# Patient Record
Sex: FEMALE | Race: WHITE | NOT HISPANIC OR LATINO | Employment: FULL TIME | URBAN - METROPOLITAN AREA
[De-identification: names, ages, dates, MRNs, and addresses within clinical notes are randomized per-mention and may not be internally consistent; named-entity substitution may affect disease eponyms.]

---

## 2017-01-31 ENCOUNTER — OFFICE VISIT (OUTPATIENT)
Dept: VASCULAR LAB | Facility: MEDICAL CENTER | Age: 27
End: 2017-01-31
Attending: NURSE PRACTITIONER
Payer: MEDICAID

## 2017-01-31 VITALS
HEART RATE: 85 BPM | HEIGHT: 67 IN | DIASTOLIC BLOOD PRESSURE: 90 MMHG | SYSTOLIC BLOOD PRESSURE: 128 MMHG | WEIGHT: 115 LBS | BODY MASS INDEX: 18.05 KG/M2

## 2017-01-31 DIAGNOSIS — I82.4Y9 ACUTE VENOUS EMBOLISM AND THROMBOSIS OF DEEP VESSELS OF PROXIMAL LOWER EXTREMITY, UNSPECIFIED LATERALITY (HCC): ICD-10-CM

## 2017-01-31 PROCEDURE — 99214 OFFICE O/P EST MOD 30 MIN: CPT | Performed by: NURSE PRACTITIONER

## 2017-01-31 PROCEDURE — 99213 OFFICE O/P EST LOW 20 MIN: CPT | Performed by: NURSE PRACTITIONER

## 2017-01-31 ASSESSMENT — ENCOUNTER SYMPTOMS
BLOOD IN STOOL: 0
COUGH: 0
SHORTNESS OF BREATH: 0
BRUISES/BLEEDS EASILY: 0
TINGLING: 0
HEMOPTYSIS: 0
DIZZINESS: 0
HEADACHES: 0
LOSS OF CONSCIOUSNESS: 0
NERVOUS/ANXIOUS: 0
PALPITATIONS: 0
WHEEZING: 0

## 2017-01-31 NOTE — MR AVS SNAPSHOT
"        Nichol Chaney   2017 4:00 PM   Office Visit   MRN: [id number]    Department:  Vascular Medicine   Dept Phone:  [phone]    Description:  Female : 1990   Provider:  OMAR Galvan           Reason for Visit     New Patient LOT      Allergies as of 2017     No Known Allergies      You were diagnosed with     Acute venous embolism and thrombosis of deep vessels of proximal lower extremity, unspecified laterality (CMS-HCC)   [[id number]]         Vital Signs     Blood Pressure Pulse Height Weight Body Mass Index Smoking Status    128/90 mmHg 85 1.702 m (5' 7.01\") 52.164 kg (115 lb) 18.01 kg/m2 Current Some Day Smoker      Basic Information     Date Of Birth Sex Race Ethnicity Preferred Language    1990 Female White Non- English      Problem List              ICD-10-CM Priority Class Noted - Resolved    Acute venous embolism and thrombosis of deep vessels of proximal lower extremity (HCC) [I82.4Y9] I82.4Y9   10/18/2016 - Present      Health Maintenance        Date Due Completion Dates    IMM HEP B VACCINE (1 of 3 - Primary Series) 1991 ---    IMM HEP A VACCINE (1 of 2 - Standard Series) 1992 ---    IMM HPV VACCINE (1 of 3 - Female 3 Dose Series) 2002 ---    IMM VARICELLA (CHICKENPOX) VACCINE (1 of 2 - 2 Dose Adolescent Series) 2004 ---    PAP SMEAR 2012 ---    IMM INFLUENZA (1) 2016 ---    IMM DTaP/Tdap/Td Vaccine (2 - Td) 2023, 2011            Current Immunizations     Tdap Vaccine 2013  5:30 PM, 2011  5:15 PM      Below and/or attached are the medications your provider expects you to take. Review all of your home medications and newly ordered medications with your provider and/or pharmacist. Follow medication instructions as directed by your provider and/or pharmacist. Please keep your medication list with you and share with your provider. Update the information when medications are discontinued, doses are " changed, or new medications (including over-the-counter products) are added; and carry medication information at all times in the event of emergency situations     Allergies:  No Known Allergies          Medications  Valid as of: January 31, 2017 -  5:32 PM    Generic Name Brand Name Tablet Size Instructions for use    Apixaban (Tab) ELIQUIS 5mg Take 5 mg by mouth 2 Times a Day.        .                 Medicines prescribed today were sent to:     [facility] [address] [place] [address], [place] - [address] [place] [address]    Phone: [phone] Fax: [phone]    Open 24 Hours?: No      Medication refill instructions:       If your prescription bottle indicates you have medication refills left, it is not necessary to call your provider’s office. Please contact your pharmacy and they will refill your medication.    If your prescription bottle indicates you do not have any refills left, you may request refills at any time through one of the following ways: The online [facility]undi system (except Urgent Care), by calling your provider’s office, or by asking your pharmacy to contact your provider’s office with a refill request. Medication refills are processed only during regular business hours and may not be available until the next business day. Your provider may request additional information or to have a follow-up visit with you prior to refilling your medication.   *Please Note: Medication refills are assigned a new Rx number when refilled electronically. Your pharmacy may indicate that no refills were authorized even though a new prescription for the same medication is available at the pharmacy. Please request the medicine by name with the pharmacy before contacting your provider for a refill.        Your To Do List     Future Labs/Procedures Complete By Expires    BETA-2-MICROGLOBULIN  As directed 1/31/2018    D-DIMER  As directed 1/31/2018         Mundi Status: Patient Declined

## 2017-02-01 NOTE — PROGRESS NOTES
"INITIAL VASCULAR ANTI-COAGULATION VISIT  Subjective:   Nichol Chaney is a 26 y.o. female who presents today 1/31/2017 for   Chief Complaint   Patient presents with   • New Patient     LOT       HPI: Pt presents today for evaluation of her chronic anticoagulation and determination of end of therapy.    Had 1st time, seemingly unprovoked DVT in October of 2016. Denies any previous VTE.  No known family hx of VTE.  Denies any recent surgery or trauma, prior to the event.  Pt is an active smoker, smoking ~2-5 cigarettes/day.  Hx of OCT 5 years ago; Depo Provera  Denies any previous extended travel.  She states she is up to date on all age-appropriate cancer screenings  Had some hypercoag w/u during initial presentation of DVT; protein C low, but all other tests normal (factor V leiden, lupus anticoag, & protein S)  She is wearing compression stockings daily    Social History   Substance Use Topics   • Smoking status: Current Some Day Smoker -- 0.00 packs/day for 1 years     Start date: 10/01/2016   • Smokeless tobacco: Never Used      Comment: 1/2 ppd   • Alcohol Use: No     DIET AND EXERCISE:  Weight Change: stable  Diet: common adult  Exercise: walks daily; works as      Review of Systems   Constitutional: Negative for malaise/fatigue.   HENT: Negative for nosebleeds.    Respiratory: Negative for cough, hemoptysis, shortness of breath and wheezing.    Cardiovascular: Negative for chest pain, palpitations and leg swelling.   Gastrointestinal: Negative for blood in stool.   Genitourinary: Negative for hematuria.   Skin: Negative for itching and rash.   Neurological: Negative for dizziness, tingling, loss of consciousness and headaches.   Endo/Heme/Allergies: Does not bruise/bleed easily.   Psychiatric/Behavioral: The patient is not nervous/anxious.       Objective:     Filed Vitals:    01/31/17 1550   BP: 128/90   Pulse: 85   Height: 1.702 m (5' 7.01\")   Weight: 52.164 kg (115 lb)     Body mass index " is 18.01 kg/(m^2).  Physical Exam   Constitutional: She is oriented to person, place, and time. She appears well-developed and well-nourished. No distress.   HENT:   Head: Normocephalic.   Eyes: Pupils are equal, round, and reactive to light.   Neck: Normal range of motion.   Cardiovascular: Normal rate, regular rhythm, normal heart sounds and intact distal pulses.    No murmur heard.  Pulmonary/Chest: Effort normal and breath sounds normal. No respiratory distress. She has no wheezes.   Musculoskeletal: Normal range of motion. She exhibits no edema.   Neurological: She is alert and oriented to person, place, and time. Coordination normal.   Skin: Skin is warm and dry. She is not diaphoretic. No erythema.   Psychiatric: She has a normal mood and affect. Her behavior is normal. Thought content normal.         Lab Results   Component Value Date    PROTHROMBTM 14.7* 10/13/2016    INR 1.5 10/21/2016         Lab Results   Component Value Date    SODIUM 136 10/13/2016    POTASSIUM 4.2 10/13/2016    CHLORIDE 103 10/13/2016    CO2 21 10/13/2016    GLUCOSE 77 10/13/2016    BUN 7* 10/13/2016    CREATININE 0.76 10/13/2016        Lab Results   Component Value Date    WBC 6.8 10/13/2016    RBC 5.39 10/13/2016    HEMOGLOBIN 18.0* 10/13/2016    HEMATOCRIT 52.1* 10/13/2016    MCV 96.7 10/13/2016    MCH 33.4* 10/13/2016    MCHC 34.5 10/13/2016    MPV 10.5 10/13/2016      Medical Decision Making:  Today's Assessment / Status / Plan:     1. Acute venous embolism and thrombosis of deep vessels of proximal lower extremity, unspecified laterality (CMS-HCC)  D-DIMER    BETA-2-MICROGLOBULIN    ANTICARDIOLIPIN AB IGG IGM    PROTEIN C FUNCTIONAL    PROTEIN S FUNCTIONAL    ANTITHROMBIN III FUNC/IMMUNOL    [facility] PROTHROMBIN GENE ANALYSIS     Indication for anticoagulation:  LLE DVT, femoral/profunda vein 10/13/16    Anti-Platelet/Anti-Coagulant Tx: yes, Eliquis 5mg BID    Anti-Coagulation Plan:  Pt has completed 3 full mo of anticoagulation  "with Eliquis.  Was initially on Xarelto, but had some unusual bruising.  Switched to Eliquis in the beginning of her treatment and tolerated without complaint or concern for excessive bleeding/bruising.  Discussed the potential for increased VTE recurrence with continued smoking.  Pt understands this potential, and plans to \"cut down,\" on her smoking.  Discussed e-cig/nicotine replacement, however, she is not interested in smoking cessation at this time.  Given this risk, she still wishes to stop anticoagulation.  D/t the unprovoked nature of her VTE and previous low protein C value, will do hypercoag panel.  Discussed stopping Eliquis and starting a daily, lifelong, aspirin regimen.  Reviewed s/s of return VTE and when to seek immediate care.  Pt understands that if she plans on having any invasive procedures, she needs to let the MD know that she is taking ASA daily.  Pt understands to avoid HRT/OCT lifelong, and to notify her OB of her DVT hx, with any future pregnancies.  Pt states she is planning on having more children in her future.  -Take last dose of Eliquis tonight, then STOP  -Tomorrow, start ASA 81mg daily  -In a full 2weeks, have hypercoag panel drawn  -Continue to monitor for s/s of return VTE and seek immediate care for chest pain, SOB, slurring of words or confusion  -Continue to wear compression stockings for at least 1 year    Smoking: strongly recommend smoking cessation.  Pt understands the increased risk of return VTE with continued smoking.       Physical Activity: frequency : goal is  3-4 times a week    Instructed to follow-up with PCP for remainder of adult medical needs: yes- encouraged to establish with a PCP  We will partner with other provider in the management of established vascular disease and cardiometabolic risk factors    Studies to Be Obtained:   Labs to Be Obtained: d dimer, anticardiolipin antibodies, beta 2 microglob, protein C/S, antithrombin III, prothrombin gene " analysis    Follow up in: 3 weeks via telephone to review hypercoag panel    APRYL Galvan.

## 2017-02-07 ENCOUNTER — HOSPITAL ENCOUNTER (EMERGENCY)
Facility: MEDICAL CENTER | Age: 27
End: 2017-02-07
Attending: EMERGENCY MEDICINE
Payer: MEDICAID

## 2017-02-07 VITALS
BODY MASS INDEX: 17.89 KG/M2 | RESPIRATION RATE: 16 BRPM | DIASTOLIC BLOOD PRESSURE: 85 MMHG | SYSTOLIC BLOOD PRESSURE: 125 MMHG | OXYGEN SATURATION: 100 % | HEART RATE: 84 BPM | TEMPERATURE: 98.3 F | HEIGHT: 67 IN | WEIGHT: 113.98 LBS

## 2017-02-07 DIAGNOSIS — S41.112A LACERATION OF ARM, LEFT, INITIAL ENCOUNTER: ICD-10-CM

## 2017-02-07 PROCEDURE — 99283 EMERGENCY DEPT VISIT LOW MDM: CPT

## 2017-02-07 PROCEDURE — 304217 HCHG IRRIGATION SYSTEM

## 2017-02-07 PROCEDURE — A6403 STERILE GAUZE>16 <= 48 SQ IN: HCPCS

## 2017-02-07 PROCEDURE — 302874 HCHG BANDAGE ACE 2 OR 3""

## 2017-02-07 RX ORDER — ASPIRIN 81 MG/1
81 TABLET, CHEWABLE ORAL DAILY
Status: SHIPPED | COMMUNITY
End: 2019-01-02

## 2017-02-07 ASSESSMENT — PAIN SCALES - GENERAL: PAINLEVEL_OUTOF10: 9

## 2017-02-07 NOTE — DISCHARGE INSTRUCTIONS
Laceration Care, Adult  A laceration is a cut that goes through all layers of the skin. The cut also goes into the tissue that is right under the skin. Some cuts heal on their own. Others need to be closed with stitches (sutures), staples, skin adhesive strips, or wound glue. Taking care of your cut lowers your risk of infection and helps your cut to heal better.  HOW TO TAKE CARE OF YOUR CUT  For stitches or staples:  · Keep the wound clean and dry.  · If you were given a bandage (dressing), you should change it at least one time per day or as told by your doctor. You should also change it if it gets wet or dirty.  · Keep the wound completely dry for the first 24 hours or as told by your doctor. After that time, you may take a shower or a bath. However, make sure that the wound is not soaked in water until after the stitches or staples have been removed.  · Clean the wound one time each day or as told by your doctor:  ¨ Wash the wound with soap and water.  ¨ Rinse the wound with water until all of the soap comes off.  ¨ Pat the wound dry with a clean towel. Do not rub the wound.  · After you clean the wound, put a thin layer of antibiotic ointment on it as told by your doctor. This ointment:  ¨ Helps to prevent infection.  ¨ Keeps the bandage from sticking to the wound.  · Have your stitches or staples removed as told by your doctor.  If your doctor used skin adhesive strips:   · Keep the wound clean and dry.  · If you were given a bandage, you should change it at least one time per day or as told by your doctor. You should also change it if it gets dirty or wet.  · Do not get the skin adhesive strips wet. You can take a shower or a bath, but be careful to keep the wound dry.  · If the wound gets wet, pat it dry with a clean towel. Do not rub the wound.  · Skin adhesive strips fall off on their own. You can trim the strips as the wound heals. Do not remove any strips that are still stuck to the wound. They will  fall off after a while.  If your doctor used wound glue:  · Try to keep your wound dry, but you may briefly wet it in the shower or bath. Do not soak the wound in water, such as by swimming.  · After you take a shower or a bath, gently pat the wound dry with a clean towel. Do not rub the wound.  · Do not do any activities that will make you really sweaty until the skin glue has fallen off on its own.  · Do not apply liquid, cream, or ointment medicine to your wound while the skin glue is still on.  · If you were given a bandage, you should change it at least one time per day or as told by your doctor. You should also change it if it gets dirty or wet.  · If a bandage is placed over the wound, do not let the tape for the bandage touch the skin glue.  · Do not pick at the glue. The skin glue usually stays on for 5-10 days. Then, it falls off of the skin.  General Instructions   · To help prevent scarring, make sure to cover your wound with sunscreen whenever you are outside after stitches are removed, after adhesive strips are removed, or when wound glue stays in place and the wound is healed. Make sure to wear a sunscreen of at least 30 SPF.  · Take over-the-counter and prescription medicines only as told by your doctor.  · If you were given antibiotic medicine or ointment, take or apply it as told by your doctor. Do not stop using the antibiotic even if your wound is getting better.  · Do not scratch or pick at the wound.  · Keep all follow-up visits as told by your doctor. This is important.  · Check your wound every day for signs of infection. Watch for:  ¨ Redness, swelling, or pain.  ¨ Fluid, blood, or pus.  · Raise (elevate) the injured area above the level of your heart while you are sitting or lying down, if possible.  GET HELP IF:  · You got a tetanus shot and you have any of these problems at the injection site:  ¨ Swelling.  ¨ Very bad pain.  ¨ Redness.  ¨ Bleeding.  · You have a fever.  · A wound that was  closed breaks open.  · You notice a bad smell coming from your wound or your bandage.  · You notice something coming out of the wound, such as wood or glass.  · Medicine does not help your pain.  · You have more redness, swelling, or pain at the site of your wound.  · You have fluid, blood, or pus coming from your wound.  · You notice a change in the color of your skin near your wound.  · You need to change the bandage often because fluid, blood, or pus is coming from the wound.  · You start to have a new rash.  · You start to have numbness around the wound.  GET HELP RIGHT AWAY IF:  · You have very bad swelling around the wound.  · Your pain suddenly gets worse and is very bad.  · You notice painful lumps near the wound or on skin that is anywhere on your body.  · You have a red streak going away from your wound.  · The wound is on your hand or foot and you cannot move a finger or toe like you usually can.  · The wound is on your hand or foot and you notice that your fingers or toes look pale or bluish.     This information is not intended to replace advice given to you by your health care provider. Make sure you discuss any questions you have with your health care provider.     Document Released: 06/05/2009 Document Revised: 05/03/2016 Document Reviewed: 12/14/2015  [employer] Interactive Patient Education ©2016 [employer]

## 2017-02-07 NOTE — ED AVS SNAPSHOT
Adenios Access Code: Activation code not generated  Current Adenios Status: Patient Declined    Your email address is not on file at [facility].  Email Addresses are required for you to sign up for Adenios, please contact [phone] to verify your personal information and to provide your email address prior to attempting to register for Adenios.    Nichol Chaney  [address]  [place] [address]    [employer]  A secure, online tool to manage your health information     [facility]’s Adenios® is a secure, online tool that connects you to your personalized health information from the privacy of your home -- day or night - making it very easy for you to manage your healthcare. Once the activation process is completed, you can even access your medical information using the Adenios livier, which is available for free in the Apple Livier store or Google Play store.     To learn more about Adenios, visit [email or web address]    There are two levels of access available (as shown below):   My Chart Features  [facility] Primary Care Doctor [facility]  Specialists [facility]  Urgent  Care Non-[facility] Primary Care Doctor   Email your healthcare team securely and privately 24/7 X X X    Manage appointments: schedule your next appointment; view details of past/upcoming appointments X      Request prescription refills. X      View recent personal medical records, including lab and immunizations X X X X   View health record, including health history, allergies, medications X X X X   Read reports about your outpatient visits, procedures, consult and ER notes X X X X   See your discharge summary, which is a recap of your hospital and/or ER visit that includes your diagnosis, lab results, and care plan X X  X     How to register for [employer]:  Once your e-mail address has been verified, follow the following steps to sign up for [employer].     1. Go to  [email or web address]  2. Click on the Sign Up Now box, which takes you to the New  Member Sign Up page. You will need to provide the following information:  a. Enter your [facility] Access Code exactly as it appears at the top of this page. (You will not need to use this code after you’ve completed the sign-up process. If you do not sign up before the expiration date, you must request a new code.)   b. Enter your date of birth.   c. Enter your home email address.   d. Click Submit, and follow the next screen’s instructions.  3. Create a [employer]t ID. This will be your [facility]ota login ID and cannot be changed, so think of one that is secure and easy to remember.  4. Create a [facility] password. You can change your password at any time.  5. Enter your Password Reset Question and Answer. This can be used at a later time if you forget your password.   6. Enter your e-mail address. This allows you to receive e-mail notifications when new information is available in Ankota.  7. Click Sign Up. You can now view your health information.    For assistance activating your [facility] account, call [phone]

## 2017-02-07 NOTE — ED AVS SNAPSHOT
Home Care Instructions                                                                                                                Nichol Chaney   MRN: [id number]    Department:  [facility], Emergency Dept   Date of Visit:  2/7/2017            [facility], Emergency Dept    [address] [place] [address]    Phone:  [phone]      You were seen by     Carlos Gaston M.D.      Your Diagnosis Was     Laceration of arm, left, initial encounter     S41.112A       Follow-up Information     1. Schedule an appointment as soon as possible for a visit with Pcp Pt States None.    Specialty:  Family Medicine    Why:  As needed, Return if any symptoms worsen      Medication Information     Review all of your home medications and newly ordered medications with your primary doctor and/or pharmacist as soon as possible. Follow medication instructions as directed by your doctor and/or pharmacist.     Please keep your complete medication list with you and share with your physician. Update the information when medications are discontinued, doses are changed, or new medications (including over-the-counter products) are added; and carry medication information at all times in the event of emergency situations.               Medication List      ASK your doctor about these medications        Instructions    aspirin 81 MG Chew chewable tablet   Commonly known as:  ASA    Take 81 mg by mouth every day.   Dose:  81 mg       ELIQUIS 5mg Tabs   Generic drug:  apixaban    Take 5 mg by mouth 2 Times a Day.   Dose:  5 mg               Procedures and tests performed during your visit     NURSING COMMUNICATION        Discharge Instructions       Laceration Care, Adult  A laceration is a cut that goes through all layers of the skin. The cut also goes into the tissue that is right under the skin. Some cuts heal on their own. Others need to be closed with stitches (sutures),  staples, skin adhesive strips, or wound glue. Taking care of your cut lowers your risk of infection and helps your cut to heal better.  HOW TO TAKE CARE OF YOUR CUT  For stitches or staples:  · Keep the wound clean and dry.  · If you were given a bandage (dressing), you should change it at least one time per day or as told by your doctor. You should also change it if it gets wet or dirty.  · Keep the wound completely dry for the first 24 hours or as told by your doctor. After that time, you may take a shower or a bath. However, make sure that the wound is not soaked in water until after the stitches or staples have been removed.  · Clean the wound one time each day or as told by your doctor:  ¨ Wash the wound with soap and water.  ¨ Rinse the wound with water until all of the soap comes off.  ¨ Pat the wound dry with a clean towel. Do not rub the wound.  · After you clean the wound, put a thin layer of antibiotic ointment on it as told by your doctor. This ointment:  ¨ Helps to prevent infection.  ¨ Keeps the bandage from sticking to the wound.  · Have your stitches or staples removed as told by your doctor.  If your doctor used skin adhesive strips:   · Keep the wound clean and dry.  · If you were given a bandage, you should change it at least one time per day or as told by your doctor. You should also change it if it gets dirty or wet.  · Do not get the skin adhesive strips wet. You can take a shower or a bath, but be careful to keep the wound dry.  · If the wound gets wet, pat it dry with a clean towel. Do not rub the wound.  · Skin adhesive strips fall off on their own. You can trim the strips as the wound heals. Do not remove any strips that are still stuck to the wound. They will fall off after a while.  If your doctor used wound glue:  · Try to keep your wound dry, but you may briefly wet it in the shower or bath. Do not soak the wound in water, such as by swimming.  · After you take a shower or a bath,  gently pat the wound dry with a clean towel. Do not rub the wound.  · Do not do any activities that will make you really sweaty until the skin glue has fallen off on its own.  · Do not apply liquid, cream, or ointment medicine to your wound while the skin glue is still on.  · If you were given a bandage, you should change it at least one time per day or as told by your doctor. You should also change it if it gets dirty or wet.  · If a bandage is placed over the wound, do not let the tape for the bandage touch the skin glue.  · Do not pick at the glue. The skin glue usually stays on for 5-10 days. Then, it falls off of the skin.  General Instructions   · To help prevent scarring, make sure to cover your wound with sunscreen whenever you are outside after stitches are removed, after adhesive strips are removed, or when wound glue stays in place and the wound is healed. Make sure to wear a sunscreen of at least 30 SPF.  · Take over-the-counter and prescription medicines only as told by your doctor.  · If you were given antibiotic medicine or ointment, take or apply it as told by your doctor. Do not stop using the antibiotic even if your wound is getting better.  · Do not scratch or pick at the wound.  · Keep all follow-up visits as told by your doctor. This is important.  · Check your wound every day for signs of infection. Watch for:  ¨ Redness, swelling, or pain.  ¨ Fluid, blood, or pus.  · Raise (elevate) the injured area above the level of your heart while you are sitting or lying down, if possible.  GET HELP IF:  · You got a tetanus shot and you have any of these problems at the injection site:  ¨ Swelling.  ¨ Very bad pain.  ¨ Redness.  ¨ Bleeding.  · You have a fever.  · A wound that was closed breaks open.  · You notice a bad smell coming from your wound or your bandage.  · You notice something coming out of the wound, such as wood or glass.  · Medicine does not help your pain.  · You have more redness,  swelling, or pain at the site of your wound.  · You have fluid, blood, or pus coming from your wound.  · You notice a change in the color of your skin near your wound.  · You need to change the bandage often because fluid, blood, or pus is coming from the wound.  · You start to have a new rash.  · You start to have numbness around the wound.  GET HELP RIGHT AWAY IF:  · You have very bad swelling around the wound.  · Your pain suddenly gets worse and is very bad.  · You notice painful lumps near the wound or on skin that is anywhere on your body.  · You have a red streak going away from your wound.  · The wound is on your hand or foot and you cannot move a finger or toe like you usually can.  · The wound is on your hand or foot and you notice that your fingers or toes look pale or bluish.     This information is not intended to replace advice given to you by your health care provider. Make sure you discuss any questions you have with your health care provider.     Document Released: 06/05/2009 Document Revised: 05/03/2016 Document Reviewed: 12/14/2015  [employer] Interactive Patient Education ©2016 [employer]            Patient Information     Patient Information    Following emergency treatment: all patient requiring follow-up care must return either to a private physician or a clinic if your condition worsens before you are able to obtain further medical attention, please return to the emergency room.     Billing Information    At [facility], we work to make the billing process streamlined for our patients.  Our Representatives are here to answer any questions you may have regarding your hospital bill.  If you have insurance coverage and have supplied your insurance information to us, we will submit a claim to your insurer on your behalf.  Should you have any questions regarding your bill, we can be reached online or by phone as follows:  Online: You are able pay your bills online or live chat with our  representatives about any billing questions you may have. We are here to help Monday - Friday from 8:00am to 7:30pm and 9:00am - 12:00pm on Saturdays.  Please visit [email or web address]  for more information.   Phone:  [phone] or [phone]    Please note that your emergency physician, surgeon, pathologist, radiologist, anesthesiologist, and other specialists are not employed by [facility] and will therefore bill separately for their services.  Please contact them directly for any questions concerning their bills at the numbers below:     Emergency Physician Services:  [phone]  [facility]:  [phone]  [facility]:  [phone]  [facility]:  [phone]    1. Your final bill may vary from the amount quoted upon discharge if all procedures are not complete at that time, or if your doctor has additional procedures of which we are not aware. You will receive an additional bill if you return to the Emergency Department at [facility] for suture removal regardless of the facility of which the sutures were placed.     2. Please arrange for settlement of this account at the emergency registration.    3. All self-pay accounts are due in full at the time of treatment.  If you are unable to meet this obligation then payment is expected within 4-5 days.     4. If you have had radiology studies (CT, X-ray, Ultrasound, MRI), you have received a preliminary result during your emergency department visit. Please contact the radiology department [phone] to receive a copy of your final result. Please discuss the Final result with your primary physician or with the follow up physician provided.     Crisis Hotline:  [place] Crisis Hotline:  4-331-WSEFQJM or 1-873.744.1149  [place] Crisis Hotline:    1-445.696.3504 or 022-804-6354         ED Discharge Follow Up Questions    1. In order to provide you with very good care, we would  like to follow up with a phone call in the next few days.  May we have your permission to contact you?     YES /  NO    2. What is the best phone number to call you? (       )_____-__________    3. What is the best time to call you?      Morning  /  Afternoon  /  Evening                   Patient Signature:  ____________________________________________________________    Date:  ____________________________________________________________

## 2017-02-07 NOTE — ED PROVIDER NOTES
ED Provider Note    CHIEF COMPLAINT  Chief Complaint   Patient presents with   • T-5000 Lacerations     LAC Last night from broken plate        HPI  Nichol Chaney is a 26 y.o. female who presents to ED with complaint of laceration to the left arm. Patient's dates that she was cleaning dishes reaching into her  surgically cut in the antecubital fossa area of her left arm. This occurred about 9 o'clock last night. Patient thought that it would close up on its own, but because it was still rather wide. This morning she decided to come to the ED for evaluation. Now been greater than 12 hours of this wound being opened. She currently is on a liquid because of a DVT. Patient denies any other symptoms.        REVIEW OF SYSTEMS  See HPI for further details. All other systems are negative.     PAST MEDICAL HISTORY  Past Medical History   Diagnosis Date   • DVT (deep venous thrombosis) (CMS-[facility])        FAMILY HISTORY  History reviewed. No pertinent family history.  Patient's family history has been discussed and is been found to be noncontributory to his present illness  SOCIAL HISTORY  Social History     Social History   • Marital Status: Single     Spouse Name: N/A   • Number of Children: N/A   • Years of Education: N/A     Social History Main Topics   • Smoking status: Current Every Day Smoker -- 0.25 packs/day for 1 years     Start date: 10/01/2016   • Smokeless tobacco: Never Used      Comment: 1/2 ppd   • Alcohol Use: Yes      Comment: Occas   • Drug Use: No   • Sexual Activity: Not Asked     Other Topics Concern   • None     Social History Narrative            SURGICAL HISTORY  History reviewed. No pertinent past surgical history.    CURRENT MEDICATIONS   Home Medications     Reviewed by Itzel Pena R.N. (Registered Nurse) on 02/07/17 at 0925  Med List Status: Complete    Medication Last Dose Status    apixaban (ELIQUIS) 5mg Tab 1/31/2017 Active    aspirin (ASA) 81 MG Chew Tab chewable tablet 2/6/2017  "Active                ALLERGIES   No Known Allergies    PHYSICAL EXAM  VITAL SIGNS: /90 mmHg  Pulse 99  Temp(Src) 36.8 °C (98.3 °F)  Resp 14  Ht 1.702 m (5' 7\")  Wt 51.7 kg (113 lb 15.7 oz)  BMI 17.85 kg/m2  SpO2 99%   Pulse Ox interpretation. Nonhypoxic    Constitutional: Well developed, Well nourished, No acute distress, Non-toxic appearance.     Skin: Warm, Dry, No erythema,   Musculoskeletal: Patient has a 4.5 cm laceration located in the antecubital fossa that just extends down to the subcutaneous tissue, but no further. It is open. There is no vascular involvement. There is no tendinous involvement.  Neurologic: Alert & oriented x 3, Normal motor function, Normal sensory function, No focal deficits noted.     RADIOLOGY/PROCEDURES  No orders to display         COURSE & MEDICAL DECISION MAKING  Pertinent Labs & Imaging studies reviewed. (See chart for details)  The wound is been open greater than 12 hours. At this point, I feel by suture. A para would actually cause infection or the infection risk is significantly worse. At this point, I do feel that secondary intention. It is worthwhile for healing. I have given the patient would care instructions. Recommend return as needed. The wound was cleaned out and bacitracin was placed on the wound.    FINAL IMPRESSION  1. Laceration of arm, left, initial encounter           The patient will return for new or worsening symptoms and is stable at the time of discharge.    The patient is referred to a primary physician for blood pressure management, diabetic screening, and for all other preventative health concerns.    DISPOSITION:  Patient will be discharged home in stable condition.    FOLLOW UP:  Pcp Pt States None    Schedule an appointment as soon as possible for a visit  As needed, Return if any symptoms worsen      OUTPATIENT MEDICATIONS:  New Prescriptions    No medications on file                 Electronically signed by: Carlos Gaston, 2/7/2017 " 9:42 AM

## 2017-02-16 ENCOUNTER — ANTICOAGULATION MONITORING (OUTPATIENT)
Dept: VASCULAR LAB | Facility: MEDICAL CENTER | Age: 27
End: 2017-02-16

## 2017-02-16 DIAGNOSIS — I82.4Y9 ACUTE VENOUS EMBOLISM AND THROMBOSIS OF DEEP VESSELS OF PROXIMAL LOWER EXTREMITY, UNSPECIFIED LATERALITY (HCC): ICD-10-CM

## 2017-02-16 NOTE — PROGRESS NOTES
Discharged from [facility].  Therapy completed 1/31/2017 by Dr. Adriana Smith, PHARMD

## 2017-02-27 ENCOUNTER — TELEPHONE (OUTPATIENT)
Dept: VASCULAR LAB | Facility: MEDICAL CENTER | Age: 27
End: 2017-02-27

## 2017-02-28 ENCOUNTER — TELEPHONE (OUTPATIENT)
Dept: VASCULAR LAB | Facility: MEDICAL CENTER | Age: 27
End: 2017-02-28

## 2017-03-01 NOTE — TELEPHONE ENCOUNTER
"Have attempted to call patient multiple times, unable to reach her. Called emergency contact  (mom) patient got a new phone number. Called patient on new phone number to remind her to get hypercoag panel asap. She states that her car has recently \"\" and she will have this done as soon as possible.   "

## 2017-03-20 ENCOUNTER — TELEPHONE (OUTPATIENT)
Dept: VASCULAR LAB | Facility: MEDICAL CENTER | Age: 27
End: 2017-03-20

## 2017-03-20 NOTE — TELEPHONE ENCOUNTER
LM on pt's new voicemail, reminding her to complete her hypercoag blood work to test for genetic predispositions that could increase her risk for blood clots.  Urged the importance of these tests.  APN vasc line given.    ADAM Butler  [facility]

## 2017-03-28 ENCOUNTER — TELEPHONE (OUTPATIENT)
Dept: VASCULAR LAB | Facility: MEDICAL CENTER | Age: 27
End: 2017-03-28

## 2017-04-17 ENCOUNTER — TELEPHONE (OUTPATIENT)
Dept: VASCULAR LAB | Facility: MEDICAL CENTER | Age: 27
End: 2017-04-17

## 2017-04-17 NOTE — TELEPHONE ENCOUNTER
Spoke with pt this morning.  She states she will go to [facility] lab in [place] next week.  She does understand the importance of this blood work up.    ADAM Butler  [facility]

## 2017-04-25 DIAGNOSIS — I82.4Y9 ACUTE VENOUS EMBOLISM AND THROMBOSIS OF DEEP VESSELS OF PROXIMAL LOWER EXTREMITY, UNSPECIFIED LATERALITY (HCC): ICD-10-CM

## 2017-05-02 ENCOUNTER — TELEPHONE (OUTPATIENT)
Dept: VASCULAR LAB | Facility: MEDICAL CENTER | Age: 27
End: 2017-05-02

## 2017-05-23 ENCOUNTER — TELEPHONE (OUTPATIENT)
Dept: VASCULAR LAB | Facility: MEDICAL CENTER | Age: 27
End: 2017-05-23

## 2017-05-23 NOTE — TELEPHONE ENCOUNTER
"Received incomplete labwork from [facility] stating \"unable to run test,\" for pt's hypercoag blood work.  Spoke with [facility] representative who states the pt's blood samples were, \"mislabeled,\" with the wrong name and therefore could not be run.  Pt notified.  She is uncertain if/when she will repeat this testing, as it is very difficult for her to get to the lab d/t transportation and  issues.  Tried to get auth for [facility] labs, however, as pt has HPN, she is only contracted with [facility]  Encouraged pt repeat testing, as the results may determine the reasoning behind her VTE and risk for recurrence.      Maricarmen Wright, ADAM  [facility]        "

## 2017-06-05 ENCOUNTER — TELEPHONE (OUTPATIENT)
Dept: VASCULAR LAB | Facility: MEDICAL CENTER | Age: 27
End: 2017-06-05

## 2017-06-05 NOTE — TELEPHONE ENCOUNTER
Spoke with patient about getting hypercoag panel redrawn, patient will go to [facility] on [address] tomorrow 6/6/17 and have this done. Will refax orders over.

## 2017-06-23 ENCOUNTER — TELEPHONE (OUTPATIENT)
Dept: VASCULAR LAB | Facility: MEDICAL CENTER | Age: 27
End: 2017-06-23

## 2017-06-23 NOTE — TELEPHONE ENCOUNTER
"Spoke with patient and relayed below message. Patient verbalizes understanding.     \"Tell her all of her testing came back normal, which is really great news.  She needs to stay on ASA lifelong, no hormone replacement therapy with estrogen lifelong, and to always watch for signs of return blood clot (warmth, redness, pain, swelling to an arm or leg, or SOB) and to go to ER if this happens.   Also, if she becomes pregnant again, she will need to be on medication to prevent blood clots.   Follow up from now on with PCP\"  "

## 2017-10-12 ENCOUNTER — HOSPITAL ENCOUNTER (EMERGENCY)
Facility: MEDICAL CENTER | Age: 27
End: 2017-10-12
Attending: EMERGENCY MEDICINE
Payer: MEDICAID

## 2017-10-12 ENCOUNTER — APPOINTMENT (OUTPATIENT)
Dept: RADIOLOGY | Facility: MEDICAL CENTER | Age: 27
End: 2017-10-12
Attending: EMERGENCY MEDICINE
Payer: MEDICAID

## 2017-10-12 VITALS
BODY MASS INDEX: 17.74 KG/M2 | OXYGEN SATURATION: 98 % | HEART RATE: 84 BPM | RESPIRATION RATE: 16 BRPM | HEIGHT: 68 IN | DIASTOLIC BLOOD PRESSURE: 84 MMHG | SYSTOLIC BLOOD PRESSURE: 118 MMHG | WEIGHT: 117.06 LBS | TEMPERATURE: 98.9 F

## 2017-10-12 DIAGNOSIS — F41.9 ANXIETY: ICD-10-CM

## 2017-10-12 DIAGNOSIS — R07.9 CHEST PAIN, UNSPECIFIED TYPE: ICD-10-CM

## 2017-10-12 LAB
ALBUMIN SERPL BCP-MCNC: 4.3 G/DL (ref 3.2–4.9)
ALBUMIN/GLOB SERPL: 1.4 G/DL
ALP SERPL-CCNC: 71 U/L (ref 30–99)
ALT SERPL-CCNC: 19 U/L (ref 2–50)
ANION GAP SERPL CALC-SCNC: 10 MMOL/L (ref 0–11.9)
APPEARANCE UR: CLEAR
AST SERPL-CCNC: 36 U/L (ref 12–45)
BASOPHILS # BLD AUTO: 0.5 % (ref 0–1.8)
BASOPHILS # BLD: 0.04 K/UL (ref 0–0.12)
BILIRUB SERPL-MCNC: 0.9 MG/DL (ref 0.1–1.5)
BUN SERPL-MCNC: 9 MG/DL (ref 8–22)
CALCIUM SERPL-MCNC: 9.3 MG/DL (ref 8.4–10.2)
CHLORIDE SERPL-SCNC: 107 MMOL/L (ref 96–112)
CO2 SERPL-SCNC: 20 MMOL/L (ref 20–33)
COLOR UR: YELLOW
CREAT SERPL-MCNC: 0.6 MG/DL (ref 0.5–1.4)
EKG IMPRESSION: NORMAL
EOSINOPHIL # BLD AUTO: 0.09 K/UL (ref 0–0.51)
EOSINOPHIL NFR BLD: 1.2 % (ref 0–6.9)
ERYTHROCYTE [DISTWIDTH] IN BLOOD BY AUTOMATED COUNT: 40.1 FL (ref 35.9–50)
GFR SERPL CREATININE-BSD FRML MDRD: >60 ML/MIN/1.73 M 2
GLOBULIN SER CALC-MCNC: 3 G/DL (ref 1.9–3.5)
GLUCOSE SERPL-MCNC: 93 MG/DL (ref 65–99)
GLUCOSE UR STRIP.AUTO-MCNC: NEGATIVE MG/DL
HCG UR QL: NEGATIVE
HCT VFR BLD AUTO: 43.7 % (ref 37–47)
HGB BLD-MCNC: 15.8 G/DL (ref 12–16)
IMM GRANULOCYTES # BLD AUTO: 0.02 K/UL (ref 0–0.11)
IMM GRANULOCYTES NFR BLD AUTO: 0.3 % (ref 0–0.9)
KETONES UR STRIP.AUTO-MCNC: NEGATIVE MG/DL
LEUKOCYTE ESTERASE UR QL STRIP.AUTO: ABNORMAL
LYMPHOCYTES # BLD AUTO: 2.88 K/UL (ref 1–4.8)
LYMPHOCYTES NFR BLD: 37.2 % (ref 22–41)
MCH RBC QN AUTO: 32.9 PG (ref 27–33)
MCHC RBC AUTO-ENTMCNC: 36.2 G/DL (ref 33.6–35)
MCV RBC AUTO: 91 FL (ref 81.4–97.8)
MONOCYTES # BLD AUTO: 1.04 K/UL (ref 0–0.85)
MONOCYTES NFR BLD AUTO: 13.4 % (ref 0–13.4)
NEUTROPHILS # BLD AUTO: 3.68 K/UL (ref 2–7.15)
NEUTROPHILS NFR BLD: 47.4 % (ref 44–72)
NITRITE UR QL STRIP.AUTO: NEGATIVE
NRBC # BLD AUTO: 0 K/UL
NRBC BLD AUTO-RTO: 0 /100 WBC
PH UR STRIP.AUTO: 5 [PH]
PLATELET # BLD AUTO: 197 K/UL (ref 164–446)
PMV BLD AUTO: 9.3 FL (ref 9–12.9)
POTASSIUM SERPL-SCNC: 3.3 MMOL/L (ref 3.6–5.5)
PROT SERPL-MCNC: 7.3 G/DL (ref 6–8.2)
PROT UR QL STRIP: NEGATIVE MG/DL
RBC # BLD AUTO: 4.8 M/UL (ref 4.2–5.4)
RBC UR QL AUTO: NEGATIVE
SODIUM SERPL-SCNC: 137 MMOL/L (ref 135–145)
SP GR UR STRIP.AUTO: <=1.005
TROPONIN I SERPL-MCNC: <0.02 NG/ML (ref 0–0.04)
WBC # BLD AUTO: 7.8 K/UL (ref 4.8–10.8)

## 2017-10-12 PROCEDURE — 99284 EMERGENCY DEPT VISIT MOD MDM: CPT

## 2017-10-12 PROCEDURE — 700102 HCHG RX REV CODE 250 W/ 637 OVERRIDE(OP): Performed by: EMERGENCY MEDICINE

## 2017-10-12 PROCEDURE — 71275 CT ANGIOGRAPHY CHEST: CPT

## 2017-10-12 PROCEDURE — 81002 URINALYSIS NONAUTO W/O SCOPE: CPT

## 2017-10-12 PROCEDURE — 81025 URINE PREGNANCY TEST: CPT

## 2017-10-12 PROCEDURE — 80053 COMPREHEN METABOLIC PANEL: CPT

## 2017-10-12 PROCEDURE — 84484 ASSAY OF TROPONIN QUANT: CPT

## 2017-10-12 PROCEDURE — 93005 ELECTROCARDIOGRAM TRACING: CPT | Performed by: EMERGENCY MEDICINE

## 2017-10-12 PROCEDURE — 85025 COMPLETE CBC W/AUTO DIFF WBC: CPT

## 2017-10-12 PROCEDURE — 700117 HCHG RX CONTRAST REV CODE 255: Performed by: EMERGENCY MEDICINE

## 2017-10-12 PROCEDURE — 93005 ELECTROCARDIOGRAM TRACING: CPT

## 2017-10-12 PROCEDURE — A9270 NON-COVERED ITEM OR SERVICE: HCPCS | Performed by: EMERGENCY MEDICINE

## 2017-10-12 PROCEDURE — 36415 COLL VENOUS BLD VENIPUNCTURE: CPT

## 2017-10-12 RX ORDER — CLONAZEPAM 0.5 MG/1
0.25 TABLET ORAL 2 TIMES DAILY PRN
Qty: 5 TAB | Refills: 0 | Status: SHIPPED | OUTPATIENT
Start: 2017-10-12 | End: 2017-12-14

## 2017-10-12 RX ORDER — ASPIRIN 81 MG/1
324 TABLET, CHEWABLE ORAL ONCE
Status: COMPLETED | OUTPATIENT
Start: 2017-10-12 | End: 2017-10-12

## 2017-10-12 RX ORDER — CLONAZEPAM 0.5 MG/1
0.5 TABLET ORAL ONCE
Status: COMPLETED | OUTPATIENT
Start: 2017-10-12 | End: 2017-10-12

## 2017-10-12 RX ORDER — ASPIRIN 600 MG/1
300 SUPPOSITORY RECTAL ONCE
Status: COMPLETED | OUTPATIENT
Start: 2017-10-12 | End: 2017-10-12

## 2017-10-12 RX ADMIN — ASPIRIN 81 MG 324 MG: 81 TABLET ORAL at 20:34

## 2017-10-12 RX ADMIN — CLONAZEPAM 0.5 MG: 0.5 TABLET ORAL at 20:34

## 2017-10-12 RX ADMIN — IOHEXOL 80 ML: 350 INJECTION, SOLUTION INTRAVENOUS at 21:46

## 2017-10-12 ASSESSMENT — ENCOUNTER SYMPTOMS
NAUSEA: 0
VOMITING: 0
SORE THROAT: 0
HEADACHES: 0
DIAPHORESIS: 0
DIZZINESS: 0
COUGH: 0
BACK PAIN: 0
SHORTNESS OF BREATH: 1
NECK PAIN: 0
CHILLS: 0
PALPITATIONS: 0
ABDOMINAL PAIN: 0
LOSS OF CONSCIOUSNESS: 0
ORTHOPNEA: 0
WEIGHT LOSS: 0
FLANK PAIN: 0

## 2017-10-12 ASSESSMENT — PAIN SCALES - GENERAL: PAINLEVEL_OUTOF10: 4

## 2017-10-13 NOTE — DISCHARGE INSTRUCTIONS
Chest Pain, Nonspecific  It is often hard to give a specific diagnosis for the cause of chest pain. There is always a chance that your pain could be related to something serious, like a heart attack or a blood clot in the lungs. You need to follow up with your caregiver for further evaluation. More lab tests or other studies such as X-rays, electrocardiography, stress testing, or cardiac imaging may be needed to find the cause of your pain.  Most of the time, nonspecific chest pain improves within 2 to 3 days with rest and mild pain medicine. For the next few days, avoid physical exertion or activities that bring on pain. Do not smoke. Avoid drinking alcohol. Call your caregiver for routine follow-up as advised.   SEEK IMMEDIATE MEDICAL CARE IF:  · You develop increased chest pain or pain that radiates to the arm, neck, jaw, back, or abdomen.   · You develop shortness of breath, increased coughing, or you start coughing up blood.   · You have severe back or abdominal pain, nausea, or vomiting.   · You develop severe weakness, fainting, fever, or chills.   Document Released: 12/18/2006 Document Revised: 03/11/2013 Document Reviewed: 06/06/2008  [employer]® Patient Information ©2013 [employer].  Panic Attacks  Panic attacks are sudden, short-lived surges of severe anxiety, fear, or discomfort. They may occur for no reason when you are relaxed, when you are anxious, or when you are sleeping. Panic attacks may occur for a number of reasons:   · Healthy people occasionally have panic attacks in extreme, life-threatening situations, such as war or natural disasters. Normal anxiety is a protective mechanism of the body that helps us react to danger (fight or flight response).  · Panic attacks are often seen with anxiety disorders, such as panic disorder, social anxiety disorder, generalized anxiety disorder, and phobias. Anxiety disorders cause excessive or uncontrollable anxiety. They may interfere with your  relationships or other life activities.  · Panic attacks are sometimes seen with other mental illnesses, such as depression and posttraumatic stress disorder.  · Certain medical conditions, prescription medicines, and drugs of abuse can cause panic attacks.  SYMPTOMS   Panic attacks start suddenly, peak within 20 minutes, and are accompanied by four or more of the following symptoms:  · Pounding heart or fast heart rate (palpitations).  · Sweating.  · Trembling or shaking.  · Shortness of breath or feeling smothered.  · Feeling choked.  · Chest pain or discomfort.  · Nausea or strange feeling in your stomach.  · Dizziness, light-headedness, or feeling like you will faint.  · Chills or hot flushes.  · Numbness or tingling in your lips or hands and feet.  · Feeling that things are not real or feeling that you are not yourself.  · Fear of losing control or going crazy.  · Fear of dying.  Some of these symptoms can mimic serious medical conditions. For example, you may think you are having a heart attack. Although panic attacks can be very scary, they are not life threatening.  DIAGNOSIS   Panic attacks are diagnosed through an assessment by your health care provider. Your health care provider will ask questions about your symptoms, such as where and when they occurred. Your health care provider will also ask about your medical history and use of alcohol and drugs, including prescription medicines. Your health care provider may order blood tests or other studies to rule out a serious medical condition. Your health care provider may refer you to a mental health professional for further evaluation.  TREATMENT   · Most healthy people who have one or two panic attacks in an extreme, life-threatening situation will not require treatment.  · The treatment for panic attacks associated with anxiety disorders or other mental illness typically involves counseling with a mental health professional, medicine, or a combination of  both. Your health care provider will help determine what treatment is best for you.  · Panic attacks due to physical illness usually go away with treatment of the illness. If prescription medicine is causing panic attacks, talk with your health care provider about stopping the medicine, decreasing the dose, or substituting another medicine.  · Panic attacks due to alcohol or drug abuse go away with abstinence. Some adults need professional help in order to stop drinking or using drugs.  HOME CARE INSTRUCTIONS   · Take all medicines as directed by your health care provider.    · Schedule and attend follow-up visits as directed by your health care provider. It is important to keep all your appointments.  SEEK MEDICAL CARE IF:  · You are not able to take your medicines as prescribed.  · Your symptoms do not improve or get worse.  SEEK IMMEDIATE MEDICAL CARE IF:   · You experience panic attack symptoms that are different than your usual symptoms.  · You have serious thoughts about hurting yourself or others.  · You are taking medicine for panic attacks and have a serious side effect.  MAKE SURE YOU:  · Understand these instructions.  · Will watch your condition.  · Will get help right away if you are not doing well or get worse.     This information is not intended to replace advice given to you by your health care provider. Make sure you discuss any questions you have with your health care provider.     Document Released: 12/18/2006 Document Revised: 12/23/2014 Document Reviewed: 08/01/2014  [employer] Interactive Patient Education ©2016 [employer]

## 2017-10-13 NOTE — ED NOTES
Pt bib self with c/o of CP for past 2 weeks. Pt states she has panic attacks and this feels similar. States she has barely left her house lately due to anxiety.
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
## 2017-10-13 NOTE — ED NOTES
Pt given verbal and written discharge instructions with one prescription. Verbalized understanding.

## 2017-12-14 ENCOUNTER — OFFICE VISIT (OUTPATIENT)
Dept: INTERNAL MEDICINE | Facility: MEDICAL CENTER | Age: 27
End: 2017-12-14
Payer: MEDICAID

## 2017-12-14 VITALS
BODY MASS INDEX: 17.71 KG/M2 | SYSTOLIC BLOOD PRESSURE: 130 MMHG | HEART RATE: 88 BPM | TEMPERATURE: 98.2 F | DIASTOLIC BLOOD PRESSURE: 92 MMHG | OXYGEN SATURATION: 97 % | HEIGHT: 67 IN | WEIGHT: 112.8 LBS | RESPIRATION RATE: 16 BRPM

## 2017-12-14 DIAGNOSIS — Z00.00 HEALTH CARE MAINTENANCE: ICD-10-CM

## 2017-12-14 DIAGNOSIS — L63.9 ALOPECIA AREATA: ICD-10-CM

## 2017-12-14 DIAGNOSIS — R53.83 FATIGUE, UNSPECIFIED TYPE: ICD-10-CM

## 2017-12-14 DIAGNOSIS — F17.200 TOBACCO USE DISORDER: ICD-10-CM

## 2017-12-14 PROCEDURE — 99204 OFFICE O/P NEW MOD 45 MIN: CPT | Mod: GC | Performed by: INTERNAL MEDICINE

## 2017-12-14 RX ORDER — CLOBETASOL PROPIONATE 0.46 MG/ML
2 SOLUTION TOPICAL 2 TIMES DAILY
Qty: 1 BOTTLE | Refills: 0 | Status: SHIPPED | OUTPATIENT
Start: 2017-12-14 | End: 2018-10-21 | Stop reason: CLARIF

## 2017-12-14 ASSESSMENT — PATIENT HEALTH QUESTIONNAIRE - PHQ9: CLINICAL INTERPRETATION OF PHQ2 SCORE: 0

## 2017-12-14 ASSESSMENT — PAIN SCALES - GENERAL: PAINLEVEL: NO PAIN
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
## 2017-12-14 NOTE — PATIENT INSTRUCTIONS
Alopecia Areata  Alopecia areata is a type of hair loss. If you have this condition, you may lose hair on your scalp in patches. In some cases, you may lose all the hair on your scalp (alopecia totalis) or all the hair from your face and body (alopecia universalis).   Alopecia areata is an autoimmune disease. This means your body's defense system (immune system) mistakes normal parts of your body for germs or other things that can make you sick. When you have alopecia areata, your immune system attacks your hair follicles.   Alopecia areata often starts during childhood but can occur at any age. Alopecia areata is not a danger to your health but can be stressful.   CAUSES   The cause of alopecia areata is unknown.   RISK FACTORS  You may be at higher risk of alopecia areata if you:   · Have a family history of alopecia.  · Have a family history of another autoimmune disease, including type 1 diabetes and rheumatoid arthritis.  SIGNS AND SYMPTOMS  Signs of alopecia areata may include:  · Loss of scalp hair in small, round patches. These may be about the size of a quarter.  · Loss of all hair on your scalp.  · Loss of eyebrow hair, facial hair, or the hair inside your nose (nasal hair).  · Hair loss over your entire body.  DIAGNOSIS   Alopecia areata may be diagnosed by:  · Medical history and physical exam.  · Taking a sample of hair to check under a microscope.  · Taking a small piece of skin (biopsy) to examine under a microscope.  · Blood tests to rule out other autoimmune diseases.  TREATMENT   There is no cure for alopecia areata, but the disease often goes away over time. You will not lose the ability to regrow hair. Some medicines may help your hair regrow more quickly. These include:  · Corticosteroids. These block inflammation caused by your immune system. You may get this medicine as a lotion for your skin or as an injection.  · Minoxidil. This is a hair growth medicine you can use in areas of hair  loss.  · Anthralin. This is a medicine for a skin inflammation called psoriasis that may also help alopecia.  · Diphencyprone. This medicine is applied to your skin and may stimulate hair growth.  HOME CARE INSTRUCTIONS  · Use sunscreen or cover your head when outdoors.  · Take medicines only as directed by your health care provider.  · If you have lost your eyebrows, wear sunglasses outside to keep dust out of your eyes.  · If you have lost hair inside your nose, wear a kerchief over your face or apply ointment to the inside of your nose. This keeps out dust and other irritants.  · Keep all follow-up visits as directed by your health care provider. This is important.  SEEK MEDICAL CARE IF:  · Your symptoms change.  · You have new symptoms.  · You have a reaction to your medicines.  · You are struggling emotionally.     This information is not intended to replace advice given to you by your health care provider. Make sure you discuss any questions you have with your health care provider.     Document Released: 07/22/2005 Document Revised: 01/08/2016 Document Reviewed: 03/09/2015  [employer] Interactive Patient Education ©2016 [employer]

## 2018-02-13 ENCOUNTER — OFFICE VISIT (OUTPATIENT)
Dept: INTERNAL MEDICINE | Facility: MEDICAL CENTER | Age: 28
End: 2018-02-13
Payer: MEDICAID

## 2018-02-13 VITALS — BODY MASS INDEX: 18.65 KG/M2 | HEIGHT: 67 IN | WEIGHT: 118.8 LBS

## 2018-02-13 DIAGNOSIS — L81.4 LENTIGO: ICD-10-CM

## 2018-02-13 DIAGNOSIS — L63.9 ALOPECIA AREATA: ICD-10-CM

## 2018-02-13 PROCEDURE — 99202 OFFICE O/P NEW SF 15 MIN: CPT | Mod: 25 | Performed by: DERMATOLOGY

## 2018-02-13 PROCEDURE — 11900 INJECT SKIN LESIONS </W 7: CPT | Performed by: DERMATOLOGY

## 2018-02-13 RX ORDER — TRIAMCINOLONE ACETONIDE 40 MG/ML
10 INJECTION, SUSPENSION INTRA-ARTICULAR; INTRAMUSCULAR ONCE
Status: COMPLETED | OUTPATIENT
Start: 2018-02-13 | End: 2018-02-13

## 2018-02-13 RX ADMIN — TRIAMCINOLONE ACETONIDE 10 MG: 40 INJECTION, SUSPENSION INTRA-ARTICULAR; INTRAMUSCULAR at 10:35

## 2018-02-13 NOTE — PROGRESS NOTES
"Nichol Chaney  1990  [id number]    Consultation             Vitals:    02/13/18 0937   Weight: 53.9 kg (118 lb 12.8 oz)   Height: 1.702 m (5' 7\")       Chief Complaint   Patient presents with   • New Patient     Alopecia      ___________________________________________________________________            History of Present Illness (HPI) c/o hair loss x about 5 years ago. After birth of son, pt   Was under more stress. Pt saw dermatologist in [place] and treated with IL steroid which   Helped. In past few years, has had a few episodes but no injections. Pt noticed stress  May trigger hair loss. Tried minoxidil but irritated. Now just using clobetasol twice a week.   Helping a little for hair growth. No new spots, just big spot on back of scalp.         Dr. House has referred for a consultation to evaluate hair    Current Outpatient Prescriptions on File Prior to Visit   Medication Sig Dispense Refill   • clobetasol (TEMOVATE) 0.05 % external solution Apply 2 mL to affected area(s) 2 times a day. 1 Bottle 0   • aspirin (ASA) 81 MG Chew Tab chewable tablet Take 81 mg by mouth every day.     • minoxidil (ROGAINE) 2 % external solution Apply to the area locally on the scalp once daily. 1 Bottle 0     No current facility-administered medications on file prior to visit.      Patient has no known allergies.      Review of Systems:        General: Denies fever      Hematologic: no excessive bleeding problems              Past Medical History:   Diagnosis Date   • DVT (deep venous thrombosis) (CMS-[facility])      Skin Cancer no h/o    Social History   Substance Use Topics   • Smoking status: Current Some Day Smoker     Packs/day: 0.25     Years: 1.00     Start date: 10/1/2016   • Smokeless tobacco: Never Used      Comment: 1/2 ppd   • Alcohol use Yes      Comment: two times a week     Sunscreen Use:Yes     History reviewed. No pertinent surgical history.        Family History   Problem Relation Age of Onset   • No Known " Problems Mother    • No Known Problems Father    • No Known Problems Sister    • No Known Problems Brother    • Breast Cancer Maternal Grandmother    • Stroke Maternal Grandmother    • Cancer Maternal Grandfather      squamous cell cancer of oral cavity         Physical Exam:   Constitutional: Well nourished, NAD, pleasant  alert and cooperative; normal mood and affect; normal attention span and concentration.    Skin face exam done: no suspicious lesions on eyelids, lips, face, ears, except as noted.  Face few brown macs  L side post scalp above neck about 5 x 6 cm with excellent hair growth about 1 cm  No hair loss of eyebrow, eyelashes  No spots of alopecia                    Assessment and Plan:   1. Alopecia areata  Pt getting excellent hair growth. Explained autoimmune process, seems to be better.   Pt has had CBC, thyroid, chem panel checked. Will IL kenalog today.   Try to use minoxidil once a week b/c less irritating. Add MVI with biotin.   Pt stopped coloring hair. Pt can use clobetasol topically twice a week.     Informed consent. Risk of atrophy, infection, bleeding. Alcohol prep. Kenalog 10 mg/cc x 3 cc injected locally into site. bandaid applied      2. [place]  Recommend follow for changes. ABCD's of changing skin lesions were reviewed, and the appropriate use of sunscreen was outlined and recommended.        Stephanie Amaro M.D.   Return if symptoms worsen or fail to improve.

## 2018-03-16 ENCOUNTER — APPOINTMENT (OUTPATIENT)
Dept: INTERNAL MEDICINE | Facility: MEDICAL CENTER | Age: 28
End: 2018-03-16
Payer: MEDICAID

## 2018-07-31 ENCOUNTER — HOSPITAL ENCOUNTER (EMERGENCY)
Facility: MEDICAL CENTER | Age: 28
End: 2018-07-31
Attending: EMERGENCY MEDICINE
Payer: MEDICAID

## 2018-07-31 ENCOUNTER — APPOINTMENT (OUTPATIENT)
Dept: RADIOLOGY | Facility: MEDICAL CENTER | Age: 28
End: 2018-07-31
Attending: EMERGENCY MEDICINE
Payer: MEDICAID

## 2018-07-31 ENCOUNTER — TELEPHONE (OUTPATIENT)
Dept: VASCULAR LAB | Facility: MEDICAL CENTER | Age: 28
End: 2018-07-31

## 2018-07-31 VITALS
OXYGEN SATURATION: 99 % | RESPIRATION RATE: 18 BRPM | HEART RATE: 78 BPM | DIASTOLIC BLOOD PRESSURE: 90 MMHG | SYSTOLIC BLOOD PRESSURE: 115 MMHG | WEIGHT: 116.84 LBS | BODY MASS INDEX: 18.3 KG/M2 | TEMPERATURE: 98.2 F

## 2018-07-31 DIAGNOSIS — M79.605 PAIN OF LEFT LOWER EXTREMITY: ICD-10-CM

## 2018-07-31 DIAGNOSIS — E87.6 HYPOKALEMIA: ICD-10-CM

## 2018-07-31 LAB
ALBUMIN SERPL BCP-MCNC: 4.6 G/DL (ref 3.2–4.9)
ALBUMIN/GLOB SERPL: 1.6 G/DL
ALP SERPL-CCNC: 63 U/L (ref 30–99)
ALT SERPL-CCNC: 18 U/L (ref 2–50)
ANION GAP SERPL CALC-SCNC: 13 MMOL/L (ref 0–11.9)
APTT PPP: 29.5 SEC (ref 24.7–36)
AST SERPL-CCNC: 26 U/L (ref 12–45)
BASOPHILS # BLD AUTO: 0.4 % (ref 0–1.8)
BASOPHILS # BLD: 0.03 K/UL (ref 0–0.12)
BILIRUB SERPL-MCNC: 0.6 MG/DL (ref 0.1–1.5)
BUN SERPL-MCNC: 7 MG/DL (ref 8–22)
CALCIUM SERPL-MCNC: 9 MG/DL (ref 8.4–10.2)
CHLORIDE SERPL-SCNC: 107 MMOL/L (ref 96–112)
CO2 SERPL-SCNC: 18 MMOL/L (ref 20–33)
CREAT SERPL-MCNC: 0.57 MG/DL (ref 0.5–1.4)
EOSINOPHIL # BLD AUTO: 0.07 K/UL (ref 0–0.51)
EOSINOPHIL NFR BLD: 0.9 % (ref 0–6.9)
ERYTHROCYTE [DISTWIDTH] IN BLOOD BY AUTOMATED COUNT: 39.8 FL (ref 35.9–50)
GLOBULIN SER CALC-MCNC: 2.9 G/DL (ref 1.9–3.5)
GLUCOSE SERPL-MCNC: 117 MG/DL (ref 65–99)
HCG SERPL QL: NEGATIVE
HCT VFR BLD AUTO: 39.7 % (ref 37–47)
HGB BLD-MCNC: 13.8 G/DL (ref 12–16)
IMM GRANULOCYTES # BLD AUTO: 0.02 K/UL (ref 0–0.11)
IMM GRANULOCYTES NFR BLD AUTO: 0.3 % (ref 0–0.9)
INR PPP: 0.97 (ref 0.87–1.13)
LYMPHOCYTES # BLD AUTO: 3.23 K/UL (ref 1–4.8)
LYMPHOCYTES NFR BLD: 43.6 % (ref 22–41)
MCH RBC QN AUTO: 32.9 PG (ref 27–33)
MCHC RBC AUTO-ENTMCNC: 34.8 G/DL (ref 33.6–35)
MCV RBC AUTO: 94.5 FL (ref 81.4–97.8)
MONOCYTES # BLD AUTO: 0.96 K/UL (ref 0–0.85)
MONOCYTES NFR BLD AUTO: 13 % (ref 0–13.4)
NEUTROPHILS # BLD AUTO: 3.1 K/UL (ref 2–7.15)
NEUTROPHILS NFR BLD: 41.8 % (ref 44–72)
NRBC # BLD AUTO: 0 K/UL
NRBC BLD-RTO: 0 /100 WBC
PLATELET # BLD AUTO: 241 K/UL (ref 164–446)
PMV BLD AUTO: 9.1 FL (ref 9–12.9)
POTASSIUM SERPL-SCNC: 2.7 MMOL/L (ref 3.6–5.5)
PROT SERPL-MCNC: 7.5 G/DL (ref 6–8.2)
PROTHROMBIN TIME: 12.8 SEC (ref 12–14.6)
RBC # BLD AUTO: 4.2 M/UL (ref 4.2–5.4)
SODIUM SERPL-SCNC: 138 MMOL/L (ref 135–145)
WBC # BLD AUTO: 7.4 K/UL (ref 4.8–10.8)

## 2018-07-31 PROCEDURE — 36415 COLL VENOUS BLD VENIPUNCTURE: CPT

## 2018-07-31 PROCEDURE — 84703 CHORIONIC GONADOTROPIN ASSAY: CPT

## 2018-07-31 PROCEDURE — 99284 EMERGENCY DEPT VISIT MOD MDM: CPT

## 2018-07-31 PROCEDURE — A9270 NON-COVERED ITEM OR SERVICE: HCPCS | Performed by: EMERGENCY MEDICINE

## 2018-07-31 PROCEDURE — 85025 COMPLETE CBC W/AUTO DIFF WBC: CPT

## 2018-07-31 PROCEDURE — 700102 HCHG RX REV CODE 250 W/ 637 OVERRIDE(OP): Performed by: EMERGENCY MEDICINE

## 2018-07-31 PROCEDURE — 93971 EXTREMITY STUDY: CPT | Mod: LT

## 2018-07-31 PROCEDURE — 85610 PROTHROMBIN TIME: CPT

## 2018-07-31 PROCEDURE — 85730 THROMBOPLASTIN TIME PARTIAL: CPT

## 2018-07-31 PROCEDURE — 80053 COMPREHEN METABOLIC PANEL: CPT

## 2018-07-31 RX ORDER — POTASSIUM CHLORIDE 20 MEQ/1
20 TABLET, EXTENDED RELEASE ORAL 2 TIMES DAILY
Qty: 14 TAB | Refills: 0 | Status: SHIPPED | OUTPATIENT
Start: 2018-07-31 | End: 2018-08-07

## 2018-07-31 RX ADMIN — POTASSIUM BICARBONATE 50 MEQ: 25 TABLET, EFFERVESCENT ORAL at 19:22

## 2018-07-31 ASSESSMENT — PAIN SCALES - GENERAL: PAINLEVEL_OUTOF10: 3

## 2018-08-01 NOTE — ED NOTES
Pt given written and oral discharge instructions. Pt verbalized understanding of all instructions given. All questions answered. VSS. IV removed. Pt given paper prescription as ordered and educated on use and side effects. Pt given f/u instructions and educated on s/s of when to return to the ER. Pt ambulating independently upon time of discharge in good condition.

## 2018-08-01 NOTE — ED PROVIDER NOTES
ED Provider Note    CHIEF COMPLAINT  Chief Complaint   Patient presents with   • Leg Pain        HPI  Nichol Chaney is a 28 y.o. female who presents to the ED with complaints of left leg pain.  Patient states about a year ago she was diagnosed with a DVT she had been on medications for this followed by the [facility] vascular clinic.  The patient has been off this medicine since about January.  She states that over the past month she has been having continued pain to the left lower extremity she states that it feels like it is was when she had a blood clot before.  Patient denies any overt swelling to the area describes intermittent numbness she states that she is on her feet all the time at work.  Patient denies any shortness of breath fever chills nausea vomiting or any other symptoms.    REVIEW OF SYSTEMS  See HPI for further details. All other systems are negative.     PAST MEDICAL HISTORY  Past Medical History:   Diagnosis Date   • DVT (deep venous thrombosis) (HCC)        FAMILY HISTORY  Family History   Problem Relation Age of Onset   • No Known Problems Mother    • No Known Problems Father    • No Known Problems Sister    • No Known Problems Brother    • Breast Cancer Maternal Grandmother    • Stroke Maternal Grandmother    • Cancer Maternal Grandfather         squamous cell cancer of oral cavity     Patient's family history has been discussed and is been found to be noncontributory to his present illness  SOCIAL HISTORY  Social History     Social History   • Marital status:      Spouse name: N/A   • Number of children: N/A   • Years of education: N/A     Social History Main Topics   • Smoking status: Current Some Day Smoker     Packs/day: 0.25     Years: 1.00     Start date: 10/1/2016   • Smokeless tobacco: Never Used      Comment: 1/2 ppd   • Alcohol use Yes      Comment: two times a week   • Drug use: No   • Sexual activity: Yes     Partners: Male     Other Topics Concern   • Not on file     Social  History Narrative   • No narrative on file           SURGICAL HISTORY  History reviewed. No pertinent surgical history.    CURRENT MEDICATIONS   Home Medications    **Home medications have not yet been reviewed for this encounter**       No current facility-administered medications on file prior to encounter.      Current Outpatient Prescriptions on File Prior to Encounter   Medication Sig Dispense Refill   • clobetasol (TEMOVATE) 0.05 % external solution Apply 2 mL to affected area(s) 2 times a day. 1 Bottle 0   • minoxidil (ROGAINE) 2 % external solution Apply to the area locally on the scalp once daily. 1 Bottle 0   • aspirin (ASA) 81 MG Chew Tab chewable tablet Take 81 mg by mouth every day.         ALLERGIES   No Known Allergies    PHYSICAL EXAM  VITAL SIGNS: /79   Pulse 78   Temp 37 °C (98.6 °F)   Resp 18   Wt 53 kg (116 lb 13.5 oz)   SpO2 95%   BMI 18.30 kg/m²    Pulse Ox interpretation nonhypoxic    Constitutional: Well developed, Well nourished, No acute distress, Non-toxic appearance.   Cardiovascular: Regular rate and rhythm without murmurs gallops or rubs.   Thorax & Lungs: Lungs are clear to auscultation bilaterally, there are no wheezes no rales. Chest wall is nontender.  Abdomen: Soft, nontender nondistended. Bowel sounds are present.   Skin: Warm, Dry, No erythema,   Musculoskeletal: Intact distal pulses, no clubbing, no cyanosis, no edema no tenderness in the calf.  There is no edema.  The patient has good range of motion of the leg itself.  Strength is 5/5 throughout  Neurologic: Alert & oriented x 3, Normal motor function, Normal sensory function, No focal deficits noted.  DTRs are 2+ and equal throughout.    RADIOLOGY/PROCEDURES  US-EXTREMITY VENOUS UNILATERAL-LOWER LEFT   Final Result      No evidence of left lower extremity deep venous thrombosis.        Results for orders placed or performed during the hospital encounter of 07/31/18   CBC WITH DIFFERENTIAL   Result Value Ref Range     WBC 7.4 4.8 - 10.8 K/uL    RBC 4.20 4.20 - 5.40 M/uL    Hemoglobin 13.8 12.0 - 16.0 g/dL    Hematocrit 39.7 37.0 - 47.0 %    MCV 94.5 81.4 - 97.8 fL    MCH 32.9 27.0 - 33.0 pg    MCHC 34.8 33.6 - 35.0 g/dL    RDW 39.8 35.9 - 50.0 fL    Platelet Count 241 164 - 446 K/uL    MPV 9.1 9.0 - 12.9 fL    Neutrophils-Polys 41.80 (L) 44.00 - 72.00 %    Lymphocytes 43.60 (H) 22.00 - 41.00 %    Monocytes 13.00 0.00 - 13.40 %    Eosinophils 0.90 0.00 - 6.90 %    Basophils 0.40 0.00 - 1.80 %    Immature Granulocytes 0.30 0.00 - 0.90 %    Nucleated RBC 0.00 /100 WBC    Neutrophils (Absolute) 3.10 2.00 - 7.15 K/uL    Lymphs (Absolute) 3.23 1.00 - 4.80 K/uL    Monos (Absolute) 0.96 (H) 0.00 - 0.85 K/uL    Eos (Absolute) 0.07 0.00 - 0.51 K/uL    Baso (Absolute) 0.03 0.00 - 0.12 K/uL    Immature Granulocytes (abs) 0.02 0.00 - 0.11 K/uL    NRBC (Absolute) 0.00 K/uL   COMP METABOLIC PANEL   Result Value Ref Range    Sodium 138 135 - 145 mmol/L    Potassium 2.7 (LL) 3.6 - 5.5 mmol/L    Chloride 107 96 - 112 mmol/L    Co2 18 (L) 20 - 33 mmol/L    Anion Gap 13.0 (H) 0.0 - 11.9    Glucose 117 (H) 65 - 99 mg/dL    Bun 7 (L) 8 - 22 mg/dL    Creatinine 0.57 0.50 - 1.40 mg/dL    Calcium 9.0 8.4 - 10.2 mg/dL    AST(SGOT) 26 12 - 45 U/L    ALT(SGPT) 18 2 - 50 U/L    Alkaline Phosphatase 63 30 - 99 U/L    Total Bilirubin 0.6 0.1 - 1.5 mg/dL    Albumin 4.6 3.2 - 4.9 g/dL    Total Protein 7.5 6.0 - 8.2 g/dL    Globulin 2.9 1.9 - 3.5 g/dL    A-G Ratio 1.6 g/dL   PROTHROMBIN TIME   Result Value Ref Range    PT 12.8 12.0 - 14.6 sec    INR 0.97 0.87 - 1.13   APTT   Result Value Ref Range    APTT 29.5 24.7 - 36.0 sec   HCG Qual Serum   Result Value Ref Range    Beta-Hcg Qualitative Serum Negative Negative   ESTIMATED GFR   Result Value Ref Range    GFR If African American >60 >60 mL/min/1.73 m 2    GFR If Non African American >60 >60 mL/min/1.73 m 2           COURSE & MEDICAL DECISION MAKING  Pertinent Labs & Imaging studies reviewed. (See chart for  details)  Patient presents for evaluation.  Clinically she has no significant tenderness in the area.  I did do an ultrasound is no signs of DVT.  Laboratory studies were drawn coags are normal and at this point she is slightly hypokalemic.  At this point we will do some potassium replacement as this could relate to some of the cramps that she is having.  At this point is recommended follow the primary care person for recheck and further outpatient treatment care in 1-2 weeks return as needed.    FINAL IMPRESSION  1. Hypokalemia    2. Pain of left lower extremity           The patient will return for new or worsening symptoms and is stable at the time of discharge.    The patient is referred to a primary physician for blood pressure management, diabetic screening, and for all other preventative health concerns.        DISPOSITION:  Patient will be discharged home in stable condition.    FOLLOW UP:  Your primary care provider    Schedule an appointment as soon as possible for a visit in 1 week  For re-check, Return if any symptoms worsen    [address], [address]  [facility] [id number] [phone]  Schedule an appointment as soon as possible for a visit        OUTPATIENT MEDICATIONS:  New Prescriptions    POTASSIUM CHLORIDE SA (KDUR) 20 MEQ TAB CR    Take 1 Tab by mouth 2 times a day for 7 days.               Electronically signed by: Carlos Gaston, 7/31/2018 5:35 PM

## 2018-08-01 NOTE — ED NOTES
Deirdre from Lab called with critical result of potassium 2.7 at 1843. Critical lab result read back to Martine.   Dr. Rodriguez notified of critical lab result at 1844.  Critical lab result read back by Dr. Rodriguez.

## 2018-08-01 NOTE — ED NOTES
Pt to ed c/o left leg pain for few weeks denies trauma. No swelling noted  Pt states feels similar to dvt in past

## 2018-08-01 NOTE — DISCHARGE INSTRUCTIONS
Hypokalemia  Hypokalemia means a low potassium level in the blood. Symptoms may include muscle weakness and cramping, fatigue, abdominal pain, vomiting, constipation, or irregularities of the heartbeat. Sometimes hypokalemia is discovered by your caregiver if you are taking certain medicines for high blood pressure or kidney disease.   Potassium is an electrolyte that helps regulate the amount of fluid in the body. It also stimulates muscle contraction and maintains a stable acid-base balance. If potassium levels go too low or too high, your health may be in danger. You are at risk for developing shock, heart, and lung problems. Hypokalemia can occur if you have excessive diarrhea, vomiting, or sweating. Potassium can be lost through your kidneys in the urine. Certain common medicines can also cause potassium loss, especially water pills (diuretics). The same is possible with cortisone medications or certain types of antibiotics. Low potassium can be dangerous if you are taking certain heart medicines. In diabetes, your potassium may fall after you take insulin, especially if your diabetes had been out of control for a while. In rare cases, potassium may be low because you are not getting enough in your diet.   In adults, a potassium level below 3.5 mEq/L is usually considered low.  Hypokalemia can be treated with potassium supplements taken by mouth and a diet that is high in potassium. Foods with high potassium content are:  · Peas, lentils, lima beans, nuts, and dried fruit.   · Whole grain and bran cereals and breads.   · Fresh fruit and vegetables. Examples include:   · Bananas.   · Cantaloupe.   · Grapefruit.   · Oranges.   · Tomatoes.   · Honeydew melons.   · Potatoes.   · Peaches.   · Orange and tomato juices.   · Meats.   See your caregiver as instructed for a follow-up blood test to be sure your potassium is back to normal.  SEEK MEDICAL CARE IF:   · You have nausea, vomiting, constipation, or abdominal  pain.   · You have palpitations or irregular heartbeats, chest pain or shortness of breath.   · You have muscle cramps or weakness or fatigue.   · You have lethargy.   SEEK IMMEDIATE MEDICAL CARE IF:   · You have paralysis.   · You have confusion or other mental status changes.   Document Released: 12/18/2006 Document Revised: 03/11/2013 Document Reviewed: 04/13/2011  Exit[employer]® Patient Information ©2013 [employer].Leg Cramps  Introduction  Leg cramps occur when a muscle or muscles tighten and you have no control over this tightening (involuntary muscle contraction). Muscle cramps can develop in any muscle, but the most common place is in the calf muscles of the leg. Those cramps can occur during exercise or when you are at rest. Leg cramps are painful, and they may last for a few seconds to a few minutes. Cramps may return several times before they finally stop.  Usually, leg cramps are not caused by a serious medical problem. In many cases, the cause is not known. Some common causes include:  · Overexertion.  · Overuse from repetitive motions, or doing the same thing over and over.  · Remaining in a certain position for a long period of time.  · Improper preparation, form, or technique while performing a sport or an activity.  · Dehydration.  · Injury.  · Side effects of some medicines.  · Abnormally low levels of the salts and ions in your blood (electrolytes), especially potassium and calcium. These levels could be low if you are taking water pills (diuretics) or if you are pregnant.  Follow these instructions at home:  Watch your condition for any changes. Taking the following actions may help to lessen any discomfort that you are feeling:  · Stay well-hydrated. Drink enough fluid to keep your urine clear or pale yellow.  · Try massaging, stretching, and relaxing the affected muscle. Do this for several minutes at a time.  · For tight or tense muscles, use a warm towel, heating pad, or hot shower water  directed to the affected area.  · If you are sore or have pain after a cramp, applying ice to the affected area may relieve discomfort.  ¨ Put ice in a plastic bag.  ¨ Place a towel between your skin and the bag.  ¨ Leave the ice on for 20 minutes, 2-3 times per day.  · Avoid strenuous exercise for several days if you have been having frequent leg cramps.  · Make sure that your diet includes the essential minerals for your muscles to work normally.  · Take medicines only as directed by your health care provider.  Contact a health care provider if:  · Your leg cramps get more severe or more frequent, or they do not improve over time.  · Your foot becomes cold, numb, or blue.  This information is not intended to replace advice given to you by your health care provider. Make sure you discuss any questions you have with your health care provider.  Document Released: 01/25/2006 Document Revised: 05/25/2017 Document Reviewed: 11/25/2015  © 2017 [employer]vier

## 2018-08-07 ENCOUNTER — APPOINTMENT (OUTPATIENT)
Dept: VASCULAR LAB | Facility: MEDICAL CENTER | Age: 28
End: 2018-08-07
Payer: MEDICAID

## 2018-08-10 ENCOUNTER — TELEPHONE (OUTPATIENT)
Dept: VASCULAR LAB | Facility: MEDICAL CENTER | Age: 28
End: 2018-08-10

## 2018-08-10 DIAGNOSIS — I82.4Y9 ACUTE VENOUS EMBOLISM AND THROMBOSIS OF DEEP VESSELS OF PROXIMAL LOWER EXTREMITY, UNSPECIFIED LATERALITY (HCC): ICD-10-CM

## 2018-08-16 ENCOUNTER — TELEPHONE (OUTPATIENT)
Dept: VASCULAR LAB | Facility: MEDICAL CENTER | Age: 28
End: 2018-08-16

## 2018-08-16 NOTE — TELEPHONE ENCOUNTER
Spoke with patient over the phone. Patient states that she is taking ASA 81 mg. Patient also states she has received the lab orders in the mail and she will go [facility] [address] on Saturday 8/18/18.

## 2018-09-11 ENCOUNTER — TELEPHONE (OUTPATIENT)
Dept: VASCULAR LAB | Facility: MEDICAL CENTER | Age: 28
End: 2018-09-11

## 2018-09-11 ENCOUNTER — APPOINTMENT (OUTPATIENT)
Dept: VASCULAR LAB | Facility: MEDICAL CENTER | Age: 28
End: 2018-09-11
Payer: MEDICAID

## 2018-09-11 NOTE — TELEPHONE ENCOUNTER
"Received hypercoag results from [facility] dated 8/17/18. No factor V leiden or factor II mutation identified , normal d-dimer,and negative results for lupus anticoagulant, cardiolipin and B2GPI antibodies.  There were, however, several tests not performed d/t, \"no suitable specimen received.\"  Pt understands these are incomplete findings and that she will, unfortunately, have to repeat labwork for Prot C and S and antithrombin III.  She has previously had normal Pro S, but low Prot C.  Would like to verify these results.      Maricarmen Wright, [facility]  [facility]    "

## 2018-09-18 ENCOUNTER — TELEPHONE (OUTPATIENT)
Dept: VASCULAR LAB | Facility: MEDICAL CENTER | Age: 28
End: 2018-09-18

## 2018-09-18 NOTE — TELEPHONE ENCOUNTER
Received remainder of hypercoag panel from [facility], which was normal.  No identified predisposing factor for VTE.  Spoke with pt by phone; she will stay on ASA 81mg daily.  Discussed risks associated with invasive procedures and when to seek immediate care.   At this time, will discharge all further care to PCP.      ADAM Butler  [facility]

## 2018-10-21 ENCOUNTER — HOSPITAL ENCOUNTER (EMERGENCY)
Facility: MEDICAL CENTER | Age: 28
End: 2018-10-21
Attending: EMERGENCY MEDICINE
Payer: MEDICAID

## 2018-10-21 ENCOUNTER — APPOINTMENT (OUTPATIENT)
Dept: RADIOLOGY | Facility: MEDICAL CENTER | Age: 28
End: 2018-10-21
Attending: EMERGENCY MEDICINE
Payer: MEDICAID

## 2018-10-21 VITALS
WEIGHT: 117.5 LBS | TEMPERATURE: 97.6 F | OXYGEN SATURATION: 98 % | BODY MASS INDEX: 18.44 KG/M2 | RESPIRATION RATE: 21 BRPM | HEART RATE: 87 BPM | HEIGHT: 67 IN | SYSTOLIC BLOOD PRESSURE: 125 MMHG | DIASTOLIC BLOOD PRESSURE: 67 MMHG

## 2018-10-21 DIAGNOSIS — M79.605 PAIN OF LEFT LOWER EXTREMITY: ICD-10-CM

## 2018-10-21 DIAGNOSIS — F41.9 ANXIETY: ICD-10-CM

## 2018-10-21 DIAGNOSIS — E87.6 HYPOKALEMIA: ICD-10-CM

## 2018-10-21 DIAGNOSIS — R07.9 CHEST PAIN, UNSPECIFIED TYPE: ICD-10-CM

## 2018-10-21 LAB
ALBUMIN SERPL BCP-MCNC: 4.6 G/DL (ref 3.2–4.9)
ALBUMIN/GLOB SERPL: 1.5 G/DL
ALP SERPL-CCNC: 59 U/L (ref 30–99)
ALT SERPL-CCNC: 19 U/L (ref 2–50)
ANION GAP SERPL CALC-SCNC: 13 MMOL/L (ref 0–11.9)
APTT PPP: 32.7 SEC (ref 24.7–36)
AST SERPL-CCNC: 28 U/L (ref 12–45)
BASOPHILS # BLD AUTO: 0.5 % (ref 0–1.8)
BASOPHILS # BLD: 0.03 K/UL (ref 0–0.12)
BILIRUB SERPL-MCNC: 0.9 MG/DL (ref 0.1–1.5)
BNP SERPL-MCNC: 59 PG/ML (ref 0–100)
BUN SERPL-MCNC: 6 MG/DL (ref 8–22)
CALCIUM SERPL-MCNC: 9.3 MG/DL (ref 8.4–10.2)
CHLORIDE SERPL-SCNC: 110 MMOL/L (ref 96–112)
CO2 SERPL-SCNC: 18 MMOL/L (ref 20–33)
CREAT SERPL-MCNC: 0.68 MG/DL (ref 0.5–1.4)
EOSINOPHIL # BLD AUTO: 0.03 K/UL (ref 0–0.51)
EOSINOPHIL NFR BLD: 0.5 % (ref 0–6.9)
ERYTHROCYTE [DISTWIDTH] IN BLOOD BY AUTOMATED COUNT: 40.6 FL (ref 35.9–50)
GLOBULIN SER CALC-MCNC: 3 G/DL (ref 1.9–3.5)
GLUCOSE SERPL-MCNC: 88 MG/DL (ref 65–99)
HCG SERPL QL: NEGATIVE
HCT VFR BLD AUTO: 44.6 % (ref 37–47)
HGB BLD-MCNC: 15.3 G/DL (ref 12–16)
IMM GRANULOCYTES # BLD AUTO: 0.05 K/UL (ref 0–0.11)
IMM GRANULOCYTES NFR BLD AUTO: 0.8 % (ref 0–0.9)
INR PPP: 1.01 (ref 0.87–1.13)
LYMPHOCYTES # BLD AUTO: 2.88 K/UL (ref 1–4.8)
LYMPHOCYTES NFR BLD: 47.8 % (ref 22–41)
MCH RBC QN AUTO: 30.7 PG (ref 27–33)
MCHC RBC AUTO-ENTMCNC: 34.3 G/DL (ref 33.6–35)
MCV RBC AUTO: 89.6 FL (ref 81.4–97.8)
MONOCYTES # BLD AUTO: 0.56 K/UL (ref 0–0.85)
MONOCYTES NFR BLD AUTO: 9.3 % (ref 0–13.4)
NEUTROPHILS # BLD AUTO: 2.48 K/UL (ref 2–7.15)
NEUTROPHILS NFR BLD: 41.1 % (ref 44–72)
NRBC # BLD AUTO: 0 K/UL
NRBC BLD-RTO: 0 /100 WBC
PLATELET # BLD AUTO: 303 K/UL (ref 164–446)
PMV BLD AUTO: 9.3 FL (ref 9–12.9)
POTASSIUM SERPL-SCNC: 2.9 MMOL/L (ref 3.6–5.5)
PROT SERPL-MCNC: 7.6 G/DL (ref 6–8.2)
PROTHROMBIN TIME: 13.2 SEC (ref 12–14.6)
RBC # BLD AUTO: 4.98 M/UL (ref 4.2–5.4)
SODIUM SERPL-SCNC: 141 MMOL/L (ref 135–145)
TROPONIN I SERPL-MCNC: <0.01 NG/ML (ref 0–0.04)
WBC # BLD AUTO: 6 K/UL (ref 4.8–10.8)

## 2018-10-21 PROCEDURE — 96374 THER/PROPH/DIAG INJ IV PUSH: CPT

## 2018-10-21 PROCEDURE — 83880 ASSAY OF NATRIURETIC PEPTIDE: CPT

## 2018-10-21 PROCEDURE — 99285 EMERGENCY DEPT VISIT HI MDM: CPT

## 2018-10-21 PROCEDURE — 700102 HCHG RX REV CODE 250 W/ 637 OVERRIDE(OP): Performed by: EMERGENCY MEDICINE

## 2018-10-21 PROCEDURE — 85610 PROTHROMBIN TIME: CPT

## 2018-10-21 PROCEDURE — 84484 ASSAY OF TROPONIN QUANT: CPT

## 2018-10-21 PROCEDURE — A9270 NON-COVERED ITEM OR SERVICE: HCPCS | Performed by: EMERGENCY MEDICINE

## 2018-10-21 PROCEDURE — 85025 COMPLETE CBC W/AUTO DIFF WBC: CPT

## 2018-10-21 PROCEDURE — 93971 EXTREMITY STUDY: CPT | Mod: LT

## 2018-10-21 PROCEDURE — 93005 ELECTROCARDIOGRAM TRACING: CPT | Performed by: EMERGENCY MEDICINE

## 2018-10-21 PROCEDURE — 71275 CT ANGIOGRAPHY CHEST: CPT

## 2018-10-21 PROCEDURE — 85730 THROMBOPLASTIN TIME PARTIAL: CPT

## 2018-10-21 PROCEDURE — 700111 HCHG RX REV CODE 636 W/ 250 OVERRIDE (IP): Performed by: EMERGENCY MEDICINE

## 2018-10-21 PROCEDURE — 94760 N-INVAS EAR/PLS OXIMETRY 1: CPT

## 2018-10-21 PROCEDURE — 71045 X-RAY EXAM CHEST 1 VIEW: CPT

## 2018-10-21 PROCEDURE — 80053 COMPREHEN METABOLIC PANEL: CPT

## 2018-10-21 PROCEDURE — 84703 CHORIONIC GONADOTROPIN ASSAY: CPT

## 2018-10-21 PROCEDURE — 700117 HCHG RX CONTRAST REV CODE 255: Performed by: EMERGENCY MEDICINE

## 2018-10-21 RX ORDER — POTASSIUM CHLORIDE 20 MEQ/1
40 TABLET, EXTENDED RELEASE ORAL ONCE
Status: COMPLETED | OUTPATIENT
Start: 2018-10-21 | End: 2018-10-21

## 2018-10-21 RX ORDER — POTASSIUM CHLORIDE 20 MEQ/1
20 TABLET, EXTENDED RELEASE ORAL 2 TIMES DAILY
Qty: 10 TAB | Refills: 0 | Status: SHIPPED | OUTPATIENT
Start: 2018-10-21 | End: 2018-10-26

## 2018-10-21 RX ORDER — LORAZEPAM 2 MG/ML
1 INJECTION INTRAMUSCULAR ONCE
Status: COMPLETED | OUTPATIENT
Start: 2018-10-21 | End: 2018-10-21

## 2018-10-21 RX ADMIN — POTASSIUM CHLORIDE 40 MEQ: 1500 TABLET, EXTENDED RELEASE ORAL at 17:45

## 2018-10-21 RX ADMIN — LORAZEPAM 1 MG: 2 INJECTION INTRAMUSCULAR; INTRAVENOUS at 16:04

## 2018-10-21 RX ADMIN — IOHEXOL 75 ML: 350 INJECTION, SOLUTION INTRAVENOUS at 17:27

## 2018-10-21 ASSESSMENT — PAIN DESCRIPTION - DESCRIPTORS: DESCRIPTORS: NUMB;THROBBING

## 2018-10-21 NOTE — ED NOTES
Pt c/o SOB and LLE pain. Pt with compression stocking to LLE. Pt states hx DVT 2-3 years ago. Pt with call light in reach and arlene in low position for pt safety.

## 2018-10-21 NOTE — ED TRIAGE NOTES
"Amb to ED for eval of L upper leg pain..  Tight/throbbing/ numb at times.  Hx DVT to same region 2 years ago.  \"Feels the same\".  Pt was treated then w/ elequis and is now taking ASA 81mg daily.  Pt mildly tachycardic at 110 and states feeling SOB w/ excertion.    "

## 2018-10-21 NOTE — ED PROVIDER NOTES
ED Provider Note    CHIEF COMPLAINT  Chief Complaint   Patient presents with   • Shortness of Breath   • Leg Pain         HPI  Nichol Chaney is a 28 y.o. female who presents to the ED secondary to leg pain, chest pain, shortness of breath.  The patient has a history of DVT previously, is not on any anticoagulation now, she states over last 1-2 months she has been having increasing pain on the medial and lateral aspect of the left leg.  She has been waiting getting up short of breath from her sleep her last 1-2 weeks and then she is started getting some central chest heaviness, nonradiating, some burning, no improvement with antacids.  The patient states she has not had a bowel movement 3 weeks.  The patient does have anxiety, is concerned that she has a blood clot in her lungs.  Patient does not know if she is pregnant.    REVIEW OF SYSTEMS  See HPI for further details. All other systems are negative.     PAST MEDICAL HISTORY  Past Medical History:   Diagnosis Date   • DVT (deep venous thrombosis) (HCC)        FAMILY HISTORY  Family History   Problem Relation Age of Onset   • No Known Problems Mother    • No Known Problems Father    • No Known Problems Sister    • No Known Problems Brother    • Breast Cancer Maternal Grandmother    • Stroke Maternal Grandmother    • Cancer Maternal Grandfather         squamous cell cancer of oral cavity       SOCIAL HISTORY  Social History     Social History   • Marital status:      Spouse name: N/A   • Number of children: N/A   • Years of education: N/A     Social History Main Topics   • Smoking status: Former Smoker     Packs/day: 0.25     Years: 13.00     Types: Cigarettes     Start date: 10/15/2018   • Smokeless tobacco: Never Used      Comment: 1/2 ppd   • Alcohol use No   • Drug use: No   • Sexual activity: Yes     Partners: Male     Other Topics Concern   • Not on file     Social History Narrative   • No narrative on file       SURGICAL HISTORY  History reviewed. No  "pertinent surgical history.    CURRENT MEDICATIONS  Home Medications     Reviewed by Kiana Pierson (Pharmacy Tech) on 10/21/18 at 1520  Med List Status: Complete   Medication Last Dose Status   aspirin (ASA) 81 MG Chew Tab chewable tablet 10/21/2018 Active                ALLERGIES  No Known Allergies    PHYSICAL EXAM  VITAL SIGNS: /67   Pulse (!) 108   Temp 36.4 °C (97.6 °F)   Resp (!) 21   Ht 1.702 m (5' 7\")   Wt 53.3 kg (117 lb 8.1 oz)   LMP 09/09/2018 (Approximate)   SpO2 96%   BMI 18.40 kg/m²   Constitutional: Well developed, Well nourished, mild to moderate distress, Non-toxic appearance.   HENT: Normocephalic, Atraumatic, Bilateral external ears normal, Oropharynx moist, No oral exudates, Nose normal.   Eyes: PERRLA, EOMI, Conjunctiva normal, No discharge.   Neck: Normal range of motion, No tenderness, Supple, No stridor.   Cardiovascular: Tachycardic, good pulses  Thorax & Lungs: Clear to auscultation bilaterally  Abdomen: Bowel sounds normal, Soft, No tenderness, No masses, No pulsatile masses.   Skin: Warm, Dry, No erythema, No rash.   Back: No tenderness, No CVA tenderness.   Extremities: Intact distal pulses, No tenderness, No cyanosis, No clubbing.  No edema, she has a compression stocking on the left leg  Neurologic: Alert & oriented x 3, Normal motor function, Normal sensory function, No focal deficits noted.   Psych: Anxious, hyperventilating    Results for orders placed or performed during the hospital encounter of 10/21/18   CBC w/ Differential   Result Value Ref Range    WBC 6.0 4.8 - 10.8 K/uL    RBC 4.98 4.20 - 5.40 M/uL    Hemoglobin 15.3 12.0 - 16.0 g/dL    Hematocrit 44.6 37.0 - 47.0 %    MCV 89.6 81.4 - 97.8 fL    MCH 30.7 27.0 - 33.0 pg    MCHC 34.3 33.6 - 35.0 g/dL    RDW 40.6 35.9 - 50.0 fL    Platelet Count 303 164 - 446 K/uL    MPV 9.3 9.0 - 12.9 fL    Neutrophils-Polys 41.10 (L) 44.00 - 72.00 %    Lymphocytes 47.80 (H) 22.00 - 41.00 %    Monocytes 9.30 0.00 - 13.40 " %    Eosinophils 0.50 0.00 - 6.90 %    Basophils 0.50 0.00 - 1.80 %    Immature Granulocytes 0.80 0.00 - 0.90 %    Nucleated RBC 0.00 /100 WBC    Neutrophils (Absolute) 2.48 2.00 - 7.15 K/uL    Lymphs (Absolute) 2.88 1.00 - 4.80 K/uL    Monos (Absolute) 0.56 0.00 - 0.85 K/uL    Eos (Absolute) 0.03 0.00 - 0.51 K/uL    Baso (Absolute) 0.03 0.00 - 0.12 K/uL    Immature Granulocytes (abs) 0.05 0.00 - 0.11 K/uL    NRBC (Absolute) 0.00 K/uL   Complete Metabolic Panel (CMP)   Result Value Ref Range    Sodium 141 135 - 145 mmol/L    Potassium 2.9 (L) 3.6 - 5.5 mmol/L    Chloride 110 96 - 112 mmol/L    Co2 18 (L) 20 - 33 mmol/L    Anion Gap 13.0 (H) 0.0 - 11.9    Glucose 88 65 - 99 mg/dL    Bun 6 (L) 8 - 22 mg/dL    Creatinine 0.68 0.50 - 1.40 mg/dL    Calcium 9.3 8.4 - 10.2 mg/dL    AST(SGOT) 28 12 - 45 U/L    ALT(SGPT) 19 2 - 50 U/L    Alkaline Phosphatase 59 30 - 99 U/L    Total Bilirubin 0.9 0.1 - 1.5 mg/dL    Albumin 4.6 3.2 - 4.9 g/dL    Total Protein 7.6 6.0 - 8.2 g/dL    Globulin 3.0 1.9 - 3.5 g/dL    A-G Ratio 1.5 g/dL   APTT   Result Value Ref Range    APTT 32.7 24.7 - 36.0 sec   PT/INR   Result Value Ref Range    PT 13.2 12.0 - 14.6 sec    INR 1.01 0.87 - 1.13   HCG Qual Serum   Result Value Ref Range    Beta-Hcg Qualitative Serum Negative Negative   ESTIMATED GFR   Result Value Ref Range    GFR If African American >60 >60 mL/min/1.73 m 2    GFR If Non African American >60 >60 mL/min/1.73 m 2   EKG   Result Value Ref Range    Report       [facility] Emergency Dept.    Test Date:  2018-10-21  Pt Name:    LADARIUS DAY                  Department: [facility]  MRN:        [id number]                      [facility]  Gender:     Female                       Technician: [id number]  :        1990                   Requested By:TANO GUTIERREZ  Order #:    [id number]                    Reading MD:    Measurements  Intervals                                Axis  Rate:       91                            P:          63  GA:         136                          QRS:        49  QRSD:       84                           T:          31  QT:         380  QTc:        468    Interpretive Statements  SINUS RHYTHM  Compared to ECG 10/12/2017 19:41:26  No significant changes          RADIOLOGY/PROCEDURES  CT-CTA CHEST PULMONARY ARTERY W/ RECONS   Final Result      No central or large segmental pulmonary embolus is identified.      No acute cardiopulmonary process is seen.                  US-EXTREMITY VENOUS LOWER UNILAT LEFT   Final Result      No evidence of left lower extremity deep venous thrombosis.                  DX-CHEST-PORTABLE (1 VIEW)   Final Result      No evidence of acute cardiopulmonary process.            COURSE & MEDICAL DECISION MAKING  Pertinent Labs & Imaging studies reviewed. (See chart for details)  [facility] to leg pain, chest pain, shortness of breath, history of DVT, patient is tachycardic, anxious.  Due to the possibility of a DVT or PE, will get a PE study, get a lower extremity ultrasound.  Give the patient some Ativan for her anxiety.    Ultrasound was negative, CT scan was negative, the patient does have hypokalemia, was much improved after anxiety medicines.  We will give the patient oral potassium, put the patient on potassium, have the patient follow-up with the primary care physician, have the patient return with any other concerns.        FINAL IMPRESSION  1. Pain of left lower extremity    2. Chest pain, unspecified type    3. Anxiety    4. Hypokalemia        Patient referred to primary care provider for blood pressure management     This dictation was created using voice recognition software. The accuracy of the dictation is limited to the abilities of the software. I expect there may be some errors of grammar and possibly content. The nursing notes were reviewed and certain aspects of this information were incorporated into this note.    Electronically signed  by: Cong Ball, 10/21/2018 3:47 PM

## 2018-10-24 LAB — EKG IMPRESSION: NORMAL

## 2018-11-17 ENCOUNTER — HOSPITAL ENCOUNTER (EMERGENCY)
Facility: MEDICAL CENTER | Age: 28
End: 2018-11-17
Attending: EMERGENCY MEDICINE
Payer: MEDICAID

## 2018-11-17 ENCOUNTER — APPOINTMENT (OUTPATIENT)
Dept: RADIOLOGY | Facility: MEDICAL CENTER | Age: 28
End: 2018-11-17
Attending: EMERGENCY MEDICINE
Payer: MEDICAID

## 2018-11-17 VITALS
SYSTOLIC BLOOD PRESSURE: 124 MMHG | TEMPERATURE: 98.6 F | BODY MASS INDEX: 18.34 KG/M2 | RESPIRATION RATE: 18 BRPM | HEIGHT: 67 IN | WEIGHT: 116.84 LBS | HEART RATE: 74 BPM | DIASTOLIC BLOOD PRESSURE: 74 MMHG | OXYGEN SATURATION: 98 %

## 2018-11-17 VITALS
OXYGEN SATURATION: 96 % | WEIGHT: 116.84 LBS | HEART RATE: 84 BPM | BODY MASS INDEX: 18.34 KG/M2 | RESPIRATION RATE: 16 BRPM | DIASTOLIC BLOOD PRESSURE: 90 MMHG | TEMPERATURE: 99.3 F | HEIGHT: 67 IN | SYSTOLIC BLOOD PRESSURE: 117 MMHG

## 2018-11-17 DIAGNOSIS — S63.501A SPRAIN OF RIGHT WRIST, INITIAL ENCOUNTER: ICD-10-CM

## 2018-11-17 DIAGNOSIS — S60.222A CONTUSION OF LEFT HAND, INITIAL ENCOUNTER: ICD-10-CM

## 2018-11-17 DIAGNOSIS — S69.92XA INJURY OF LEFT WRIST, INITIAL ENCOUNTER: ICD-10-CM

## 2018-11-17 DIAGNOSIS — S60.221A CONTUSION OF RIGHT HAND, INITIAL ENCOUNTER: ICD-10-CM

## 2018-11-17 PROCEDURE — 73110 X-RAY EXAM OF WRIST: CPT | Mod: LT

## 2018-11-17 PROCEDURE — 99284 EMERGENCY DEPT VISIT MOD MDM: CPT

## 2018-11-17 PROCEDURE — 29125 APPL SHORT ARM SPLINT STATIC: CPT

## 2018-11-17 PROCEDURE — 99283 EMERGENCY DEPT VISIT LOW MDM: CPT

## 2018-11-17 PROCEDURE — 73130 X-RAY EXAM OF HAND: CPT | Mod: LT

## 2018-11-17 RX ORDER — HYDROCODONE BITARTRATE AND ACETAMINOPHEN 5; 325 MG/1; MG/1
1-2 TABLET ORAL EVERY 6 HOURS PRN
Qty: 10 TAB | Refills: 0 | Status: SHIPPED | OUTPATIENT
Start: 2018-11-17 | End: 2018-11-21

## 2018-11-17 ASSESSMENT — PAIN SCALES - GENERAL
PAINLEVEL_OUTOF10: 2
PAINLEVEL_OUTOF10: 3

## 2018-11-17 NOTE — ED PROVIDER NOTES
"ED Provider Note    CHIEF COMPLAINT   Chief Complaint   Patient presents with   • Hand Injury   • Wrist Injury       HPI   Nichol Chaney is a 28 y.o. female who presents complaining of right hand and wrist injury after slamming her hand in a car door last night.  She states today it hurt when she moved.  She denies numbness tingling or weakness.  She denied elbow pain.  She states hurts over her distal radius and distal second metacarpal    REVIEW OF SYSTEMS   See HPI for further details.  No cough or cold symptoms.  No numbness tingling or weakness.    PAST MEDICAL HISTORY   Past Medical History:   Diagnosis Date   • DVT (deep venous thrombosis) (HCC)        FAMILY HISTORY  Family History   Problem Relation Age of Onset   • No Known Problems Mother    • No Known Problems Father    • No Known Problems Sister    • No Known Problems Brother    • Breast Cancer Maternal Grandmother    • Stroke Maternal Grandmother    • Cancer Maternal Grandfather         squamous cell cancer of oral cavity       SOCIAL HISTORY  Social History     Social History   • Marital status:      Spouse name: N/A   • Number of children: N/A   • Years of education: N/A     Social History Main Topics   • Smoking status: Former Smoker     Packs/day: 0.25     Years: 13.00     Types: Cigarettes     Start date: 10/15/2018   • Smokeless tobacco: Never Used      Comment: 1/2 ppd   • Alcohol use No   • Drug use: No   • Sexual activity: Yes     Partners: Male     Other Topics Concern   • Not on file     Social History Narrative   • No narrative on file       SURGICAL HISTORY  History reviewed. No pertinent surgical history.    CURRENT MEDICATIONS   Home Medications    **Home medications have not yet been reviewed for this encounter**         ALLERGIES   No Known Allergies    PHYSICAL EXAM  VITAL SIGNS: /87   Pulse 78   Temp 37 °C (98.6 °F) (Temporal)   Resp 18   Ht 1.702 m (5' 7\")   Wt 53 kg (116 lb 13.5 oz)   LMP 10/17/2018   SpO2 " 98%   BMI 18.30 kg/m²   Constitutional: Well developed, Well nourished, No acute distress, Non-toxic appearance.   Skin: Warm, Dry, No erythema, No rash.   Extremities: Intact distal pulses, No cyanosis, No clubbing.   Musculoskeletal: There is bruising and tenderness over the distal second metacarpal.  There is tenderness over the distal radius.  She has good range of motion of the hand and wrist joints with intact active and passive movement.  Neurologic: Alert & oriented x 3, Normal motor function, Normal sensory function, No focal deficits noted.     RADIOLOGY/PROCEDURES  DX-WRIST-COMPLETE 3+ LEFT   Final Result      No evidence of acute fracture or dislocation.      DX-HAND 3+ LEFT   Final Result      No evidence of acute fracture or dislocation.            COURSE & MEDICAL DECISION MAKING  Pertinent Labs & Imaging studies reviewed. (See chart for details)  There is no sign of fracture on her initial x-rays.  Patient will be placed in a Velcro splint.    FINAL IMPRESSION  1.  Wrist sprain  2.  Hand contusion  3.        Electronically signed by: Jesus Velez, 11/17/2018 1:04 PM

## 2018-11-17 NOTE — ED NOTES
Assisting with d/c only  D/c pt home, rx given . Pt aware of f/u instructions , aware to return for any changes or concerns. No further questions upon d/c home from ed

## 2018-11-17 NOTE — ED TRIAGE NOTES
Presents complaining of left wrist/hand injury.  As state,  she slammed her extremity with her car's door yesterday.

## 2018-11-17 NOTE — ED NOTES
ERP at bedside. Pt agrees with plan of care discussed by ERP. AIDET acknowledged with patient. Dilip in low position, side rail up for pt safety. Call light within reach. X-ray done. Will continue to monitor.

## 2018-11-18 NOTE — ED NOTES
Pt given written and oral discharge instructions. Pt verbalized understanding of all instructions given. All questions answered. VSS. Pt given paper prescription as ordered and educated on use. Pt signed controlled substance informed consent form. Pt given f/u instructions and educated on s/s of when to return to the ER. Pt ambulating independently upon time of discharge in stable condition.

## 2018-11-18 NOTE — ED NOTES
"Pt was evaluated in our ED yesterday, and earlier this AM for a  complaint of right hand and wrist injury after slamming her extremity in a car door last night.  She reports increasing \"throbbing\" pain.  A brace is in place.   "

## 2018-11-24 NOTE — ED PROVIDER NOTES
"ED Provider Note    CHIEF COMPLAINT   Chief Complaint   Patient presents with   • Wrist Pain       HPI   Nichol Chaney is a 28 y.o. female who presents with throbbing wrist pain, evaluated earlier today.  Patient had negative x-rays, placed in a splint.  She states pain was poorly controlled today and is requesting medication.  No fever or chills.  Review of  database does not show history of narcotic abuse.  No other new complaints    REVIEW OF SYSTEMS   Musculoskeletal: Left wrist pain  Neurologic: No headache  Skin: No laceration      PAST MEDICAL HISTORY   Past Medical History:   Diagnosis Date   • DVT (deep venous thrombosis) (HCC)        FAMILY HISTORY  Family History   Problem Relation Age of Onset   • No Known Problems Mother    • No Known Problems Father    • No Known Problems Sister    • No Known Problems Brother    • Breast Cancer Maternal Grandmother    • Stroke Maternal Grandmother    • Cancer Maternal Grandfather         squamous cell cancer of oral cavity       SOCIAL HISTORY  Social History     Social History   • Marital status:      Spouse name: N/A   • Number of children: N/A   • Years of education: N/A     Social History Main Topics   • Smoking status: Former Smoker     Packs/day: 0.25     Years: 13.00     Types: Cigarettes     Start date: 10/15/2018   • Smokeless tobacco: Never Used      Comment: 1/2 ppd   • Alcohol use No   • Drug use: No   • Sexual activity: Yes     Partners: Male     Other Topics Concern   • Not on file     Social History Narrative   • No narrative on file       SURGICAL HISTORY  History reviewed. No pertinent surgical history.    CURRENT MEDICATIONS   Home Medications    **Home medications have not yet been reviewed for this encounter**         ALLERGIES   No Known Allergies    PHYSICAL EXAM  VITAL SIGNS: /90   Pulse 84   Temp 37.4 °C (99.3 °F) (Temporal)   Resp 16   Ht 1.702 m (5' 7\")   Wt 53 kg (116 lb 13.5 oz)   LMP 11/17/2018   SpO2 96%   BMI " 18.30 kg/m²   Skin: Swelling left lateral wrist.  No laceration.   Vascular: Intact distal capillary refill.   Musculoskeletal: Tender left wrist.  No deformity.  Left elbow nontender  Neurologic: Sensation intact left hand    RADIOLOGY/PROCEDURES  X-rays obtained earlier in the day were negative for fracture    COURSE & MEDICAL DECISION MAKING  Pertinent Labs & Imaging studies reviewed. (See chart for details)  Patient provided pain medication, prescription.  She is advised to follow-up with orthopedics as recommended earlier.  She is discharged in stable condition    FINAL IMPRESSION     1. Injury of left wrist, initial encounter    2. Contusion of left hand, initial encounter              Electronically signed by: Tomer House, 11/24/2018 12:24 AM

## 2019-01-02 ENCOUNTER — HOSPITAL ENCOUNTER (EMERGENCY)
Facility: MEDICAL CENTER | Age: 29
End: 2019-01-02
Attending: EMERGENCY MEDICINE
Payer: MEDICAID

## 2019-01-02 ENCOUNTER — APPOINTMENT (OUTPATIENT)
Dept: RADIOLOGY | Facility: MEDICAL CENTER | Age: 29
End: 2019-01-02
Attending: EMERGENCY MEDICINE
Payer: MEDICAID

## 2019-01-02 VITALS
SYSTOLIC BLOOD PRESSURE: 118 MMHG | DIASTOLIC BLOOD PRESSURE: 61 MMHG | HEART RATE: 80 BPM | OXYGEN SATURATION: 97 % | TEMPERATURE: 97.9 F | RESPIRATION RATE: 16 BRPM | BODY MASS INDEX: 17.14 KG/M2 | HEIGHT: 68 IN | WEIGHT: 113.1 LBS

## 2019-01-02 DIAGNOSIS — S92.415A CLOSED NONDISPLACED FRACTURE OF PROXIMAL PHALANX OF LEFT GREAT TOE, INITIAL ENCOUNTER: ICD-10-CM

## 2019-01-02 PROCEDURE — 99284 EMERGENCY DEPT VISIT MOD MDM: CPT

## 2019-01-02 PROCEDURE — 73660 X-RAY EXAM OF TOE(S): CPT | Mod: LT

## 2019-01-02 RX ORDER — HYDROCODONE BITARTRATE AND ACETAMINOPHEN 5; 325 MG/1; MG/1
1 TABLET ORAL EVERY 6 HOURS PRN
Qty: 12 TAB | Refills: 0 | Status: SHIPPED | OUTPATIENT
Start: 2019-01-02 | End: 2019-01-05

## 2019-01-02 ASSESSMENT — PAIN SCALES - GENERAL: PAINLEVEL_OUTOF10: 10

## 2019-01-02 NOTE — ED PROVIDER NOTES
"ED Provider Note    CHIEF COMPLAINT  Chief Complaint   Patient presents with   • Toe Injury     L great toe pain after kicking garbage can in kitchen last night       HPI  Nichol Chaney is a 28 y.o. female who presents Left great toe pain after stubbing it on a piece of furniture last night.  Has had swelling to the great toe and difficulty with ambulating with severe pains since then.  Denies significant ankle or knee pain.  No open wounds.    REVIEW OF SYSTEMS  See HPI for further details. All other systems are negative.     PAST MEDICAL HISTORY   has a past medical history of DVT (deep venous thrombosis) ([facility]).    SOCIAL HISTORY  Social History     Social History Main Topics   • Smoking status: Current Every Day Smoker     Packs/day: 0.25     Years: 13.00     Types: Cigarettes     Start date: 10/15/2018   • Smokeless tobacco: Never Used      Comment: 1/2 ppd   • Alcohol use Yes      Comment: very occasional   • Drug use: No   • Sexual activity: Yes     Partners: Male       SURGICAL HISTORY  patient denies any surgical history    CURRENT MEDICATIONS  Home Medications    **Home medications have not yet been reviewed for this encounter**         ALLERGIES  No Known Allergies    PHYSICAL EXAM  VITAL SIGNS: /45   Pulse 89   Temp 36.4 °C (97.6 °F) (Temporal)   Resp 16   Ht 1.727 m (5' 8\")   Wt 51.3 kg (113 lb 1.5 oz)   LMP 12/02/2018 (Approximate)   SpO2 98%   Breastfeeding? No   BMI 17.20 kg/m²   Pulse ox interpretation: I interpret this pulse ox as normal.  Constitutional: Alert in no apparent distress.  HENT: No signs of trauma, Bilateral external ears normal, Nose normal.   Cardiovascular: Regular rate and rhythm.   Thorax & Lungs: No respiratory distress.   Skin: Warm, Dry, No erythema, No rash.   Extremities: Intact distal pulses, focal edema around the left first toe, no ecchymosis, brisk capillary refill, diffuse tenderness to the left first toe, normal range of motion to the ankle without " swelling.  Musculoskeletal: Good range of motion in all major joints. No major deformities noted.       DIAGNOSTIC STUDIES / PROCEDURES    RADIOLOGY  DX-TOE(S) 2+ LEFT   Final Result      Comminuted acute fracture of the distal half of the proximal phalanx of the first digit is identified.          COURSE & MEDICAL DECISION MAKING  Pertinent Labs & Imaging studies reviewed. (See chart for details)  28 y.o. female presenting with left great toe pain following stubbing her toe on a piece of furniture last night.  Had difficulty sleeping secondary to the severe pain.  Has swelling to the toe however no open wounds or ecchymosis.  X-ray was performed showing comminuted acute fracture of the distal half of the proximal phalanx of the left first toe.  She was placed in a postop shoe and provided with crutches.  To be weightbearing as tolerated however instructed not to bend her toe.  To follow-up with orthopedic surgery for further management.  Pt was provided with oral analgesia for her pain symptoms that have been uncontrolled with over-the-counter preparations.    Instructed to follow-up with orthopedic surgery for further management.  To return immediately for any worsening of her symptoms or development of any other concerning signs or symptoms.    In prescribing controlled substances to this patient, I certify that I have obtained and reviewed the medical history of Nichol Chaney. I have also made a good mariah effort to obtain applicable records from other providers who have treated the patient and records did not demonstrate any increased risk of substance abuse that would prevent me from prescribing controlled substances.     I have conducted a physical exam and documented it. I have reviewed Ms. Chaney’s prescription history as maintained by the [place] Prescription Monitoring Program.     I have assessed the patient’s risk for abuse, dependency, and addiction using the validated Opioid Risk Tool available at  "https://www.mdcalc.com/umoeig-rrwb-ulyf-ort-narcotic-abuse.     Given the above, I believe the benefits of controlled substance therapy outweigh the risks. The reasons for prescribing controlled substances include non-narcotic, oral analgesic alternatives have been inadequate for pain control. Accordingly, I have discussed the risk and benefits, treatment plan, and alternative therapies with the patient.       The patient will not drink alcohol nor drive with prescribed medications.   The patient will return for worsening symptoms or failure of improvement and is stable at the time of discharge. The patient verbalizes understanding in their own words.    /61   Pulse 80   Temp 36.6 °C (97.9 °F)   Resp 16   Ht 1.727 m (5' 8\")   Wt 51.3 kg (113 lb 1.5 oz)   LMP 12/02/2018 (Approximate)   SpO2 97%   Breastfeeding? No   BMI 17.20 kg/m²     Vic Nguyen M.D.  [address]  [address]  [facility] [id number] [phone]    Schedule an appointment as soon as possible for a visit       [facility], Emergency Dept  [address]  [facility] [id number] [phone]    As needed, If symptoms worsen    Pcp Pt States None            FINAL IMPRESSION  1. Closed nondisplaced fracture of proximal phalanx of left great toe, initial encounter            Electronically signed by: Tomer Brantley, 1/2/2019 3:55 PM    "

## 2019-01-03 NOTE — ED NOTES
Pt given written and oral discharge instructions. Pt verbalized understanding of all instructions given. All questions answered. VSS. Pt given paper prescription and educated on use. Pt signed controlled substance informed consent form. Pt given f/u instructions and educated on s/s of when to return to the ER. Pt ambulating with crutches independently upon time of discharge in stable condition.

## 2019-02-27 ENCOUNTER — OFFICE VISIT (OUTPATIENT)
Dept: URGENT CARE | Facility: CLINIC | Age: 29
End: 2019-02-27
Payer: MEDICAID

## 2019-02-27 VITALS
SYSTOLIC BLOOD PRESSURE: 114 MMHG | HEART RATE: 85 BPM | BODY MASS INDEX: 17.13 KG/M2 | HEIGHT: 68 IN | WEIGHT: 113 LBS | DIASTOLIC BLOOD PRESSURE: 62 MMHG | RESPIRATION RATE: 16 BRPM | TEMPERATURE: 98.5 F | OXYGEN SATURATION: 99 %

## 2019-02-27 DIAGNOSIS — S00.83XA CONTUSION OF FOREHEAD, INITIAL ENCOUNTER: ICD-10-CM

## 2019-02-27 PROCEDURE — 99203 OFFICE O/P NEW LOW 30 MIN: CPT | Performed by: PHYSICIAN ASSISTANT

## 2019-02-27 ASSESSMENT — ENCOUNTER SYMPTOMS
MYALGIAS: 0
SPEECH CHANGE: 0
TREMORS: 0
WEAKNESS: 0
EYES NEGATIVE: 1
CONSTITUTIONAL NEGATIVE: 1
SEIZURES: 0
GASTROINTESTINAL NEGATIVE: 1
MEMORY LOSS: 0
BLURRED VISION: 0
SENSORY CHANGE: 0
HEADACHES: 1
RESPIRATORY NEGATIVE: 1
NECK PAIN: 0
NUMBNESS: 0
CARDIOVASCULAR NEGATIVE: 1
FALLS: 1
VOMITING: 0
DISORIENTATION: 0
NAUSEA: 0
DIZZINESS: 0
FOCAL WEAKNESS: 0
TINGLING: 0
BACK PAIN: 0

## 2019-02-27 NOTE — LETTER
February 27, 2019         Patient: Nichol Chaney   YOB: 1990   Date of Visit: 2/27/2019           To Whom it May Concern:    Nichol Chaney was seen in my clinic on 2/27/2019. She may return to work immediately without restrictions.    If you have any questions or concerns, please don't hesitate to call.        Sincerely,           Ap Goncalves P.A.-C.  Electronically Signed

## 2019-06-14 ENCOUNTER — APPOINTMENT (OUTPATIENT)
Dept: RADIOLOGY | Facility: MEDICAL CENTER | Age: 29
End: 2019-06-14
Attending: EMERGENCY MEDICINE
Payer: MEDICAID

## 2019-06-14 ENCOUNTER — HOSPITAL ENCOUNTER (EMERGENCY)
Facility: MEDICAL CENTER | Age: 29
End: 2019-06-14
Attending: EMERGENCY MEDICINE
Payer: MEDICAID

## 2019-06-14 VITALS
RESPIRATION RATE: 21 BRPM | WEIGHT: 118.17 LBS | HEART RATE: 105 BPM | TEMPERATURE: 97.7 F | HEIGHT: 67 IN | OXYGEN SATURATION: 96 % | DIASTOLIC BLOOD PRESSURE: 85 MMHG | SYSTOLIC BLOOD PRESSURE: 139 MMHG | BODY MASS INDEX: 18.55 KG/M2

## 2019-06-14 DIAGNOSIS — M79.605 PAIN OF LEFT LOWER EXTREMITY: ICD-10-CM

## 2019-06-14 DIAGNOSIS — R06.02 SHORTNESS OF BREATH: ICD-10-CM

## 2019-06-14 LAB
APPEARANCE UR: CLEAR
BACTERIA #/AREA URNS HPF: ABNORMAL /HPF
BASOPHILS # BLD AUTO: 0.7 % (ref 0–1.8)
BASOPHILS # BLD: 0.05 K/UL (ref 0–0.12)
BILIRUB UR QL STRIP.AUTO: NEGATIVE
BNP SERPL-MCNC: 42 PG/ML (ref 0–100)
COLOR UR: YELLOW
EKG IMPRESSION: NORMAL
EOSINOPHIL # BLD AUTO: 0.07 K/UL (ref 0–0.51)
EOSINOPHIL NFR BLD: 0.9 % (ref 0–6.9)
EPI CELLS #/AREA URNS HPF: ABNORMAL /HPF
ERYTHROCYTE [DISTWIDTH] IN BLOOD BY AUTOMATED COUNT: 55.4 FL (ref 35.9–50)
GLUCOSE UR STRIP.AUTO-MCNC: NEGATIVE MG/DL
HCT VFR BLD AUTO: 43 % (ref 37–47)
HGB BLD-MCNC: 14.4 G/DL (ref 12–16)
IMM GRANULOCYTES # BLD AUTO: 0 K/UL (ref 0–0.11)
IMM GRANULOCYTES NFR BLD AUTO: 0 % (ref 0–0.9)
KETONES UR STRIP.AUTO-MCNC: NEGATIVE MG/DL
LEUKOCYTE ESTERASE UR QL STRIP.AUTO: ABNORMAL
LYMPHOCYTES # BLD AUTO: 3.6 K/UL (ref 1–4.8)
LYMPHOCYTES NFR BLD: 46.8 % (ref 22–41)
MCH RBC QN AUTO: 32.7 PG (ref 27–33)
MCHC RBC AUTO-ENTMCNC: 33.5 G/DL (ref 33.6–35)
MCV RBC AUTO: 97.7 FL (ref 81.4–97.8)
MICRO URNS: ABNORMAL
MONOCYTES # BLD AUTO: 0.83 K/UL (ref 0–0.85)
MONOCYTES NFR BLD AUTO: 10.8 % (ref 0–13.4)
MUCOUS THREADS #/AREA URNS HPF: ABNORMAL /HPF
NEUTROPHILS # BLD AUTO: 3.14 K/UL (ref 2–7.15)
NEUTROPHILS NFR BLD: 40.8 % (ref 44–72)
NITRITE UR QL STRIP.AUTO: NEGATIVE
NRBC # BLD AUTO: 0 K/UL
NRBC BLD-RTO: 0 /100 WBC
PH UR STRIP.AUTO: 6 [PH]
PLATELET # BLD AUTO: 151 K/UL (ref 164–446)
PMV BLD AUTO: 9.8 FL (ref 9–12.9)
PROT UR QL STRIP: NEGATIVE MG/DL
RBC # BLD AUTO: 4.4 M/UL (ref 4.2–5.4)
RBC UR QL AUTO: NEGATIVE
SP GR UR STRIP.AUTO: <=1.005
WBC # BLD AUTO: 7.7 K/UL (ref 4.8–10.8)
WBC #/AREA URNS HPF: ABNORMAL /HPF

## 2019-06-14 PROCEDURE — 93005 ELECTROCARDIOGRAM TRACING: CPT | Performed by: EMERGENCY MEDICINE

## 2019-06-14 PROCEDURE — 83880 ASSAY OF NATRIURETIC PEPTIDE: CPT

## 2019-06-14 PROCEDURE — 99284 EMERGENCY DEPT VISIT MOD MDM: CPT

## 2019-06-14 PROCEDURE — 71275 CT ANGIOGRAPHY CHEST: CPT

## 2019-06-14 PROCEDURE — 81001 URINALYSIS AUTO W/SCOPE: CPT

## 2019-06-14 PROCEDURE — 85025 COMPLETE CBC W/AUTO DIFF WBC: CPT

## 2019-06-14 PROCEDURE — 36415 COLL VENOUS BLD VENIPUNCTURE: CPT

## 2019-06-14 PROCEDURE — 700117 HCHG RX CONTRAST REV CODE 255: Performed by: EMERGENCY MEDICINE

## 2019-06-14 RX ORDER — KETOROLAC TROMETHAMINE 30 MG/ML
15 INJECTION, SOLUTION INTRAMUSCULAR; INTRAVENOUS ONCE
Status: DISCONTINUED | OUTPATIENT
Start: 2019-06-14 | End: 2019-06-14 | Stop reason: HOSPADM

## 2019-06-14 RX ADMIN — IOHEXOL 51 ML: 350 INJECTION, SOLUTION INTRAVENOUS at 22:43

## 2019-06-14 ASSESSMENT — PAIN DESCRIPTION - DESCRIPTORS: DESCRIPTORS: ACHING

## 2019-06-15 NOTE — ED NOTES
Dr. Amanda asked for f/u this am to the pt to report the findings on the CT of the area on the CT.  The pt was contacted by phone at [phone] and inst  for f/u the pt needs to 1.  F/u op w/ pcp or 2.  Return to the ED to discuss the findings w/ the ERP on duty.  The pt was also inst that she needed to follow through w/ a new US of the LLE in the ED or w/ pcp.  The pt verb understanding and stated that she would return to the ed today.

## 2019-06-15 NOTE — ED NOTES
"RN walking back to ER and finds pt leaving through double doors. Pt states that someone told her she could leave to the lobby to get her sister. RN verified that no staff member had told her this. RN out to lobby to see if pt was there. RN found pt in parking lot calling for a ride. Pt told that she needed to come back and have her IV removed prior to leaving otherwise we would need to call [employer]. Pt talking on phone states \"oh they are going to call [employer] on me if I leave.\" Pt back to triage offered to go back to room but pt refused. PIV removed and pt stormed out of ED. ERP notified. All belongings with pt when she left the ED initially.   "

## 2019-06-15 NOTE — ED PROVIDER NOTES
ED Provider Note    Chief Complaint:   Leg pain, shortness of breath    HPI:  Nichol Chaney is a 28 y.o. female who presents with chief complaint of left leg pain and swelling, as well as shortness of breath.  Leg pain began about 3 days ago.  She reports a prior history of DVT, diagnosed approximately 1 year ago.  She was on Xarelto initially, DVT was unprovoked, because of the DVT was never discovered.  Her primary care physician discontinued her Xarelto, she is no longer anticoagulated.  She recently began a job that requires her to stand all day, about 3 days ago she developed worsening pain and swelling to the left leg, pain is localized to the posterior calf and posterior thigh.  She states her symptoms are similar to when she was previously diagnosed with a DVT.    On review of systems, she states that she has had some progressively worsening shortness of breath over the last 3 to 4 days as well.  She states she may have had mild shortness of breath over the last few months, but did not notice that worsen.  She has not had any fevers, no chest pain, she does have some intermittent muscular shoulder pain.  She is had no nausea, no vomiting.  No abdominal pain or distention.  No abnormal rashes or lesions, no abnormal bleeding or bruising.    Review of Systems:  See HPI for pertinent positives and negatives. All other systems negative.    Past Medical History:   has a past medical history of DVT (deep venous thrombosis) ([facility]).    Social History:  Social History     Social History Main Topics   • Smoking status: Current Every Day Smoker     Packs/day: 0.25     Years: 13.00     Types: Cigarettes     Start date: 10/15/2018   • Smokeless tobacco: Never Used      Comment: 1/2 ppd   • Alcohol use Yes      Comment: very occasional   • Drug use: No   • Sexual activity: Yes     Partners: Male       Surgical History:  patient denies any surgical history    Current Medications:  Home Medications     Reviewed by Daria  "CURTIS Connolly R.N. (Registered Nurse) on 06/14/19 at 2041  Med List Status: Not Addressed   Medication Last Dose Status   RIVAROXABAN PO  Active                Allergies:  No Known Allergies    Physical Exam:  Vital Signs: /85   Pulse (!) 105   Temp 36.5 °C (97.7 °F) (Temporal)   Resp (!) 21   Ht 1.702 m (5' 7\")   Wt 53.6 kg (118 lb 2.7 oz)   LMP 05/14/2019   SpO2 96%   Breastfeeding? No   BMI 18.51 kg/m²   Constitutional: Alert, no acute distress  HENT: Moist mucus membranes, normal posterior pharynx, no intraoral lesions  Eyes: Pupils equal and reactive, normal conjunctiva  Neck: Supple, normal range of motion, no stridor  Cardiovascular: Extremities are warm and well perfused, no murmur appreciated, normal cardiac auscultation  Pulmonary: No respiratory distress, normal work of breathing, no accessory muscule usage, breath sounds clear and equal bilaterally, no wheezing, no coarse breath sounds  Abdomen: Soft, non-distended, non-tender to palpation, no peritoneal signs  Skin: Warm, dry, no rashes or lesions  Musculoskeletal: Lower extremities appear symmetric, right lower extremity is entirely nontender to palpation, left lower extremity with tenderness to palpation along the posterior thigh and posterior calf, 2+ DP pulse, soft compartments  Neurologic: Alert, oriented, normal speech, normal motor function  Psychiatric: Normal and appropriate mood and affect    Medical records reviewed for continuity of care.  No recent visits for similar symptoms.  Patient was seen at urgent care 2/27/2019     EKG: Rate 77, normal sinus rhythm, no ST elevation or depression, T wave inversions present in aVR and V1, normal voltage.  T wave inversions are present on prior EKG.    Labs:  Labs Reviewed   URINALYSIS,CULTURE IF INDICATED - Abnormal; Notable for the following:        Result Value    Leukocyte Esterase Trace (*)     All other components within normal limits   CBC WITH DIFFERENTIAL - Abnormal; Notable for " the following:     MCHC 33.5 (*)     RDW 55.4 (*)     Platelet Count 151 (*)     Neutrophils-Polys 40.80 (*)     Lymphocytes 46.80 (*)     All other components within normal limits   URINE MICROSCOPIC (W/UA) - Abnormal; Notable for the following:     Bacteria Moderate (*)     Epithelial Cells Moderate (*)     All other components within normal limits   BTYPE NATRIURETIC PEPTIDE   COMP METABOLIC PANEL   TROPONIN   HCG QUAL SERUM       Radiology:  CT-CTA CHEST PULMONARY ARTERY W/ RECONS   Final Result         1. No CT evidence of pulmonary embolism.      2. No airspace opacity. No pleural effusion. No pneumothorax.      3. There is an irregular low-attenuation area in the anterior right hepatic lobe, measuring up to 3.2 cm. This is not definitely seen on prior exams. This is incompletely evaluated on this CT but may be seen with hemangioma or atypical fatty    infiltration.  Further evaluation with liver MRI is recommended.             ED Medications Administered:  Medications   iohexol (OMNIPAQUE) 350 mg/mL (51 mL Intravenous Given 6/14/19 2243)       Differential diagnosis:  DVT, pulmonary embolism, musculoskeletal pain, dependent edema, costochondritis, pneumonia, fluid overload    MDM:  History and physical exam as documented above.  Patient presents with left leg pain and shortness of breath.  Given her history of DVT there is concern for recurrent thromboembolism.  Also of concern, on my discussion with the patient she states that she does still have some Xarelto remaining.  She says that she does get intermittent leg pain and cramps that feel similar to her prior DVTs, when this occurs she takes her Xarelto for 3 or 4 days and then stops.  I counseled her extensively on the hypercoagulable state that occurs immediately after discontinuation of Xarelto, counseled her that this is not a medication that should be started and stopped frequently, and that she should not take this medication without physician  monitoring.  Additionally counseled her to return to the emergency department if she develops any symptoms concerning for DVT.    Labs resulted after patient eloped.  Urinalysis does have moderate bacteria and epithelial cells, likely contamination.  CBC is reassuring.  BNP is within normal limits.  CMP did not result.    CTA demonstrates no evidence of pulmonary embolism.  Incidental finding of irregular low-attenuation area in the anterior right hepatic lobe noted.    Prior to lab results and imaging results, it was noted that the patient was no longer in her room.  She had told nursing staff that someone told her it was okay for her to walk outside.  Nursing staff found her in the parking lot calling for her ride.  Patient stated that she no longer was going to wait, refused to return back to the emergency department.  Declined to wait for discussion with me to review AMA risks and benefits as well as an alternative plan for further evaluation.  Security was called as the patient attempted to elope with her IV.  IV was removed by nursing staff before she left the property.    We will place a call in the morning regarding the incidental finding on her CT, additionally will encourage her to return to the emergency department to continue her evaluation.    Disposition:  Eloped in guarded condition.    Final Impression:  1. Pain of left lower extremity    2. Shortness of breath      Electronically signed by: Sarah Amanda, 6/15/2019 12:05 AM

## 2019-06-15 NOTE — ED NOTES
Pt tearful and states she is really thirsty and requesting water. Pt made aware we will have to water for MD to see her first.

## 2019-06-15 NOTE — ED TRIAGE NOTES
"Chief Complaint   Patient presents with   • Leg Pain     \" my leg hurts, my whole leg\" .  pt comlpains of left leg pain      Pt states she started noticing her left leg was getting swollen and complains of left leg pain. Pt states she has a history of DVT about an year ago. Pt states she also was prescribed xarelto and was instructed to take as needed if she felt she was getting a blood clot.  No redness noted, CMS intact.   "

## 2019-06-17 ENCOUNTER — PATIENT OUTREACH (OUTPATIENT)
Dept: HEALTH INFORMATION MANAGEMENT | Facility: OTHER | Age: 29
End: 2019-06-17

## 2019-06-26 ENCOUNTER — OFFICE VISIT (OUTPATIENT)
Dept: MEDICAL GROUP | Facility: MEDICAL CENTER | Age: 29
End: 2019-06-26
Attending: INTERNAL MEDICINE
Payer: MEDICAID

## 2019-06-26 VITALS
OXYGEN SATURATION: 98 % | DIASTOLIC BLOOD PRESSURE: 78 MMHG | BODY MASS INDEX: 17.58 KG/M2 | WEIGHT: 112 LBS | RESPIRATION RATE: 16 BRPM | SYSTOLIC BLOOD PRESSURE: 110 MMHG | HEART RATE: 84 BPM | HEIGHT: 67 IN | TEMPERATURE: 98.1 F

## 2019-06-26 DIAGNOSIS — Z72.0 TOBACCO USE: ICD-10-CM

## 2019-06-26 DIAGNOSIS — K76.9 LIVER LESION: ICD-10-CM

## 2019-06-26 DIAGNOSIS — Z86.718 HISTORY OF DVT (DEEP VEIN THROMBOSIS): ICD-10-CM

## 2019-06-26 DIAGNOSIS — F40.240 CLAUSTROPHOBIA: ICD-10-CM

## 2019-06-26 PROBLEM — L81.4 LENTIGO: Status: RESOLVED | Noted: 2018-02-13 | Resolved: 2019-06-26

## 2019-06-26 PROBLEM — L63.9 ALOPECIA AREATA: Status: RESOLVED | Noted: 2018-02-13 | Resolved: 2019-06-26

## 2019-06-26 PROCEDURE — 99203 OFFICE O/P NEW LOW 30 MIN: CPT | Performed by: INTERNAL MEDICINE

## 2019-06-26 PROCEDURE — 99204 OFFICE O/P NEW MOD 45 MIN: CPT | Performed by: INTERNAL MEDICINE

## 2019-06-26 RX ORDER — DIAZEPAM 5 MG/1
5 TABLET ORAL ONCE
Qty: 1 TAB | Refills: 0 | Status: SHIPPED | OUTPATIENT
Start: 2019-06-26 | End: 2019-06-26

## 2019-06-26 ASSESSMENT — PATIENT HEALTH QUESTIONNAIRE - PHQ9: CLINICAL INTERPRETATION OF PHQ2 SCORE: 0

## 2019-06-26 ASSESSMENT — PAIN SCALES - GENERAL: PAINLEVEL: NO PAIN

## 2019-06-26 NOTE — ASSESSMENT & PLAN NOTE
She was recently seen in the emergency room leg swelling.  There was concern that she could have a DVT and she was having some shortness of breath which led to a CT chest.  There was no evidence of a PE however they did see a nonspecific liver lesion and recommended more imaging with MRI of liver.  She denies any history of liver problems, abdominal pain, abdominal swelling, jaundice, history of hepatitis.

## 2019-06-26 NOTE — PROGRESS NOTES
Nichol Chaney is a 29 y.o. female here for follow-up liver lesion that was seen on recent imaging in the ER, history of DVT, establish care  HPI:    History of DVT (deep vein thrombosis)  She reports a history of a DVT in 2018 which was considered unprovoked.  She had extensive testing done for all hypercoagulable diseases including factor V Leiden, protein S, protein C, lupus anticoagulant, Antithrombin III, cardiolipin and B2GPI antibodies.  Everything came back normal.  She was on rivaroxaban for appropriate duration and then transitioned to aspirin 81 mg daily.  She is not currently taking the aspirin.    Liver lesion  She was recently seen in the emergency room leg swelling.  There was concern that she could have a DVT and she was having some shortness of breath which led to a CT chest.  There was no evidence of a PE however they did see a nonspecific liver lesion and recommended more imaging with MRI of liver.  She denies any history of liver problems, abdominal pain, abdominal swelling, jaundice, history of hepatitis.    Tobacco use  Currently smoking about 5 cigarettes/day.  Reports she is going through a lot of stress and she is not interested in quitting at this time.    Current medicines (including changes today)  Current Outpatient Prescriptions   Medication Sig Dispense Refill   • diazePAM (VALIUM) 5 MG Tab Take 1 Tab by mouth Once for 1 dose. 1 Tab 0     No current facility-administered medications for this visit.      She  has a past medical history of History of DVT (deep vein thrombosis) and Tobacco use.  She  has no past surgical history on file.  Social History   Substance Use Topics   • Smoking status: Current Every Day Smoker     Packs/day: 0.25     Years: 10.00     Types: Cigarettes     Start date: 10/15/2018   • Smokeless tobacco: Never Used   • Alcohol use 3.6 oz/week     6 Cans of beer per week     Social History     Social History Narrative   • No narrative on file     Family History    Problem Relation Age of Onset   • Cancer Mother         melanoma   • No Known Problems Father    • No Known Problems Sister    • No Known Problems Brother    • Stroke Maternal Grandmother    • Cancer Maternal Grandmother         breast   • Cancer Maternal Grandfather         squamous cell cancer of throat   • Diabetes Neg Hx    • Heart Disease Neg Hx    • Hyperlipidemia Neg Hx    • Hypertension Neg Hx    • Blood Clots Neg Hx          ROS  As above in HPI  All other systems reviewed and are negative     Objective:     Vitals:    06/26/19 1402   BP: 110/78   Pulse: 84   Resp: 16   Temp: 36.7 °C (98.1 °F)   SpO2: 98%     Body mass index is 17.54 kg/m².  Physical Exam:    Constitutional: Alert, no distress.  Skin: Warm, dry, good turgor, no rashes in visible areas.  Eye: Equal, round and reactive, conjunctiva clear, lids normal.  ENMT: Lips without lesions, good dentition, oropharynx clear, TM's clear bilaterally.  Neck: Trachea midline, no masses, no thyromegaly. No cervical or supraclavicular lymphadenopathy.  Respiratory: Unlabored respiratory effort, lungs clear to auscultation, no wheezes, no ronchi.  Cardiovascular: Regular rate and rhythm, no murmurs appreciated, no lower extremity edema.  Abdomen: Soft, scaphoid, non-tender, no masses, no hepatosplenomegaly.  Psych: Alert and oriented x3, normal affect and mood.    CT Chest (6/14/19)  Impression         1. No CT evidence of pulmonary embolism.    2. No airspace opacity. No pleural effusion. No pneumothorax.    3. There is an irregular low-attenuation area in the anterior right hepatic lobe, measuring up to 3.2 cm. This is not definitely seen on prior exams. This is incompletely evaluated on this CT but may be seen with hemangioma or atypical fatty   infiltration.  Further evaluation with liver MRI is recommended.         Assessment and Plan:   The following treatment plan was discussed    1. Liver lesion  Discussed with patient that the lesion represents an  incidental finding and is most likely either a hemangioma or atypical fatty infiltrate based on radiology read.  However, an MRI was recommended and I have ordered this today.  She does get significant claustrophobia associated with imaging studies so I have ordered 1 dose of Valium that she can take prior to the imaging.  - MR-ABDOMEN-WITH & W/O; Future    2. Claustrophobia  - diazePAM (VALIUM) 5 MG Tab; Take 1 Tab by mouth Once for 1 dose.  Dispense: 1 Tab; Refill: 0    3. History of DVT (deep vein thrombosis)  She had a completely normal hypercoagulability work-up.  We discussed avoiding estrogen-containing birth control.  We discussed quitting smoking to reduce her risk of blood clots in the future.    4. Tobacco use  She is not ready to quit at this time.  We will continue to monitor.    With regards to her health maintenance, she is up-to-date for her vaccines besides Pneumovax which she declines today.  She is due for Pap and she will call to schedule this.    Followup: Return if symptoms worsen or fail to improve, for PAP.

## 2019-06-26 NOTE — ASSESSMENT & PLAN NOTE
Currently smoking about 5 cigarettes/day.  Reports she is going through a lot of stress and she is not interested in quitting at this time.

## 2019-06-26 NOTE — ASSESSMENT & PLAN NOTE
She reports a history of a DVT in 2018 which was considered unprovoked.  She had extensive testing done for all hypercoagulable diseases including factor V Leiden, protein S, protein C, lupus anticoagulant, Antithrombin III, cardiolipin and B2GPI antibodies.  Everything came back normal.  She was on rivaroxaban for appropriate duration and then transitioned to aspirin 81 mg daily.  She is not currently taking the aspirin.

## 2020-01-22 ENCOUNTER — HOSPITAL ENCOUNTER (EMERGENCY)
Facility: MEDICAL CENTER | Age: 30
End: 2020-01-22
Attending: EMERGENCY MEDICINE

## 2020-01-22 VITALS
BODY MASS INDEX: 17.82 KG/M2 | WEIGHT: 113.54 LBS | HEIGHT: 67 IN | DIASTOLIC BLOOD PRESSURE: 102 MMHG | TEMPERATURE: 98.7 F | RESPIRATION RATE: 14 BRPM | OXYGEN SATURATION: 97 % | HEART RATE: 88 BPM | SYSTOLIC BLOOD PRESSURE: 132 MMHG

## 2020-01-22 DIAGNOSIS — J01.10 ACUTE FRONTAL SINUSITIS, RECURRENCE NOT SPECIFIED: ICD-10-CM

## 2020-01-22 PROCEDURE — 99283 EMERGENCY DEPT VISIT LOW MDM: CPT

## 2020-01-22 RX ORDER — AMOXICILLIN AND CLAVULANATE POTASSIUM 875; 125 MG/1; MG/1
1 TABLET, FILM COATED ORAL 2 TIMES DAILY
Qty: 14 TAB | Refills: 0 | Status: SHIPPED | OUTPATIENT
Start: 2020-01-22 | End: 2020-01-29

## 2020-01-22 RX ORDER — FLUTICASONE PROPIONATE 50 MCG
1 SPRAY, SUSPENSION (ML) NASAL DAILY
Qty: 1 BOTTLE | Refills: 0 | Status: SHIPPED | OUTPATIENT
Start: 2020-01-22 | End: 2020-01-22 | Stop reason: SDUPTHER

## 2020-01-22 RX ORDER — FLUTICASONE PROPIONATE 50 MCG
1 SPRAY, SUSPENSION (ML) NASAL DAILY
Qty: 1 BOTTLE | Refills: 0 | Status: SHIPPED | OUTPATIENT
Start: 2020-01-22 | End: 2021-12-07

## 2020-01-22 NOTE — ED TRIAGE NOTES
"Chief Complaint   Patient presents with   • Congestion     x 2 weeks   • Headache     x 1.5 weeks     /102   Pulse 88   Temp 37.1 °C (98.7 °F) (Temporal)   Resp 14   Ht 1.702 m (5' 7\")   Wt 51.5 kg (113 lb 8.6 oz)   LMP 01/15/2020   SpO2 97%   BMI 17.78 kg/m²     Reviewed Triage Process w/ pt.  Encouarged to notify RN of any changes in condition or concerns.  Pt to lobby.  "

## 2020-01-22 NOTE — ED PROVIDER NOTES
"ED Provider Note    CHIEF COMPLAINT  Chief Complaint   Patient presents with   • Congestion     x 2 weeks   • Headache     x 1.5 weeks       HPI  Nichol Chaney is a 29 y.o. female who presents with headache.  Patient developed a cold a couple weeks ago with cough, congestion, runny nose and headache.  She feels that her URI symptoms are getting better but she has persistent headache this is described as a pressure behind her eyes and over her frontal scalp.  It is worse when she bends over.  She is felt feverish but no documented temperature.  She has mild body aches.  No rash.  No neck stiffness.  No weakness numbness neurologic symptoms.    REVIEW OF SYSTEMS  Pertinent negative: As above    PAST MEDICAL HISTORY  Past Medical History:   Diagnosis Date   • History of DVT (deep vein thrombosis)    • Tobacco use        SOCIAL HISTORY  Social History     Tobacco Use   • Smoking status: Current Every Day Smoker     Packs/day: 0.25     Years: 10.00     Pack years: 2.50     Types: Cigarettes     Start date: 10/15/2018   • Smokeless tobacco: Never Used   Substance Use Topics   • Alcohol use: Yes     Alcohol/week: 3.6 oz     Types: 6 Cans of beer per week   • Drug use: No       SURGICAL HISTORY  History reviewed. No pertinent surgical history.    ALLERGIES  No Known Allergies    PHYSICAL EXAM  VITAL SIGNS: /102   Pulse 88   Temp 37.1 °C (98.7 °F) (Temporal)   Resp 14   Ht 1.702 m (5' 7\")   Wt 51.5 kg (113 lb 8.6 oz)   LMP 01/15/2020   SpO2 97%   BMI 17.78 kg/m²    Constitutional: Awake and alert. Nontoxic  HENT: Mild tenderness over the frontal sinus region.  No skin changes.  The left middle ear with an effusion and erythema.  Bilateral nares with mucosal edema of the turbinates worse on the left than on the right.  Pharynx is normal.  Eyes: Grossly normal  Neck: Normal range of motion, no meningismus, no lymphadenopathy  Cardiovascular: Normal heart rate   Thorax & Lungs: No respiratory distress, lung " fields are clear throughout without wheezes rales or rhonchi  Skin:  No pathologic rash.   Extremities: Well perfused  Psychiatric: Affect normal      COURSE & MEDICAL DECISION MAKING  Patient presents with history and physical consistent with sinusitis.  She has had symptoms of duration to merit antibiotic therapy.  She does not have any clinical suggestion of complication of sinusitis.  She is given a prescription for 7 days of Augmentin as well as Flonase nasal spray.  Given standard instructions on sinusitis.  I would like her to follow-up with her doctor next week.  Return to the ER for fevers, worsening, not improving or concern.    FINAL IMPRESSION  1.  Sinusitis    Disposition: home in good condition      This dictation was created using voice recognition software. The accuracy of the dictation is limited to the abilities of the software.  The nursing notes were reviewed and certain aspects of this information were incorporated into this note.      Electronically signed by: Michael Rodriguez M.D., 1/22/2020 10:05 AM

## 2020-03-05 LAB
ANION GAP SERPL CALC-SCNC: 10 MMOL/L (ref 7–16)
BASOPHILS # BLD AUTO: 0.5 % (ref 0–1.8)
BASOPHILS # BLD: 0.03 K/UL (ref 0–0.12)
BUN SERPL-MCNC: 4 MG/DL (ref 8–22)
CALCIUM SERPL-MCNC: 8.7 MG/DL (ref 8.4–10.2)
CHLORIDE SERPL-SCNC: 106 MMOL/L (ref 96–112)
CO2 SERPL-SCNC: 26 MMOL/L (ref 20–33)
CREAT SERPL-MCNC: 0.42 MG/DL (ref 0.5–1.4)
D DIMER PPP IA.FEU-MCNC: 0.44 UG/ML (FEU) (ref 0–0.5)
EOSINOPHIL # BLD AUTO: 0.05 K/UL (ref 0–0.51)
EOSINOPHIL NFR BLD: 0.9 % (ref 0–6.9)
ERYTHROCYTE [DISTWIDTH] IN BLOOD BY AUTOMATED COUNT: 49.5 FL (ref 35.9–50)
GLUCOSE SERPL-MCNC: 88 MG/DL (ref 65–99)
HCT VFR BLD AUTO: 39.4 % (ref 37–47)
HGB BLD-MCNC: 13.5 G/DL (ref 12–16)
IMM GRANULOCYTES # BLD AUTO: 0.01 K/UL (ref 0–0.11)
IMM GRANULOCYTES NFR BLD AUTO: 0.2 % (ref 0–0.9)
LYMPHOCYTES # BLD AUTO: 2.12 K/UL (ref 1–4.8)
LYMPHOCYTES NFR BLD: 38.1 % (ref 22–41)
MCH RBC QN AUTO: 34.8 PG (ref 27–33)
MCHC RBC AUTO-ENTMCNC: 34.3 G/DL (ref 33.6–35)
MCV RBC AUTO: 101.5 FL (ref 81.4–97.8)
MONOCYTES # BLD AUTO: 1 K/UL (ref 0–0.85)
MONOCYTES NFR BLD AUTO: 18 % (ref 0–13.4)
NEUTROPHILS # BLD AUTO: 2.35 K/UL (ref 2–7.15)
NEUTROPHILS NFR BLD: 42.3 % (ref 44–72)
NRBC # BLD AUTO: 0 K/UL
NRBC BLD-RTO: 0 /100 WBC
PLATELET # BLD AUTO: 163 K/UL (ref 164–446)
PMV BLD AUTO: 10.1 FL (ref 9–12.9)
POTASSIUM SERPL-SCNC: 2.8 MMOL/L (ref 3.6–5.5)
RBC # BLD AUTO: 3.88 M/UL (ref 4.2–5.4)
SODIUM SERPL-SCNC: 142 MMOL/L (ref 135–145)
WBC # BLD AUTO: 5.6 K/UL (ref 4.8–10.8)

## 2020-03-05 PROCEDURE — 84703 CHORIONIC GONADOTROPIN ASSAY: CPT

## 2020-03-05 PROCEDURE — 99284 EMERGENCY DEPT VISIT MOD MDM: CPT

## 2020-03-05 PROCEDURE — 96375 TX/PRO/DX INJ NEW DRUG ADDON: CPT

## 2020-03-05 PROCEDURE — 80048 BASIC METABOLIC PNL TOTAL CA: CPT

## 2020-03-05 PROCEDURE — 85025 COMPLETE CBC W/AUTO DIFF WBC: CPT

## 2020-03-05 PROCEDURE — 96365 THER/PROPH/DIAG IV INF INIT: CPT

## 2020-03-05 PROCEDURE — 85379 FIBRIN DEGRADATION QUANT: CPT

## 2020-03-05 PROCEDURE — 80076 HEPATIC FUNCTION PANEL: CPT

## 2020-03-05 ASSESSMENT — FIBROSIS 4 INDEX: FIB4 SCORE: 1.19

## 2020-03-06 ENCOUNTER — APPOINTMENT (OUTPATIENT)
Dept: RADIOLOGY | Facility: MEDICAL CENTER | Age: 30
End: 2020-03-06
Attending: EMERGENCY MEDICINE

## 2020-03-06 ENCOUNTER — HOSPITAL ENCOUNTER (EMERGENCY)
Facility: MEDICAL CENTER | Age: 30
End: 2020-03-06
Attending: EMERGENCY MEDICINE

## 2020-03-06 VITALS
SYSTOLIC BLOOD PRESSURE: 133 MMHG | RESPIRATION RATE: 18 BRPM | BODY MASS INDEX: 18.13 KG/M2 | HEART RATE: 65 BPM | WEIGHT: 115.52 LBS | HEIGHT: 67 IN | TEMPERATURE: 97.8 F | DIASTOLIC BLOOD PRESSURE: 87 MMHG | OXYGEN SATURATION: 99 %

## 2020-03-06 DIAGNOSIS — Z86.718 HISTORY OF DVT (DEEP VEIN THROMBOSIS): ICD-10-CM

## 2020-03-06 DIAGNOSIS — E87.6 HYPOKALEMIA: ICD-10-CM

## 2020-03-06 DIAGNOSIS — R20.2 PARESTHESIA: ICD-10-CM

## 2020-03-06 DIAGNOSIS — M79.605 LEFT LEG PAIN: ICD-10-CM

## 2020-03-06 DIAGNOSIS — E86.0 DEHYDRATION: ICD-10-CM

## 2020-03-06 DIAGNOSIS — M79.89 SWELLING OF LOWER LEG: ICD-10-CM

## 2020-03-06 LAB
ALBUMIN SERPL BCP-MCNC: 3.9 G/DL (ref 3.2–4.9)
ALP SERPL-CCNC: 116 U/L (ref 30–99)
ALT SERPL-CCNC: 58 U/L (ref 2–50)
AST SERPL-CCNC: 71 U/L (ref 12–45)
BILIRUB CONJ SERPL-MCNC: 0.3 MG/DL (ref 0.1–0.5)
BILIRUB INDIRECT SERPL-MCNC: 0.2 MG/DL (ref 0–1)
BILIRUB SERPL-MCNC: 0.5 MG/DL (ref 0.1–1.5)
HCG SERPL QL: NEGATIVE
PROT SERPL-MCNC: 6.5 G/DL (ref 6–8.2)

## 2020-03-06 PROCEDURE — 700111 HCHG RX REV CODE 636 W/ 250 OVERRIDE (IP): Performed by: EMERGENCY MEDICINE

## 2020-03-06 PROCEDURE — 700101 HCHG RX REV CODE 250: Performed by: EMERGENCY MEDICINE

## 2020-03-06 PROCEDURE — 93970 EXTREMITY STUDY: CPT

## 2020-03-06 RX ORDER — POTASSIUM CHLORIDE 7.45 MG/ML
10 INJECTION INTRAVENOUS ONCE
Status: COMPLETED | OUTPATIENT
Start: 2020-03-06 | End: 2020-03-06

## 2020-03-06 RX ADMIN — THIAMINE HYDROCHLORIDE 1000 ML: 100 INJECTION, SOLUTION INTRAMUSCULAR; INTRAVENOUS at 00:57

## 2020-03-06 RX ADMIN — POTASSIUM CHLORIDE 10 MEQ: 7.46 INJECTION, SOLUTION INTRAVENOUS at 00:58

## 2020-03-06 NOTE — ED PROVIDER NOTES
ED Provider Note    CHIEF COMPLAINT  Chief Complaint   Patient presents with   • Leg Swelling       [facility]    Primary care provider: None.  Means of arrival: POV  History obtained from: Patient and   History limited by: Nothing    Nichol Chaney is a 29 y.o. female who presents with left greater than right leg swelling.  Onset this afternoon.  No falls or injuries or trauma.  Associated symptoms include some paresthesias in her left leg.  Interestingly several years ago the patient had a non-provoked DVT in her left leg.  She is not currently on any anticoagulation she does take aspirin daily.  She does smoke.  No recent prolonged travel no estrogen.  No family history of clotting disease.  Unknown etiology of her isolated prior DVT.  She has no chest pain or dyspnea, but recently had influenza but has recovered from that.  No fevers or chills or travel or cough.    REVIEW OF SYSTEMS  Constitutional: Negative for fever or chills.   HENT: Negative for rhinorrhea or sore throat.    Respiratory: Negative for cough or shortness of breath.    Cardiovascular: Negative for chest pain or palpitations.   Gastrointestinal: Negative for nausea, vomiting, or abdominal pain.   Genitourinary: Negative for dysuria or flank pain.   Musculoskeletal: Negative for back pain, but positive for bilateral lower extremity swelling and left leg paresthesia  Skin: Negative for itching or rash.   Neurological: Negative for weakness, positive for left leg paresthesias  Endo/Heme/Allergies: Negative for weight changes or hives.  See HPI for further details. All other systems are negative.     PAST MEDICAL HISTORY   has a past medical history of History of DVT (deep vein thrombosis) and Tobacco use.    PAST FAMILY HISTORY  Family History   Problem Relation Age of Onset   • Cancer Mother         melanoma   • No Known Problems Father    • No Known Problems Sister    • No Known Problems Brother    • Stroke Maternal Grandmother    • Cancer  "Maternal Grandmother         breast   • Cancer Maternal Grandfather         squamous cell cancer of throat   • Diabetes Neg Hx    • Heart Disease Neg Hx    • Hyperlipidemia Neg Hx    • Hypertension Neg Hx    • Blood Clots Neg Hx        SOCIAL HISTORY  Social History     Tobacco Use   • Smoking status: Current Every Day Smoker     Packs/day: 0.25     Years: 10.00     Pack years: 2.50     Types: Cigarettes     Start date: 10/15/2018   • Smokeless tobacco: Never Used   Substance and Sexual Activity   • Alcohol use: Yes     Alcohol/week: 3.6 oz     Types: 6 Cans of beer per week   • Drug use: No   • Sexual activity: Yes     Partners: Male       SURGICAL HISTORY  patient denies any surgical history    CURRENT MEDICATIONS  Daily aspirin.    ALLERGIES  No Known Allergies    PHYSICAL EXAM  VITAL SIGNS: /87   Pulse 65   Temp 36.6 °C (97.8 °F) (Temporal)   Resp 18   Ht 1.702 m (5' 7\")   Wt 52.4 kg (115 lb 8.3 oz)   SpO2 99%   BMI 18.09 kg/m²    Pulse ox interpretation: On room air, I interpret this pulse ox as normal.  Constitutional: Well-developed, well-nourished. Sitting up.   HEENT: Normocephalic, atraumatic. Posterior pharynx clear, mucous membranes moist.  Eyes:  EOMI. Normal sclerae.  Neck: Supple, nontender.  Chest/Pulmonary: Clear to ausculation bilaterally, no wheezes or rhonchi.  Cardiovascular: Regular rate and rhythm, no murmur.  Symmetric DP and PT pulses.  Abdomen: Soft, nontender; no rebound, guarding, or masses.  Back: No CVA or midline tenderness.   Musculoskeletal: No deformity or obvious edema.  Neuro: Clear speech, normal coordination, cranial nerves II-XII grossly intact, no focal asymmetry or sensory deficits.   Psych: Normal mood and affect.  Skin: No rashes, warm and dry.      DIAGNOSTIC STUDIES / PROCEDURES    LABS & EKG  Results for orders placed or performed during the hospital encounter of 03/06/20   CBC WITH DIFFERENTIAL   Result Value Ref Range    WBC 5.6 4.8 - 10.8 K/uL    RBC 3.88 " (L) 4.20 - 5.40 M/uL    Hemoglobin 13.5 12.0 - 16.0 g/dL    Hematocrit 39.4 37.0 - 47.0 %    .5 (H) 81.4 - 97.8 fL    MCH 34.8 (H) 27.0 - 33.0 pg    MCHC 34.3 33.6 - 35.0 g/dL    RDW 49.5 35.9 - 50.0 fL    Platelet Count 163 (L) 164 - 446 K/uL    MPV 10.1 9.0 - 12.9 fL    Neutrophils-Polys 42.30 (L) 44.00 - 72.00 %    Lymphocytes 38.10 22.00 - 41.00 %    Monocytes 18.00 (H) 0.00 - 13.40 %    Eosinophils 0.90 0.00 - 6.90 %    Basophils 0.50 0.00 - 1.80 %    Immature Granulocytes 0.20 0.00 - 0.90 %    Nucleated RBC 0.00 /100 WBC    Neutrophils (Absolute) 2.35 2.00 - 7.15 K/uL    Lymphs (Absolute) 2.12 1.00 - 4.80 K/uL    Monos (Absolute) 1.00 (H) 0.00 - 0.85 K/uL    Eos (Absolute) 0.05 0.00 - 0.51 K/uL    Baso (Absolute) 0.03 0.00 - 0.12 K/uL    Immature Granulocytes (abs) 0.01 0.00 - 0.11 K/uL    NRBC (Absolute) 0.00 K/uL   Basic Metabolic Panel   Result Value Ref Range    Sodium 142 135 - 145 mmol/L    Potassium 2.8 (L) 3.6 - 5.5 mmol/L    Chloride 106 96 - 112 mmol/L    Co2 26 20 - 33 mmol/L    Glucose 88 65 - 99 mg/dL    Bun 4 (L) 8 - 22 mg/dL    Creatinine 0.42 (L) 0.50 - 1.40 mg/dL    Calcium 8.7 8.4 - 10.2 mg/dL    Anion Gap 10.0 7.0 - 16.0   D-DIMER   Result Value Ref Range    D-Dimer Screen 0.44 0.00 - 0.50 ug/mL (FEU)   ESTIMATED GFR   Result Value Ref Range    GFR If African American >60 >60 mL/min/1.73 m 2    GFR If Non African American >60 >60 mL/min/1.73 m 2   BETA-HCG QUALITATIVE SERUM   Result Value Ref Range    Beta-Hcg Qualitative Serum Negative Negative   HEPATIC FUNCTION PANEL   Result Value Ref Range    Alkaline Phosphatase 116 (H) 30 - 99 U/L    AST(SGOT) 71 (H) 12 - 45 U/L    ALT(SGPT) 58 (H) 2 - 50 U/L    Total Bilirubin 0.5 0.1 - 1.5 mg/dL    Direct Bilirubin 0.3 0.1 - 0.5 mg/dL    Indirect Bilirubin 0.2 0.0 - 1.0 mg/dL    Albumin 3.9 3.2 - 4.9 g/dL    Total Protein 6.5 6.0 - 8.2 g/dL       RADIOLOGY  US-EXTREMITY VENOUS LOWER BILAT   Final Result      No evidence of bilateral lower  extremity deep venous thrombosis.          COURSE & MEDICAL DECISION MAKING    This is a 29 y.o. female who presents with left greater than right leg swelling.  Onset today.  History of DVT.  No severe swelling on examination, normal vitals.    Differential Diagnosis includes but is not limited to:  DVT, volume overload, electrolyte abnormality, neuropathy, nephrotic syndrome    ED Course:  Plan labs and ultrasound.  Interestingly, the patient has significant hypokalemia to 2.8.  Electrolytes are otherwise stable.  D-dimer ordered in triage is thankfully negative.  However, given history of clot she merits evaluation with DVT ultrasound.  Plan parenteral vitamin repletion and IV fluid rehydration until a surgical process is ruled out and must keep her n.p.o. hence treatment with parenteral crystalloid bolus.    Thankfully work-up is reassuring today.  Patient has management.  Dimer negative.  Electrolyte hypokalemia under replete.  No acidosis.  No fevers white count normal doubt infection.  No critical anemia.  No trauma doubt x-rays would  she can bear weight, thankfully ultrasound of lower extremity shows no DVT.    On recheck after fluids and electrolyte repletion the patient's feeling better the feel she has had a positive response to parenteral rehydration.  Relieved with reassuring work-up comfortable discharge, instructed to return immediately if symptoms persist or worsen, schedulers contacted to arrange a new primary care provider for recheck and routine care.  If she develops any worsening leg pain or swelling or dyspnea or any other new or worsening symptoms like chest pain she will seek emergent reevaluation.  Encouraged high potassium foods and rest, work note provided for today, patient has been understand and agree with plan.    Medications   detox IV 1000 mL (D5LR + magnesium 1 g + thiamine 100 mg + folic acid 1 mg) infusion (1,000 mL Intravenous Given 3/6/20 0057)   potassium  chloride (KCL) ivpb 10 mEq (0 mEq Intravenous Stopped 3/6/20 0215)       FINAL IMPRESSION  1. Swelling of lower leg    2. Paresthesia    3. Hypokalemia    4. Dehydration    5. History of DVT (deep vein thrombosis)    6. Left leg pain        PRESCRIPTIONS  Discharge Medication List as of 3/6/2020  2:53 AM          FOLLOW UP  [facility], Emergency Dept  [address]  [place] [id number] [phone]  Today  If you have ANY new or worse symptoms!    Our primary care clinics  Schedulers should contact you in the next few days to arrange a new primary care provider for recheck and routine health care.  Schedule an appointment as soon as possible for a visit in 2 days          -DISCHARGE-       Results, exam findings, clinical impression, presumed diagnosis, treatment options, and strict return precautions were discussed with the patient and family, and they verbalized understanding, agreed with, and appreciated the plan of care.    Pertinent Labs & Imaging studies reviewed and verified by myself, as well as nursing notes and the patient's past medical, family, and social histories (See chart for details).  The patient is referred to a primary physician for blood pressure management, diabetic screening, and for all other preventative health concerns.     Portions of this record were made with voice recognition software.  Despite my review, spelling/grammar/context errors may still remain.  Interpretation of this chart should be taken in this context.    Electronically signed by Julio César Craig M.D. on 3/6/2020 at 7:54 AM.

## 2020-03-06 NOTE — DISCHARGE INSTRUCTIONS
You were seen and evaluated in the Emergency Department at [facility] for:     Swelling to your leg and tingling sensation    You had the following tests and studies:    Thankfully we do not see anything dangerous like clots, you do have a low potassium levels to try to eat more bananas and potatoes and tomatoes.    You received the following medications:    IV fluids and electrolytes  ----------------------------    Please make sure to follow up with:    We will contact her schedulers to get your new primary care provider for recheck and routine health care, but if you have any new or worsening pain or swelling or trouble breathing or sensory changes or any other concerns return to the ER immediately.    Good luck, we hope you get better soon!  ----------------------------    We always encourage patients to return IMMEDIATELY if they have:  Increased or changing pain, passing out, fevers over 100.4 (taken in your mouth or rectally) for more than 2 days, redness or swelling of skin or tissues, feeling like your heart is beating fast, chest pain that is new or worsening, trouble breathing, feeling like your throat is closing up and can not breath, inability to walk, weakness of any part of your body, new dizziness, severe bleeding that won't stop from any part of your body, if you can't eat or drink, or if you have any other concerns.   If you feel worse, please know that you can always return with any questions, concerns, worse symptoms, or you are feeling unsafe. We certainly cannot say for sure that we have ruled out every illness or dangerous disease, but we feel that at this specific time, your exam, tests, and vital signs like heart rate and blood pressure are safe for discharge.

## 2020-03-06 NOTE — ED NOTES
Patient complaining of bilateral lower leg pain and swelling, stated she was on her feet all day at work.  Patient worried about blood clot.

## 2021-11-16 ENCOUNTER — APPOINTMENT (OUTPATIENT)
Dept: RADIOLOGY | Facility: MEDICAL CENTER | Age: 31
End: 2021-11-16

## 2021-11-16 ENCOUNTER — HOSPITAL ENCOUNTER (EMERGENCY)
Facility: MEDICAL CENTER | Age: 31
End: 2021-11-16

## 2021-11-16 VITALS
WEIGHT: 108.69 LBS | RESPIRATION RATE: 16 BRPM | HEART RATE: 85 BPM | TEMPERATURE: 97.5 F | DIASTOLIC BLOOD PRESSURE: 87 MMHG | OXYGEN SATURATION: 93 % | BODY MASS INDEX: 17.06 KG/M2 | SYSTOLIC BLOOD PRESSURE: 112 MMHG | HEIGHT: 67 IN

## 2021-11-16 PROCEDURE — 302449 STATCHG TRIAGE ONLY (STATISTIC)

## 2021-11-16 ASSESSMENT — FIBROSIS 4 INDEX: FIB4 SCORE: 1.72

## 2021-11-16 NOTE — ED NOTES
Called for in lobby to re-vital. No show. Not in either lobby  
Called repeatedly in triage for blood work. Not in lobby.  
Pt called for re-vital in lobby, no response. Pt dismissed from ER lobby.  
rarely/Yes

## 2021-12-07 ENCOUNTER — APPOINTMENT (OUTPATIENT)
Dept: RADIOLOGY | Facility: MEDICAL CENTER | Age: 31
DRG: 897 | End: 2021-12-07
Attending: EMERGENCY MEDICINE

## 2021-12-07 ENCOUNTER — HOSPITAL ENCOUNTER (INPATIENT)
Facility: MEDICAL CENTER | Age: 31
LOS: 3 days | DRG: 897 | End: 2021-12-10
Attending: EMERGENCY MEDICINE | Admitting: HOSPITALIST

## 2021-12-07 DIAGNOSIS — F10.231 ALCOHOL DEPENDENCE WITH WITHDRAWAL DELIRIUM (HCC): ICD-10-CM

## 2021-12-07 DIAGNOSIS — N39.0 ACUTE UTI: ICD-10-CM

## 2021-12-07 DIAGNOSIS — S03.00XA CLOSED DISLOCATION OF JAW, INITIAL ENCOUNTER: ICD-10-CM

## 2021-12-07 DIAGNOSIS — S09.90XA CLOSED HEAD INJURY, INITIAL ENCOUNTER: ICD-10-CM

## 2021-12-07 DIAGNOSIS — R56.9 SEIZURE (HCC): ICD-10-CM

## 2021-12-07 DIAGNOSIS — R00.2 PALPITATIONS: ICD-10-CM

## 2021-12-07 DIAGNOSIS — D69.6 THROMBOCYTOPENIA (HCC): ICD-10-CM

## 2021-12-07 DIAGNOSIS — R79.89 LFT ELEVATION: ICD-10-CM

## 2021-12-07 DIAGNOSIS — F10.239 ALCOHOL DEPENDENCE WITH WITHDRAWAL WITH COMPLICATION (HCC): ICD-10-CM

## 2021-12-07 PROBLEM — E87.6 HYPOKALEMIA: Status: ACTIVE | Noted: 2021-12-07

## 2021-12-07 PROBLEM — N30.00 ACUTE CYSTITIS WITHOUT HEMATURIA: Status: ACTIVE | Noted: 2021-12-07

## 2021-12-07 PROBLEM — G93.40 ACUTE ENCEPHALOPATHY: Status: ACTIVE | Noted: 2021-12-07

## 2021-12-07 LAB
ALBUMIN SERPL BCP-MCNC: 4.4 G/DL (ref 3.2–4.9)
ALBUMIN/GLOB SERPL: 1.3 G/DL
ALP SERPL-CCNC: 174 U/L (ref 30–99)
ALT SERPL-CCNC: 66 U/L (ref 2–50)
ANION GAP SERPL CALC-SCNC: 22 MMOL/L (ref 7–16)
APPEARANCE UR: ABNORMAL
AST SERPL-CCNC: 180 U/L (ref 12–45)
BACTERIA #/AREA URNS HPF: ABNORMAL /HPF
BASOPHILS # BLD AUTO: 0.2 % (ref 0–1.8)
BASOPHILS # BLD: 0.01 K/UL (ref 0–0.12)
BILIRUB SERPL-MCNC: 2.6 MG/DL (ref 0.1–1.5)
BILIRUB UR QL STRIP.AUTO: ABNORMAL
BUN SERPL-MCNC: 7 MG/DL (ref 8–22)
CALCIUM SERPL-MCNC: 9.5 MG/DL (ref 8.4–10.2)
CHLORIDE SERPL-SCNC: 92 MMOL/L (ref 96–112)
CO2 SERPL-SCNC: 21 MMOL/L (ref 20–33)
COLOR UR: YELLOW
COMMENT 1642: NORMAL
CREAT SERPL-MCNC: 0.45 MG/DL (ref 0.5–1.4)
EKG IMPRESSION: NORMAL
EOSINOPHIL # BLD AUTO: 0.01 K/UL (ref 0–0.51)
EOSINOPHIL NFR BLD: 0.2 % (ref 0–6.9)
EPI CELLS #/AREA URNS HPF: ABNORMAL /HPF
ERYTHROCYTE [DISTWIDTH] IN BLOOD BY AUTOMATED COUNT: 52 FL (ref 35.9–50)
GLOBULIN SER CALC-MCNC: 3.4 G/DL (ref 1.9–3.5)
GLUCOSE BLD-MCNC: 135 MG/DL (ref 65–99)
GLUCOSE SERPL-MCNC: 70 MG/DL (ref 65–99)
GLUCOSE UR STRIP.AUTO-MCNC: 100 MG/DL
HCG SERPL QL: NEGATIVE
HCT VFR BLD AUTO: 40.9 % (ref 37–47)
HGB BLD-MCNC: 14.4 G/DL (ref 12–16)
HYALINE CASTS #/AREA URNS LPF: ABNORMAL /LPF
IMM GRANULOCYTES # BLD AUTO: 0 K/UL (ref 0–0.11)
IMM GRANULOCYTES NFR BLD AUTO: 0 % (ref 0–0.9)
KETONES UR STRIP.AUTO-MCNC: >=80 MG/DL
LEUKOCYTE ESTERASE UR QL STRIP.AUTO: ABNORMAL
LIPASE SERPL-CCNC: 49 U/L (ref 7–58)
LYMPHOCYTES # BLD AUTO: 0.99 K/UL (ref 1–4.8)
LYMPHOCYTES NFR BLD: 24.7 % (ref 22–41)
MAGNESIUM SERPL-MCNC: 1.5 MG/DL (ref 1.5–2.5)
MCH RBC QN AUTO: 36.4 PG (ref 27–33)
MCHC RBC AUTO-ENTMCNC: 35.2 G/DL (ref 33.6–35)
MCV RBC AUTO: 103.3 FL (ref 81.4–97.8)
MICRO URNS: ABNORMAL
MONOCYTES # BLD AUTO: 0.72 K/UL (ref 0–0.85)
MONOCYTES NFR BLD AUTO: 18 % (ref 0–13.4)
NEUTROPHILS # BLD AUTO: 2.28 K/UL (ref 2–7.15)
NEUTROPHILS NFR BLD: 56.9 % (ref 44–72)
NITRITE UR QL STRIP.AUTO: POSITIVE
NRBC # BLD AUTO: 0 K/UL
NRBC BLD-RTO: 0 /100 WBC
PH UR STRIP.AUTO: 5.5 [PH] (ref 5–8)
PLATELET # BLD AUTO: 43 K/UL (ref 164–446)
PLATELETS.RETICULATED NFR BLD AUTO: 12.4 K/UL (ref 0.6–13.1)
PMV BLD AUTO: 12 FL (ref 9–12.9)
POTASSIUM SERPL-SCNC: 3.5 MMOL/L (ref 3.6–5.5)
PROT SERPL-MCNC: 7.8 G/DL (ref 6–8.2)
PROT UR QL STRIP: 30 MG/DL
RBC # BLD AUTO: 3.96 M/UL (ref 4.2–5.4)
RBC # URNS HPF: ABNORMAL /HPF
RBC UR QL AUTO: NEGATIVE
SODIUM SERPL-SCNC: 135 MMOL/L (ref 135–145)
SP GR UR STRIP.AUTO: 1.02
TROPONIN T SERPL-MCNC: <6 NG/L (ref 6–19)
TSH SERPL DL<=0.005 MIU/L-ACNC: 1.26 UIU/ML (ref 0.38–5.33)
WBC # BLD AUTO: 4 K/UL (ref 4.8–10.8)
WBC #/AREA URNS HPF: ABNORMAL /HPF

## 2021-12-07 PROCEDURE — 700111 HCHG RX REV CODE 636 W/ 250 OVERRIDE (IP)

## 2021-12-07 PROCEDURE — 82962 GLUCOSE BLOOD TEST: CPT

## 2021-12-07 PROCEDURE — 99223 1ST HOSP IP/OBS HIGH 75: CPT | Mod: AI | Performed by: HOSPITALIST

## 2021-12-07 PROCEDURE — 83735 ASSAY OF MAGNESIUM: CPT

## 2021-12-07 PROCEDURE — 700101 HCHG RX REV CODE 250: Performed by: EMERGENCY MEDICINE

## 2021-12-07 PROCEDURE — 700105 HCHG RX REV CODE 258: Performed by: EMERGENCY MEDICINE

## 2021-12-07 PROCEDURE — 700111 HCHG RX REV CODE 636 W/ 250 OVERRIDE (IP): Performed by: HOSPITALIST

## 2021-12-07 PROCEDURE — 96366 THER/PROPH/DIAG IV INF ADDON: CPT

## 2021-12-07 PROCEDURE — HZ2ZZZZ DETOXIFICATION SERVICES FOR SUBSTANCE ABUSE TREATMENT: ICD-10-PCS | Performed by: HOSPITALIST

## 2021-12-07 PROCEDURE — 71045 X-RAY EXAM CHEST 1 VIEW: CPT

## 2021-12-07 PROCEDURE — 0NST35Z REPOSITION RIGHT MANDIBLE WITH EXTERNAL FIXATION DEVICE, PERCUTANEOUS APPROACH: ICD-10-PCS | Performed by: EMERGENCY MEDICINE

## 2021-12-07 PROCEDURE — 70486 CT MAXILLOFACIAL W/O DYE: CPT

## 2021-12-07 PROCEDURE — 94760 N-INVAS EAR/PLS OXIMETRY 1: CPT

## 2021-12-07 PROCEDURE — 99285 EMERGENCY DEPT VISIT HI MDM: CPT

## 2021-12-07 PROCEDURE — 93005 ELECTROCARDIOGRAM TRACING: CPT

## 2021-12-07 PROCEDURE — 84703 CHORIONIC GONADOTROPIN ASSAY: CPT

## 2021-12-07 PROCEDURE — 93005 ELECTROCARDIOGRAM TRACING: CPT | Performed by: EMERGENCY MEDICINE

## 2021-12-07 PROCEDURE — 80053 COMPREHEN METABOLIC PANEL: CPT

## 2021-12-07 PROCEDURE — 76705 ECHO EXAM OF ABDOMEN: CPT

## 2021-12-07 PROCEDURE — 700101 HCHG RX REV CODE 250: Performed by: HOSPITALIST

## 2021-12-07 PROCEDURE — 0NSV35Z REPOSITION LEFT MANDIBLE WITH EXTERNAL FIXATION DEVICE, PERCUTANEOUS APPROACH: ICD-10-PCS | Performed by: EMERGENCY MEDICINE

## 2021-12-07 PROCEDURE — 96365 THER/PROPH/DIAG IV INF INIT: CPT

## 2021-12-07 PROCEDURE — 81001 URINALYSIS AUTO W/SCOPE: CPT

## 2021-12-07 PROCEDURE — 94799 UNLISTED PULMONARY SVC/PX: CPT

## 2021-12-07 PROCEDURE — 700102 HCHG RX REV CODE 250 W/ 637 OVERRIDE(OP): Performed by: HOSPITALIST

## 2021-12-07 PROCEDURE — 96376 TX/PRO/DX INJ SAME DRUG ADON: CPT

## 2021-12-07 PROCEDURE — 85025 COMPLETE CBC W/AUTO DIFF WBC: CPT

## 2021-12-07 PROCEDURE — 70450 CT HEAD/BRAIN W/O DYE: CPT

## 2021-12-07 PROCEDURE — 85055 RETICULATED PLATELET ASSAY: CPT

## 2021-12-07 PROCEDURE — 84484 ASSAY OF TROPONIN QUANT: CPT

## 2021-12-07 PROCEDURE — 770020 HCHG ROOM/CARE - TELE (206)

## 2021-12-07 PROCEDURE — 700111 HCHG RX REV CODE 636 W/ 250 OVERRIDE (IP): Performed by: EMERGENCY MEDICINE

## 2021-12-07 PROCEDURE — 96375 TX/PRO/DX INJ NEW DRUG ADDON: CPT

## 2021-12-07 PROCEDURE — 83690 ASSAY OF LIPASE: CPT

## 2021-12-07 PROCEDURE — 84443 ASSAY THYROID STIM HORMONE: CPT

## 2021-12-07 PROCEDURE — A9270 NON-COVERED ITEM OR SERVICE: HCPCS | Performed by: HOSPITALIST

## 2021-12-07 RX ORDER — LORAZEPAM 1 MG/1
1 TABLET ORAL EVERY 4 HOURS PRN
Status: DISCONTINUED | OUTPATIENT
Start: 2021-12-07 | End: 2021-12-08

## 2021-12-07 RX ORDER — LORAZEPAM 2 MG/ML
1 INJECTION INTRAMUSCULAR ONCE
Status: COMPLETED | OUTPATIENT
Start: 2021-12-07 | End: 2021-12-07

## 2021-12-07 RX ORDER — CEFTRIAXONE 2 G/1
2 INJECTION, POWDER, FOR SOLUTION INTRAMUSCULAR; INTRAVENOUS ONCE
Status: COMPLETED | OUTPATIENT
Start: 2021-12-07 | End: 2021-12-07

## 2021-12-07 RX ORDER — LORAZEPAM 0.5 MG/1
0.5 TABLET ORAL EVERY 4 HOURS PRN
Status: DISCONTINUED | OUTPATIENT
Start: 2021-12-07 | End: 2021-12-08

## 2021-12-07 RX ORDER — LORAZEPAM 2 MG/ML
2 INJECTION INTRAMUSCULAR
Status: DISCONTINUED | OUTPATIENT
Start: 2021-12-07 | End: 2021-12-08

## 2021-12-07 RX ORDER — ONDANSETRON 2 MG/ML
4 INJECTION INTRAMUSCULAR; INTRAVENOUS EVERY 4 HOURS PRN
Status: DISCONTINUED | OUTPATIENT
Start: 2021-12-07 | End: 2021-12-10 | Stop reason: HOSPADM

## 2021-12-07 RX ORDER — LORAZEPAM 2 MG/ML
1 INJECTION INTRAMUSCULAR
Status: DISCONTINUED | OUTPATIENT
Start: 2021-12-07 | End: 2021-12-08

## 2021-12-07 RX ORDER — LORAZEPAM 1 MG/1
3 TABLET ORAL
Status: DISCONTINUED | OUTPATIENT
Start: 2021-12-07 | End: 2021-12-08

## 2021-12-07 RX ORDER — FOLIC ACID 1 MG/1
1 TABLET ORAL DAILY
Status: DISCONTINUED | OUTPATIENT
Start: 2021-12-08 | End: 2021-12-08

## 2021-12-07 RX ORDER — PROPOFOL 10 MG/ML
200 INJECTION, EMULSION INTRAVENOUS ONCE
Status: COMPLETED | OUTPATIENT
Start: 2021-12-07 | End: 2021-12-07

## 2021-12-07 RX ORDER — LORAZEPAM 1 MG/1
4 TABLET ORAL
Status: DISCONTINUED | OUTPATIENT
Start: 2021-12-07 | End: 2021-12-08

## 2021-12-07 RX ORDER — ACETAMINOPHEN 325 MG/1
650 TABLET ORAL EVERY 6 HOURS PRN
Status: DISCONTINUED | OUTPATIENT
Start: 2021-12-07 | End: 2021-12-10 | Stop reason: HOSPADM

## 2021-12-07 RX ORDER — IBUPROFEN 200 MG
200 TABLET ORAL EVERY 6 HOURS PRN
Status: ON HOLD | COMMUNITY
End: 2022-10-15

## 2021-12-07 RX ORDER — BISACODYL 10 MG
10 SUPPOSITORY, RECTAL RECTAL
Status: DISCONTINUED | OUTPATIENT
Start: 2021-12-07 | End: 2021-12-10 | Stop reason: HOSPADM

## 2021-12-07 RX ORDER — ONDANSETRON 4 MG/1
4 TABLET, ORALLY DISINTEGRATING ORAL EVERY 4 HOURS PRN
Status: DISCONTINUED | OUTPATIENT
Start: 2021-12-07 | End: 2021-12-10 | Stop reason: HOSPADM

## 2021-12-07 RX ORDER — LORAZEPAM 2 MG/ML
1.5 INJECTION INTRAMUSCULAR
Status: DISCONTINUED | OUTPATIENT
Start: 2021-12-07 | End: 2021-12-08

## 2021-12-07 RX ORDER — PROMETHAZINE HYDROCHLORIDE 25 MG/1
12.5-25 TABLET ORAL EVERY 4 HOURS PRN
Status: DISCONTINUED | OUTPATIENT
Start: 2021-12-07 | End: 2021-12-10 | Stop reason: HOSPADM

## 2021-12-07 RX ORDER — LORAZEPAM 1 MG/1
2 TABLET ORAL
Status: DISCONTINUED | OUTPATIENT
Start: 2021-12-07 | End: 2021-12-08

## 2021-12-07 RX ORDER — CHLORDIAZEPOXIDE HYDROCHLORIDE 5 MG/1
10 CAPSULE, GELATIN COATED ORAL EVERY 8 HOURS
Status: DISCONTINUED | OUTPATIENT
Start: 2021-12-07 | End: 2021-12-07

## 2021-12-07 RX ORDER — OXYCODONE HYDROCHLORIDE 5 MG/1
2.5 TABLET ORAL
Status: DISCONTINUED | OUTPATIENT
Start: 2021-12-07 | End: 2021-12-10 | Stop reason: HOSPADM

## 2021-12-07 RX ORDER — LORAZEPAM 2 MG/ML
INJECTION INTRAMUSCULAR
Status: COMPLETED
Start: 2021-12-07 | End: 2021-12-07

## 2021-12-07 RX ORDER — AMOXICILLIN 250 MG
2 CAPSULE ORAL 2 TIMES DAILY
Status: DISCONTINUED | OUTPATIENT
Start: 2021-12-07 | End: 2021-12-10 | Stop reason: HOSPADM

## 2021-12-07 RX ORDER — LORAZEPAM 2 MG/ML
0.5 INJECTION INTRAMUSCULAR EVERY 4 HOURS PRN
Status: DISCONTINUED | OUTPATIENT
Start: 2021-12-07 | End: 2021-12-08

## 2021-12-07 RX ORDER — OXYCODONE HYDROCHLORIDE 5 MG/1
5 TABLET ORAL
Status: DISCONTINUED | OUTPATIENT
Start: 2021-12-07 | End: 2021-12-10 | Stop reason: HOSPADM

## 2021-12-07 RX ORDER — GAUZE BANDAGE 2" X 2"
100 BANDAGE TOPICAL DAILY
Status: DISCONTINUED | OUTPATIENT
Start: 2021-12-08 | End: 2021-12-08

## 2021-12-07 RX ORDER — POLYETHYLENE GLYCOL 3350 17 G/17G
1 POWDER, FOR SOLUTION ORAL
Status: DISCONTINUED | OUTPATIENT
Start: 2021-12-07 | End: 2021-12-10 | Stop reason: HOSPADM

## 2021-12-07 RX ORDER — SODIUM CHLORIDE, SODIUM LACTATE, POTASSIUM CHLORIDE, CALCIUM CHLORIDE 600; 310; 30; 20 MG/100ML; MG/100ML; MG/100ML; MG/100ML
1000 INJECTION, SOLUTION INTRAVENOUS ONCE
Status: COMPLETED | OUTPATIENT
Start: 2021-12-07 | End: 2021-12-07

## 2021-12-07 RX ORDER — SODIUM CHLORIDE AND POTASSIUM CHLORIDE 150; 900 MG/100ML; MG/100ML
INJECTION, SOLUTION INTRAVENOUS CONTINUOUS
Status: DISCONTINUED | OUTPATIENT
Start: 2021-12-07 | End: 2021-12-09

## 2021-12-07 RX ORDER — SODIUM CHLORIDE 9 MG/ML
1000 INJECTION, SOLUTION INTRAVENOUS ONCE
Status: COMPLETED | OUTPATIENT
Start: 2021-12-07 | End: 2021-12-07

## 2021-12-07 RX ORDER — PROMETHAZINE HYDROCHLORIDE 25 MG/1
12.5-25 SUPPOSITORY RECTAL EVERY 4 HOURS PRN
Status: DISCONTINUED | OUTPATIENT
Start: 2021-12-07 | End: 2021-12-10 | Stop reason: HOSPADM

## 2021-12-07 RX ORDER — PROCHLORPERAZINE EDISYLATE 5 MG/ML
5-10 INJECTION INTRAMUSCULAR; INTRAVENOUS EVERY 4 HOURS PRN
Status: DISCONTINUED | OUTPATIENT
Start: 2021-12-07 | End: 2021-12-10 | Stop reason: HOSPADM

## 2021-12-07 RX ORDER — CEFTRIAXONE 2 G/1
2 INJECTION, POWDER, FOR SOLUTION INTRAMUSCULAR; INTRAVENOUS ONCE
Status: DISCONTINUED | OUTPATIENT
Start: 2021-12-07 | End: 2021-12-07

## 2021-12-07 RX ORDER — HYDROMORPHONE HYDROCHLORIDE 1 MG/ML
0.25 INJECTION, SOLUTION INTRAMUSCULAR; INTRAVENOUS; SUBCUTANEOUS
Status: DISCONTINUED | OUTPATIENT
Start: 2021-12-07 | End: 2021-12-10 | Stop reason: HOSPADM

## 2021-12-07 RX ORDER — CHLORDIAZEPOXIDE HYDROCHLORIDE 25 MG/1
25 CAPSULE, GELATIN COATED ORAL EVERY 8 HOURS
Status: DISCONTINUED | OUTPATIENT
Start: 2021-12-08 | End: 2021-12-09

## 2021-12-07 RX ADMIN — LORAZEPAM 1.5 MG: 2 INJECTION INTRAMUSCULAR; INTRAVENOUS at 19:16

## 2021-12-07 RX ADMIN — THIAMINE HYDROCHLORIDE: 100 INJECTION, SOLUTION INTRAMUSCULAR; INTRAVENOUS at 16:16

## 2021-12-07 RX ADMIN — CHLORDIAZEPOXIDE HYDROCHLORIDE 10 MG: 5 CAPSULE ORAL at 22:01

## 2021-12-07 RX ADMIN — LORAZEPAM 2 MG: 2 INJECTION INTRAMUSCULAR; INTRAVENOUS at 19:29

## 2021-12-07 RX ADMIN — CEFTRIAXONE SODIUM 2 G: 2 INJECTION, POWDER, FOR SOLUTION INTRAMUSCULAR; INTRAVENOUS at 17:44

## 2021-12-07 RX ADMIN — LORAZEPAM 2 MG: 2 INJECTION INTRAMUSCULAR; INTRAVENOUS at 19:42

## 2021-12-07 RX ADMIN — POTASSIUM CHLORIDE AND SODIUM CHLORIDE: 900; 150 INJECTION, SOLUTION INTRAVENOUS at 19:51

## 2021-12-07 RX ADMIN — SODIUM CHLORIDE, POTASSIUM CHLORIDE, SODIUM LACTATE AND CALCIUM CHLORIDE 1000 ML: 600; 310; 30; 20 INJECTION, SOLUTION INTRAVENOUS at 12:17

## 2021-12-07 RX ADMIN — THIAMINE HYDROCHLORIDE: 100 INJECTION, SOLUTION INTRAMUSCULAR; INTRAVENOUS at 14:04

## 2021-12-07 RX ADMIN — PROPOFOL 200 MG: 10 INJECTION, EMULSION INTRAVENOUS at 16:00

## 2021-12-07 RX ADMIN — LORAZEPAM 1.5 MG: 2 INJECTION INTRAMUSCULAR; INTRAVENOUS at 17:53

## 2021-12-07 RX ADMIN — LORAZEPAM 1.5 MG: 2 INJECTION INTRAMUSCULAR; INTRAVENOUS at 20:46

## 2021-12-07 RX ADMIN — LORAZEPAM 1.5 MG: 2 INJECTION INTRAMUSCULAR; INTRAVENOUS at 23:23

## 2021-12-07 RX ADMIN — LORAZEPAM 1 MG: 2 INJECTION INTRAMUSCULAR; INTRAVENOUS at 17:08

## 2021-12-07 RX ADMIN — SODIUM CHLORIDE 1000 ML: 9 INJECTION, SOLUTION INTRAVENOUS at 16:18

## 2021-12-07 RX ADMIN — LORAZEPAM 1.5 MG: 2 INJECTION INTRAMUSCULAR; INTRAVENOUS at 21:59

## 2021-12-07 RX ADMIN — LORAZEPAM 1 MG: 2 INJECTION INTRAMUSCULAR; INTRAVENOUS at 15:00

## 2021-12-07 ASSESSMENT — ENCOUNTER SYMPTOMS
BRUISES/BLEEDS EASILY: 0
COUGH: 0
FLANK PAIN: 0
HALLUCINATIONS: 1
PALPITATIONS: 1
EYE DISCHARGE: 0
MYALGIAS: 0
SEIZURES: 1
ROS GI COMMENTS: ANOREXIA
STRIDOR: 0
NERVOUS/ANXIOUS: 1
SHORTNESS OF BREATH: 0
EYE REDNESS: 0

## 2021-12-07 ASSESSMENT — LIFESTYLE VARIABLES
AUDITORY DISTURBANCES: MILD HARSHNESS OR ABILITY TO FRIGHTEN
TREMOR: *
TOTAL SCORE: 26
AGITATION: MODERATELY FIDGETY AND RESTLESS
AGITATION: NORMAL ACTIVITY
ANXIETY: *
VISUAL DISTURBANCES: MODERATELY SEVERE HALLUCINATIONS
ORIENTATION AND CLOUDING OF SENSORIUM: ORIENTED AND CAN DO SERIAL ADDITIONS
HEADACHE, FULLNESS IN HEAD: NOT PRESENT
ANXIETY: *
NAUSEA AND VOMITING: NO NAUSEA AND NO VOMITING
ORIENTATION AND CLOUDING OF SENSORIUM: DATE DISORIENTATION BY MORE THAN TWO CALENDAR DAYS
ANXIETY: NO ANXIETY (AT EASE)
TOTAL SCORE: VERY MILD ITCHING, PINS AND NEEDLES SENSATION, BURNING OR NUMBNESS
AGITATION: *
ORIENTATION AND CLOUDING OF SENSORIUM: DATE DISORIENTATION BY NO MORE THAN TWO CALENDAR DAYS
ORIENTATION AND CLOUDING OF SENSORIUM: DATE DISORIENTATION BY NO MORE THAN TWO CALENDAR DAYS
TOTAL SCORE: 18
VISUAL DISTURBANCES: NOT PRESENT
TOTAL SCORE: 16
NAUSEA AND VOMITING: NO NAUSEA AND NO VOMITING
TOTAL SCORE: 20
PAROXYSMAL SWEATS: NO SWEAT VISIBLE
AGITATION: *
TREMOR: MODERATE TREMOR WITH ARMS EXTENDED
AUDITORY DISTURBANCES: NOT PRESENT
PAROXYSMAL SWEATS: NO SWEAT VISIBLE
PAROXYSMAL SWEATS: NO SWEAT VISIBLE
TREMOR: *
AGITATION: MODERATELY FIDGETY AND RESTLESS
TREMOR: *
PAROXYSMAL SWEATS: BARELY PERCEPTIBLE SWEATING. PALMS MOIST
PAROXYSMAL SWEATS: NO SWEAT VISIBLE
HEADACHE, FULLNESS IN HEAD: MILD
ANXIETY: *
NAUSEA AND VOMITING: NO NAUSEA AND NO VOMITING
ORIENTATION AND CLOUDING OF SENSORIUM: ORIENTED AND CAN DO SERIAL ADDITIONS
AUDITORY DISTURBANCES: MILD HARSHNESS OR ABILITY TO FRIGHTEN
VISUAL DISTURBANCES: MODERATELY SEVERE HALLUCINATIONS
TOTAL SCORE: 19
ANXIETY: MODERATELY ANXIOUS OR GUARDED, SO ANXIETY IS INFERRED
AUDITORY DISTURBANCES: MODERATE HARSHNESS OR ABILITY TO FRIGHTEN
ORIENTATION AND CLOUDING OF SENSORIUM: DATE DISORIENTATION BY NO MORE THAN TWO CALENDAR DAYS
NAUSEA AND VOMITING: NO NAUSEA AND NO VOMITING
VISUAL DISTURBANCES: MODERATELY SEVERE HALLUCINATIONS
AGITATION: *
AUDITORY DISTURBANCES: MILD HARSHNESS OR ABILITY TO FRIGHTEN
ANXIETY: MODERATELY ANXIOUS OR GUARDED, SO ANXIETY IS INFERRED
ANXIETY: MODERATELY ANXIOUS OR GUARDED, SO ANXIETY IS INFERRED
PAROXYSMAL SWEATS: NO SWEAT VISIBLE
PAROXYSMAL SWEATS: NO SWEAT VISIBLE
AUDITORY DISTURBANCES: NOT PRESENT
PAROXYSMAL SWEATS: NO SWEAT VISIBLE
TREMOR: *
ANXIETY: *
HEADACHE, FULLNESS IN HEAD: NOT PRESENT
HEADACHE, FULLNESS IN HEAD: MILD
NAUSEA AND VOMITING: NO NAUSEA AND NO VOMITING
ORIENTATION AND CLOUDING OF SENSORIUM: DATE DISORIENTATION BY NO MORE THAN TWO CALENDAR DAYS
ORIENTATION AND CLOUDING OF SENSORIUM: DATE DISORIENTATION BY NO MORE THAN TWO CALENDAR DAYS
TOTAL SCORE: 4
NAUSEA AND VOMITING: NO NAUSEA AND NO VOMITING
HEADACHE, FULLNESS IN HEAD: NOT PRESENT
TREMOR: *
NAUSEA AND VOMITING: NO NAUSEA AND NO VOMITING
AUDITORY DISTURBANCES: MILD HARSHNESS OR ABILITY TO FRIGHTEN
HEADACHE, FULLNESS IN HEAD: VERY MILD
TOTAL SCORE: 25
HEADACHE, FULLNESS IN HEAD: VERY MILD
TREMOR: *
TOTAL SCORE: 20
AGITATION: MODERATELY FIDGETY AND RESTLESS
NAUSEA AND VOMITING: NO NAUSEA AND NO VOMITING
TOTAL SCORE: MILD ITCHING, PINS AND NEEDLES SENSATION, BURNING OR NUMBNESS
TREMOR: MODERATE TREMOR WITH ARMS EXTENDED
VISUAL DISTURBANCES: MODERATE SENSITIVITY
HEADACHE, FULLNESS IN HEAD: NOT PRESENT
AUDITORY DISTURBANCES: NOT PRESENT
VISUAL DISTURBANCES: MODERATELY SEVERE HALLUCINATIONS
VISUAL DISTURBANCES: SEVERE HALLUCINATIONS
VISUAL DISTURBANCES: MODERATE SENSITIVITY
AGITATION: *

## 2021-12-07 ASSESSMENT — FIBROSIS 4 INDEX
FIB4 SCORE: 1.72
FIB4 SCORE: 15.46

## 2021-12-07 ASSESSMENT — PAIN DESCRIPTION - PAIN TYPE
TYPE: ACUTE PAIN
TYPE: ACUTE PAIN

## 2021-12-07 NOTE — ED TRIAGE NOTES
"Chief Complaint   Patient presents with   • Loss of Appetite     x 2 days   • Rapid Heart Beat     Pt states is unabel to sleep r/t \"keep waking up w/ my heart pounding\"     /87   Pulse 83   Temp 36.3 °C (97.4 °F) (Temporal)   Resp 14   Ht 1.702 m (5' 7\")   Wt 46.3 kg (102 lb 1.2 oz)   LMP 11/07/2021   SpO2 100%   BMI 15.99 kg/m²     Has this patient been vaccinated for COVID NO  If not, would they like to be vaccinated while in the ER if eligible?  NO  Would the patient like to speak with the ERP about the possibility of receiving the COVID vaccine today before making a decision? NO      "

## 2021-12-07 NOTE — ED NOTES
Celena from Lab called with critical result of PLATELETS 43 at 1253. Critical lab result read back to Celena.   Dr. Hart notified of critical lab result at 1253.  Critical lab result read back by Dr. Hart.

## 2021-12-07 NOTE — DISCHARGE PLANNING
Anticipated Discharge Disposition: Unknown    Action: ER RN contacted ER CM. Pt wanted to go AMA to  son from school. Clinically not appropriate to do so. ER CM reached out to school [employer] . Sons name Wilian Givens.  Emergency contact at school for [employer] is different than facesheet  [phone] that ER CM tried and unable to get call to go thru, the school had [phone]. He has been contacted and son will be with [employer]. No further concerns. Updated RN to advise pt.    Barriers to Discharge: Unknown    Plan: Will cont to follow

## 2021-12-07 NOTE — ED NOTES
Med rec completed per patient  Allergies reviewed  No PO Antibiotics in the last 30 days     Patients preferred pharmacy is [facility] [address]

## 2021-12-07 NOTE — ED NOTES
Patient was in the process of signing out AMA, and was dressing herself, went to check on patient, and she was found face down underneath a chair convusling. Called for assistance, and rolled patient onto her side.  MD was notified. Shortly after being on her side, the convulsing stopped, and she was transferred back into the [facility]. Once in the [facility] the patient began to convulse once again, and she was turned onto her side again. Re-establised IV access, and placed patient back on cardiac monitor, pulse ox, and blood pressure cuff. Pharmacy at bedside, and she was given ativan 1mg. Repeat EKG completed.

## 2021-12-07 NOTE — ED NOTES
and myself at bedside speaking with patient about relocating her jaw.  Patient agrees to have us use conscious sedation and aware and in agreement of plan of care

## 2021-12-07 NOTE — DISCHARGE PLANNING
Anticipated Discharge Disposition: Unknown    Action: ER RN called. Concerns for child at school and pt wanting to leave ama to pick child up. Needs to stay for treatment. ER CM tried numbers on chart. ER CM call school [employer] [phone]. Spoke with Admin. Emergency contact Tyrone Tineo  Alternate number found . He has been contacted and will  Wilianbrandyn Givens. Advised ER RN bibiana to update patient.    Barriers to Discharge: Unknown    Plan: No further needs

## 2021-12-07 NOTE — ED NOTES
Dr. Hart at patient bedside discussing leaving AMA. Patient ultimately decided to leave AMA. Fluids stopped, and PIV DC'd.

## 2021-12-07 NOTE — ED NOTES
Patient stated that she needs to leave due to having to  her son. Patient advised that not all imaging has resulted, and that her fluids are not completed. Patient insisted that she needs to leave as soon as possible. MD Notified.

## 2021-12-07 NOTE — ED PROVIDER NOTES
"ED Provider Note    CHIEF COMPLAINT  Chief Complaint   Patient presents with   • Loss of Appetite     x 2 days   • Rapid Heart Beat     Pt states is unabel to sleep r/t \"keep waking up w/ my heart pounding\"       HPI  Nichol Chaney is a 31 y.o. female who presents to the emergency department for palpitations, weakness and just generally not feeling well.  The patient states she just generally does not eat much in general.  Generally drinks water or juice.  The 2 days ago she ate and then she felt unwell.  She has epigastric discomfort and she felt like she needed to vomit.  She vomited some food.  Since that time she had decreased appetite for food and fluid and is vomited 2 more times.  Denies any more abdominal pain.  No diarrhea or constipation.  Is been assessed with feeling generally unwell and weak.  Last time she tried sleep she had palpitations and a rapid heartbeat.  This lasted for couple of hours.  It went away.  She did not come to the hospital.  Today she is driving at work when she felt weak specifically in her arms felt like she had no energy and they felt somewhat wobbly.  The patient also planes of pain and swelling her left leg.  She also states that she is concerned she might have a DVT because of previous DVT.  No chest pain or shortness of breath but she does have palpitations.  She denies any other acute concerns or complaints.    REVIEW OF SYSTEMS  See HPI for further details. All other systems are negative.    PAST MEDICAL HISTORY  Past Medical History:   Diagnosis Date   • History of DVT (deep vein thrombosis)    • Tobacco use        FAMILY HISTORY  Family History   Problem Relation Age of Onset   • Cancer Mother         melanoma   • No Known Problems Father    • No Known Problems Sister    • No Known Problems Brother    • Stroke Maternal Grandmother    • Cancer Maternal Grandmother         breast   • Cancer Maternal Grandfather         squamous cell cancer of throat   • Diabetes Neg Hx  "   • Heart Disease Neg Hx    • Hyperlipidemia Neg Hx    • Hypertension Neg Hx    • Blood Clots Neg Hx        SOCIAL HISTORY  Social History     Socioeconomic History   • Marital status: Single     Spouse name: Not on file   • Number of children: Not on file   • Years of education: Not on file   • Highest education level: Not on file   Occupational History   • Not on file   Tobacco Use   • Smoking status: Current Every Day Smoker     Packs/day: 0.25     Years: 10.00     Pack years: 2.50     Types: Cigarettes     Start date: 10/15/2018   • Smokeless tobacco: Never Used   Substance and Sexual Activity   • Alcohol use: Yes     Alcohol/week: 3.6 oz     Types: 6 Cans of beer per week   • Drug use: No   • Sexual activity: Yes     Partners: Male   Other Topics Concern   • Not on file   Social History Narrative   • Not on file     Social Determinants of Health     Financial Resource Strain:    • Difficulty of Paying Living Expenses: Not on file   Food Insecurity:    • Worried About Running Out of Food in the Last Year: Not on file   • Ran Out of Food in the Last Year: Not on file   Transportation Needs:    • Lack of Transportation (Medical): Not on file   • Lack of Transportation (Non-Medical): Not on file   Physical Activity:    • Days of Exercise per Week: Not on file   • Minutes of Exercise per Session: Not on file   Stress:    • Feeling of Stress : Not on file   Social Connections:    • Frequency of Communication with Friends and Family: Not on file   • Frequency of Social Gatherings with Friends and Family: Not on file   • Attends Religion Services: Not on file   • Active Member of Clubs or Organizations: Not on file   • Attends Club or Organization Meetings: Not on file   • Marital Status: Not on file   Intimate Partner Violence:    • Fear of Current or Ex-Partner: Not on file   • Emotionally Abused: Not on file   • Physically Abused: Not on file   • Sexually Abused: Not on file   Housing Stability:    • Unable to Pay  "for Housing in the Last Year: Not on file   • Number of Places Lived in the Last Year: Not on file   • Unstable Housing in the Last Year: Not on file       SURGICAL HISTORY  History reviewed. No pertinent surgical history.    CURRENT MEDICATIONS  Home Medications     Reviewed by Kiana Hernandez (Pharmacy Tech) on 12/07/21 at 1157  Med List Status: Complete   Medication Last Dose Status   ibuprofen (MOTRIN) 200 MG Tab 12/6/2021 Active                ALLERGIES  Allergies   Allergen Reactions   • Other Food      seafood       PHYSICAL EXAM  VITAL SIGNS: /87   Pulse 83   Temp 36.3 °C (97.4 °F) (Temporal)   Resp 14   Ht 1.702 m (5' 7\")   Wt 46.3 kg (102 lb 1.2 oz)   LMP 11/07/2021   SpO2 100%   BMI 15.99 kg/m²    Constitutional: Well developed, Well nourished, No acute distress, Non-toxic appearance.   HENT: Normocephalic, Atraumatic, Bilateral external ears normal,  Eyes: PERRL, EOMI, Conjunctiva normal, No discharge.   Neck: Normal range of motion  Cardiovascular: Normal heart rate, Normal rhythm, No murmurs, No rubs, No gallops.   Thorax & Lungs: Normal breath sounds, No respiratory distress, No wheezing,  Abdomen: Bowel sounds normal, Soft, No tenderness   Skin: Warm, Dry, No erythema, No rash.   Back: No tenderness, No CVA tenderness.   Musculoskeletal: Good range of motion in all major joints.  Neurologic: Alert,  No focal deficits noted.   Psychiatric: Affect normal    Results for orders placed or performed during the hospital encounter of 12/07/21   CBC WITH DIFFERENTIAL   Result Value Ref Range    WBC 4.0 (L) 4.8 - 10.8 K/uL    RBC 3.96 (L) 4.20 - 5.40 M/uL    Hemoglobin 14.4 12.0 - 16.0 g/dL    Hematocrit 40.9 37.0 - 47.0 %    .3 (H) 81.4 - 97.8 fL    MCH 36.4 (H) 27.0 - 33.0 pg    MCHC 35.2 (H) 33.6 - 35.0 g/dL    RDW 52.0 (H) 35.9 - 50.0 fL    Platelet Count 43 (LL) 164 - 446 K/uL    MPV 12.0 9.0 - 12.9 fL    Neutrophils-Polys 56.90 44.00 - 72.00 %    Lymphocytes 24.70 22.00 - 41.00 " %    Monocytes 18.00 (H) 0.00 - 13.40 %    Eosinophils 0.20 0.00 - 6.90 %    Basophils 0.20 0.00 - 1.80 %    Immature Granulocytes 0.00 0.00 - 0.90 %    Nucleated RBC 0.00 /100 WBC    Neutrophils (Absolute) 2.28 2.00 - 7.15 K/uL    Lymphs (Absolute) 0.99 (L) 1.00 - 4.80 K/uL    Monos (Absolute) 0.72 0.00 - 0.85 K/uL    Eos (Absolute) 0.01 0.00 - 0.51 K/uL    Baso (Absolute) 0.01 0.00 - 0.12 K/uL    Immature Granulocytes (abs) 0.00 0.00 - 0.11 K/uL    NRBC (Absolute) 0.00 K/uL   COMP METABOLIC PANEL   Result Value Ref Range    Sodium 135 135 - 145 mmol/L    Potassium 3.5 (L) 3.6 - 5.5 mmol/L    Chloride 92 (L) 96 - 112 mmol/L    Co2 21 20 - 33 mmol/L    Anion Gap 22.0 (H) 7.0 - 16.0    Glucose 70 65 - 99 mg/dL    Bun 7 (L) 8 - 22 mg/dL    Creatinine 0.45 (L) 0.50 - 1.40 mg/dL    Calcium 9.5 8.4 - 10.2 mg/dL    AST(SGOT) 180 (H) 12 - 45 U/L    ALT(SGPT) 66 (H) 2 - 50 U/L    Alkaline Phosphatase 174 (H) 30 - 99 U/L    Total Bilirubin 2.6 (H) 0.1 - 1.5 mg/dL    Albumin 4.4 3.2 - 4.9 g/dL    Total Protein 7.8 6.0 - 8.2 g/dL    Globulin 3.4 1.9 - 3.5 g/dL    A-G Ratio 1.3 g/dL   LIPASE   Result Value Ref Range    Lipase 49 7 - 58 U/L   TROPONIN   Result Value Ref Range    Troponin T <6 6 - 19 ng/L   URINALYSIS CULTURE, IF INDICATED    Specimen: Urine   Result Value Ref Range    Color Yellow     Character Hazy (A)     Specific Gravity 1.025 <1.035    Ph 5.5 5.0 - 8.0    Glucose 100 (A) Negative mg/dL    Ketones >=80 (A) Negative mg/dL    Protein 30 (A) Negative mg/dL    Bilirubin Large (A) Negative    Nitrite Positive (A) Negative    Leukocyte Esterase Trace (A) Negative    Occult Blood Negative Negative    Micro Urine Req Microscopic    MAGNESIUM   Result Value Ref Range    Magnesium 1.5 1.5 - 2.5 mg/dL   HCG QUAL SERUM   Result Value Ref Range    Beta-Hcg Qualitative Serum Negative Negative   TSH WITH REFLEX TO FT4   Result Value Ref Range    TSH 1.260 0.380 - 5.330 uIU/mL   URINE MICROSCOPIC (W/UA)   Result Value Ref  Range    WBC 2-5 /hpf    RBC 0-2 /hpf    Bacteria Rare (A) None /hpf    Epithelial Cells Few Few /hpf    Hyaline Cast 0-2 /lpf   ESTIMATED GFR   Result Value Ref Range    GFR If African American >60 >60 mL/min/1.73 m 2    GFR If Non African American >60 >60 mL/min/1.73 m 2   IMMATURE PLT FRACTION   Result Value Ref Range    Imm. Plt Fraction 12.4 0.6 - 13.1 K/uL   DIFFERENTIAL COMMENT   Result Value Ref Range    Comments-Diff see below    EKG   Result Value Ref Range    Report       [facility].    Test Date:  2021  Pt Name:    LADARIUS DAY                  Department: [facility]  MRN:        [id number]                      Room:  Gender:     Female                       Technician:   :        1990                   Requested By:ER TRIAGE PROTOCOL  Order #:    [id number]                    Reading MD: KIRK YOUNG. [facility]    Measurements  Intervals                                Axis  Rate:       89                           P:          0  PA:         152                          QRS:        66  QRSD:       78                           T:          12  QT:         380  QTc:        463    Interpretive Statements  SINUS RHYTHM  Compared to ECG 2019 21:53:05  No significant changes  Electronically Signed On 2021 15:24:39 PST by KIRK YOUNG. [facility]          RADIOLOGY/PROCEDURES  CT-MAXILLOFACIAL W/O   Final Result      No evidence of facial fracture.      CT-HEAD W/O   Final Result      1.  No evidence of acute intracranial process.      2.  Cerebral atrophy as well as periventricular chronic small vessel ischemic change.         DX-CHEST-LIMITED (1 VIEW)   Final Result      No radiographic evidence of acute cardiopulmonary process.      US-RUQ   Final Result      Echogenic mildly enlarged liver, a nonspecific finding that often is found to represent steatosis.  Other infiltrative processes could have an identical appearance          COURSE & MEDICAL DECISION  MAKING  Pertinent Labs & Imaging studies reviewed. (See chart for details)    The patient presents emergency department for palpitations and tachycardia mostly last night.  She also states she is weakness in her arms and just generally does not feel well.  A broad differential diagnosis was considered in her chart is reviewed based on labs or previous work-up and comparison.    Patient seems to always be hypokalemic.  She does have some baseline elevated LFTs.  EKG was obtained this is nonspecific.  Placed on a cardiac monitor.  She is worked up with labs and imaging.  CBC shows a low white count and platelet count of 43,000.  As well as an elevated MCV.  She also has elevated LFTs and elevated bilirubin.  Ultrasound of the gallbladder liver obtained this shows fatty infiltration.    After the these findings is concerned about alcohol consumption and alcohol withdrawal.  I spoke with the patient about this and she states she drinks at least 7-8 shots a day maybe twice as many on the weekend.  Denies any drug use.  Denies having eating disorder as far as avoiding food or retching or purging.    Patient is also noted to have a UTI.  Because of the patient's palpitations and alcoholism I ordered x-ray exercise of a large dilated heart contributing to some palpitations or arrhythmia this is reassuring.  The patient wants to leave the hospital prior to completion of her evaluation she still getting a detox bag and she still getting worked up and she would like to leave AGAINST MEDICAL ADVICE.    Signing out AGAINST MEDICAL ADVICE  She did not want to wait to talk to her but I did tell her that arrange follow-up for her prescribed antibiotics for her UTI.  She is counseled that she requires further work-up and treatment in the emergency department because she has multiple lab abnormalities would not complete her therapy.  She understands this and verbalized understanding wants to leave.  She is signed out AGAINST MEDICAL  ADVICE.      While changing after being sent out and smoke advised the patient had a seizure.  She still in the room and not out of the computer.  I was called to see her at bedside.  She was postictal, her IV reestablished and she was given Ativan and placed on a cardiac monitor she is put in a position to protect her airway until she recovers.  Clearly postictal.    On reassessment she is noted have a contusion on her forehead and what appears to be an anterior dislocation of her mandible.  She sent over stat CTs.  Head CT is reassuring mandible is dislocated on CT.  This will be further discussed with the radiologist.  No fractures are identified.      The patient is requiring sedation.  She has a great deal of jaw pain.      Conscious Sedation Procedure Note    Indication: procedural pain management    Consent: I have discussed with the patient and/or the patient representative the indication, alternatives, and the possible risks and/or complications of the planned procedure and the anesthesia methods. The patient and/or patient representative appear to understand and agree to proceed.    Physician Involvement: The attending physician was present and supervising this procedure.    Pre-Sedation Documentation and Exam: I have personally completed a history, physical exam & review of systems for this patient (see notes).  Airway Assessment: Mallampati Class I - (soft palate, fauces, uvula & anterior/posterior tonsillar pillars are visible)  f3  Prior History of Anesthesia Complications: none    ASA Classification: Class 2 - A normal healthy patient with mild systemic disease    Sedation/ Anesthesia Plan: intravenous sedation    Medications Used: propofol intravenously    Monitoring and Safety: The patient was placed on a cardiac monitor and vital signs, pulse oximetry and level of consciousness were continuously evaluated throughout the procedure. The patient was closely monitored until recovery from the  medications was complete and the patient had returned to baseline status. Respiratory therapy was on standby at all times during the procedure.      (The following sections must be completed)  Post-Sedation Vital Signs: Vital signs were reviewed and were stable after the procedure (see flow sheet for vitals)            Intraservice Time: Greater than 10 minutes    Post-Sedation Exam: Lungs: clear           Complications: none    I provided both the sedation and procedure, a nurse was present at the bedside for the entire procedure.       Patient is sedated with propofol.  Mandible is reduced in typical fashion without complication.  Reassuring range of motion is appreciated when she wakes up.  She subjectively feels better.      Patient also has a UTI.  Ordered some Rocephin.  The patient is having alcohol withdrawal, she is having seizures and she is somewhat delirious.  I do not think she is a good candidate for outpatient management.  She requires hospitalization of discussed case with the hospitalist patient hospitalized for further work-up and treatment.      FINAL IMPRESSION  1. Palpitations     2. LFT elevation     3. Thrombocytopenia (HCC)     4. Seizure (HCC)     5. Closed head injury, initial encounter     6. Closed dislocation of jaw, initial encounter     7. Acute UTI         Addendum: patient counseled not to drive until cleared by doctor.  She was made aware that a DMV form was filled out.  She remains delirious it is unclear if she will understand this.  Seizure DMV form is completed.  There was an Accu-Chek obtained after procedure and while she remains delirious this is normal.    Patient continues to have hallucinations.  She is conversant but clearly delirious.  She requires hospitalization if additional Ativan is ordered.      Electronically signed by: Rafael Hart M.D., 12/7/2021 11:43 AM

## 2021-12-07 NOTE — ED NOTES
Patient back from CT scan.  Aware of plan of care.  Updated that son is safe and that Meliton has her son.

## 2021-12-08 LAB
ALBUMIN SERPL BCP-MCNC: 3.3 G/DL (ref 3.2–4.9)
ALBUMIN/GLOB SERPL: 1.4 G/DL
ALP SERPL-CCNC: 117 U/L (ref 30–99)
ALT SERPL-CCNC: 45 U/L (ref 2–50)
AMPHET UR QL SCN: NEGATIVE
ANION GAP SERPL CALC-SCNC: 12 MMOL/L (ref 7–16)
AST SERPL-CCNC: 106 U/L (ref 12–45)
BARBITURATES UR QL SCN: NEGATIVE
BASOPHILS # BLD AUTO: 0.2 % (ref 0–1.8)
BASOPHILS # BLD: 0.01 K/UL (ref 0–0.12)
BENZODIAZ UR QL SCN: NEGATIVE
BILIRUB SERPL-MCNC: 1.4 MG/DL (ref 0.1–1.5)
BUN SERPL-MCNC: 2 MG/DL (ref 8–22)
BZE UR QL SCN: NEGATIVE
CA-I SERPL-SCNC: 1.1 MMOL/L (ref 1.1–1.3)
CALCIUM SERPL-MCNC: 8 MG/DL (ref 8.4–10.2)
CANNABINOIDS UR QL SCN: NEGATIVE
CHLORIDE SERPL-SCNC: 111 MMOL/L (ref 96–112)
CO2 SERPL-SCNC: 21 MMOL/L (ref 20–33)
COMMENT 1642: NORMAL
CREAT SERPL-MCNC: 0.25 MG/DL (ref 0.5–1.4)
EOSINOPHIL # BLD AUTO: 0.06 K/UL (ref 0–0.51)
EOSINOPHIL NFR BLD: 1.3 % (ref 0–6.9)
ERYTHROCYTE [DISTWIDTH] IN BLOOD BY AUTOMATED COUNT: 53.8 FL (ref 35.9–50)
GLOBULIN SER CALC-MCNC: 2.3 G/DL (ref 1.9–3.5)
GLUCOSE SERPL-MCNC: 118 MG/DL (ref 65–99)
HCT VFR BLD AUTO: 35.1 % (ref 37–47)
HGB BLD-MCNC: 11.8 G/DL (ref 12–16)
IMM GRANULOCYTES # BLD AUTO: 0.01 K/UL (ref 0–0.11)
IMM GRANULOCYTES NFR BLD AUTO: 0.2 % (ref 0–0.9)
LYMPHOCYTES # BLD AUTO: 1.22 K/UL (ref 1–4.8)
LYMPHOCYTES NFR BLD: 26 % (ref 22–41)
MAGNESIUM SERPL-MCNC: 1.7 MG/DL (ref 1.5–2.5)
MCH RBC QN AUTO: 36.2 PG (ref 27–33)
MCHC RBC AUTO-ENTMCNC: 33.6 G/DL (ref 33.6–35)
MCV RBC AUTO: 107.7 FL (ref 81.4–97.8)
METHADONE UR QL SCN: NEGATIVE
MONOCYTES # BLD AUTO: 0.67 K/UL (ref 0–0.85)
MONOCYTES NFR BLD AUTO: 14.3 % (ref 0–13.4)
NEUTROPHILS # BLD AUTO: 2.73 K/UL (ref 2–7.15)
NEUTROPHILS NFR BLD: 58 % (ref 44–72)
NRBC # BLD AUTO: 0 K/UL
NRBC BLD-RTO: 0 /100 WBC
OPIATES UR QL SCN: NEGATIVE
OXYCODONE UR QL SCN: NEGATIVE
PCP UR QL SCN: NEGATIVE
PHOSPHATE SERPL-MCNC: 2.2 MG/DL (ref 2.5–4.5)
PLATELET # BLD AUTO: 41 K/UL (ref 164–446)
PLATELET BLD QL SMEAR: NORMAL
PLATELETS.RETICULATED NFR BLD AUTO: 14.8 K/UL (ref 0.6–13.1)
PMV BLD AUTO: 12.6 FL (ref 9–12.9)
POTASSIUM SERPL-SCNC: 3.3 MMOL/L (ref 3.6–5.5)
PROCALCITONIN SERPL-MCNC: 0.15 NG/ML
PROPOXYPH UR QL SCN: NEGATIVE
PROT SERPL-MCNC: 5.6 G/DL (ref 6–8.2)
RBC # BLD AUTO: 3.26 M/UL (ref 4.2–5.4)
RBC BLD AUTO: NORMAL
SODIUM SERPL-SCNC: 144 MMOL/L (ref 135–145)
WBC # BLD AUTO: 4.7 K/UL (ref 4.8–10.8)

## 2021-12-08 PROCEDURE — 99291 CRITICAL CARE FIRST HOUR: CPT | Performed by: INTERNAL MEDICINE

## 2021-12-08 PROCEDURE — 770022 HCHG ROOM/CARE - ICU (200)

## 2021-12-08 PROCEDURE — 700101 HCHG RX REV CODE 250: Performed by: INTERNAL MEDICINE

## 2021-12-08 PROCEDURE — 85025 COMPLETE CBC W/AUTO DIFF WBC: CPT

## 2021-12-08 PROCEDURE — 84100 ASSAY OF PHOSPHORUS: CPT

## 2021-12-08 PROCEDURE — 80307 DRUG TEST PRSMV CHEM ANLYZR: CPT

## 2021-12-08 PROCEDURE — 80053 COMPREHEN METABOLIC PANEL: CPT

## 2021-12-08 PROCEDURE — 84145 PROCALCITONIN (PCT): CPT

## 2021-12-08 PROCEDURE — 94760 N-INVAS EAR/PLS OXIMETRY 1: CPT

## 2021-12-08 PROCEDURE — 700105 HCHG RX REV CODE 258: Performed by: HOSPITALIST

## 2021-12-08 PROCEDURE — 700111 HCHG RX REV CODE 636 W/ 250 OVERRIDE (IP): Performed by: HOSPITALIST

## 2021-12-08 PROCEDURE — 85055 RETICULATED PLATELET ASSAY: CPT

## 2021-12-08 PROCEDURE — 700111 HCHG RX REV CODE 636 W/ 250 OVERRIDE (IP): Performed by: INTERNAL MEDICINE

## 2021-12-08 PROCEDURE — 700105 HCHG RX REV CODE 258: Performed by: INTERNAL MEDICINE

## 2021-12-08 PROCEDURE — 82330 ASSAY OF CALCIUM: CPT

## 2021-12-08 PROCEDURE — 700101 HCHG RX REV CODE 250

## 2021-12-08 PROCEDURE — 83735 ASSAY OF MAGNESIUM: CPT

## 2021-12-08 PROCEDURE — 700101 HCHG RX REV CODE 250: Performed by: HOSPITALIST

## 2021-12-08 RX ORDER — MAGNESIUM SULFATE HEPTAHYDRATE 40 MG/ML
2 INJECTION, SOLUTION INTRAVENOUS ONCE
Status: COMPLETED | OUTPATIENT
Start: 2021-12-08 | End: 2021-12-08

## 2021-12-08 RX ORDER — GAUZE BANDAGE 2" X 2"
300 BANDAGE TOPICAL 3 TIMES DAILY
Status: DISCONTINUED | OUTPATIENT
Start: 2021-12-08 | End: 2021-12-10 | Stop reason: HOSPADM

## 2021-12-08 RX ORDER — FOLIC ACID 1 MG/1
1 TABLET ORAL DAILY
Status: DISCONTINUED | OUTPATIENT
Start: 2021-12-09 | End: 2021-12-10 | Stop reason: HOSPADM

## 2021-12-08 RX ORDER — LORAZEPAM 2 MG/ML
2 INJECTION INTRAMUSCULAR ONCE
Status: COMPLETED | OUTPATIENT
Start: 2021-12-08 | End: 2021-12-08

## 2021-12-08 RX ORDER — POTASSIUM CHLORIDE 20 MEQ/1
40 TABLET, EXTENDED RELEASE ORAL ONCE
Status: DISCONTINUED | OUTPATIENT
Start: 2021-12-08 | End: 2021-12-08

## 2021-12-08 RX ORDER — DEXMEDETOMIDINE HYDROCHLORIDE 100 UG/ML
INJECTION, SOLUTION INTRAVENOUS
Status: COMPLETED
Start: 2021-12-08 | End: 2021-12-08

## 2021-12-08 RX ADMIN — LORAZEPAM 2 MG: 2 INJECTION INTRAMUSCULAR; INTRAVENOUS at 19:55

## 2021-12-08 RX ADMIN — LORAZEPAM 2 MG: 2 INJECTION INTRAMUSCULAR; INTRAVENOUS at 02:00

## 2021-12-08 RX ADMIN — LORAZEPAM 2 MG: 2 INJECTION INTRAMUSCULAR; INTRAVENOUS at 02:42

## 2021-12-08 RX ADMIN — LORAZEPAM 1.5 MG: 2 INJECTION INTRAMUSCULAR; INTRAVENOUS at 01:34

## 2021-12-08 RX ADMIN — MAGNESIUM SULFATE HEPTAHYDRATE 2 G: 2 INJECTION, SOLUTION INTRAVENOUS at 10:59

## 2021-12-08 RX ADMIN — DEXMEDETOMIDINE 0.2 MCG/KG/HR: 200 INJECTION, SOLUTION INTRAVENOUS at 04:43

## 2021-12-08 RX ADMIN — LORAZEPAM 2 MG: 2 INJECTION INTRAMUSCULAR; INTRAVENOUS at 03:30

## 2021-12-08 RX ADMIN — LORAZEPAM 2 MG: 2 INJECTION INTRAMUSCULAR; INTRAVENOUS at 04:22

## 2021-12-08 RX ADMIN — LORAZEPAM 2 MG: 2 INJECTION INTRAMUSCULAR; INTRAVENOUS at 04:37

## 2021-12-08 RX ADMIN — POTASSIUM CHLORIDE AND SODIUM CHLORIDE: 900; 150 INJECTION, SOLUTION INTRAVENOUS at 23:00

## 2021-12-08 RX ADMIN — DEXMEDETOMIDINE HYDROCHLORIDE 200 MCG: 100 INJECTION, SOLUTION INTRAVENOUS at 04:40

## 2021-12-08 RX ADMIN — CEFTRIAXONE SODIUM 1 G: 1 INJECTION, POWDER, FOR SOLUTION INTRAMUSCULAR; INTRAVENOUS at 06:09

## 2021-12-08 RX ADMIN — LORAZEPAM 1.5 MG: 2 INJECTION INTRAMUSCULAR; INTRAVENOUS at 00:03

## 2021-12-08 RX ADMIN — POTASSIUM PHOSPHATE, MONOBASIC POTASSIUM PHOSPHATE, DIBASIC 15 MMOL: 224; 236 INJECTION, SOLUTION, CONCENTRATE INTRAVENOUS at 11:59

## 2021-12-08 RX ADMIN — LORAZEPAM 2 MG: 2 INJECTION INTRAMUSCULAR; INTRAVENOUS at 03:13

## 2021-12-08 RX ADMIN — LORAZEPAM 2 MG: 2 INJECTION INTRAMUSCULAR; INTRAVENOUS at 02:57

## 2021-12-08 RX ADMIN — LORAZEPAM 1 MG: 2 INJECTION INTRAMUSCULAR; INTRAVENOUS at 01:19

## 2021-12-08 RX ADMIN — DEXMEDETOMIDINE 0.6 MCG/KG/HR: 200 INJECTION, SOLUTION INTRAVENOUS at 18:15

## 2021-12-08 RX ADMIN — LORAZEPAM 2 MG: 2 INJECTION INTRAMUSCULAR; INTRAVENOUS at 03:45

## 2021-12-08 RX ADMIN — LORAZEPAM 2 MG: 2 INJECTION INTRAMUSCULAR; INTRAVENOUS at 04:02

## 2021-12-08 RX ADMIN — LORAZEPAM 2 MG: 2 INJECTION INTRAMUSCULAR; INTRAVENOUS at 02:28

## 2021-12-08 RX ADMIN — POTASSIUM CHLORIDE AND SODIUM CHLORIDE: 900; 150 INJECTION, SOLUTION INTRAVENOUS at 10:29

## 2021-12-08 RX ADMIN — LORAZEPAM 1.5 MG: 2 INJECTION INTRAMUSCULAR; INTRAVENOUS at 01:50

## 2021-12-08 RX ADMIN — LORAZEPAM 2 MG: 2 INJECTION INTRAMUSCULAR; INTRAVENOUS at 02:17

## 2021-12-08 ASSESSMENT — LIFESTYLE VARIABLES
VISUAL DISTURBANCES: MODERATELY SEVERE HALLUCINATIONS
HEADACHE, FULLNESS IN HEAD: MILD
NAUSEA AND VOMITING: NO NAUSEA AND NO VOMITING
AGITATION: *
NAUSEA AND VOMITING: NO NAUSEA AND NO VOMITING
AUDITORY DISTURBANCES: NOT PRESENT
AGITATION: *
HEADACHE, FULLNESS IN HEAD: NOT PRESENT
ANXIETY: *
VISUAL DISTURBANCES: MODERATELY SEVERE HALLUCINATIONS
TREMOR: *
PAROXYSMAL SWEATS: *
NAUSEA AND VOMITING: NO NAUSEA AND NO VOMITING
HEADACHE, FULLNESS IN HEAD: NOT PRESENT
TOTAL SCORE: 26
PAROXYSMAL SWEATS: *
AGITATION: *
PAROXYSMAL SWEATS: BARELY PERCEPTIBLE SWEATING. PALMS MOIST
ORIENTATION AND CLOUDING OF SENSORIUM: DATE DISORIENTATION BY NO MORE THAN TWO CALENDAR DAYS
PAROXYSMAL SWEATS: *
AGITATION: *
ORIENTATION AND CLOUDING OF SENSORIUM: DISORIENTED FOR PLACE AND / OR PERSON
PAROXYSMAL SWEATS: *
ORIENTATION AND CLOUDING OF SENSORIUM: DATE DISORIENTATION BY NO MORE THAN TWO CALENDAR DAYS
ANXIETY: *
TOTAL SCORE: 15
ANXIETY: NO ANXIETY (AT EASE)
HEADACHE, FULLNESS IN HEAD: NOT PRESENT
TREMOR: MODERATE TREMOR WITH ARMS EXTENDED
HEADACHE, FULLNESS IN HEAD: MILD
NAUSEA AND VOMITING: NO NAUSEA AND NO VOMITING
AUDITORY DISTURBANCES: NOT PRESENT
ORIENTATION AND CLOUDING OF SENSORIUM: DISORIENTED FOR PLACE AND / OR PERSON
ORIENTATION AND CLOUDING OF SENSORIUM: DATE DISORIENTATION BY NO MORE THAN TWO CALENDAR DAYS
AUDITORY DISTURBANCES: NOT PRESENT
TREMOR: MODERATE TREMOR WITH ARMS EXTENDED
TOTAL SCORE: 22
ORIENTATION AND CLOUDING OF SENSORIUM: DATE DISORIENTATION BY NO MORE THAN TWO CALENDAR DAYS
NAUSEA AND VOMITING: NO NAUSEA AND NO VOMITING
HEADACHE, FULLNESS IN HEAD: MILD
NAUSEA AND VOMITING: NO NAUSEA AND NO VOMITING
CONSUMPTION TOTAL: INCOMPLETE
AUDITORY DISTURBANCES: NOT PRESENT
HEADACHE, FULLNESS IN HEAD: MILD
AUDITORY DISTURBANCES: VERY MILD HARSHNESS OR ABILITY TO FRIGHTEN
TOTAL SCORE: 4
NAUSEA AND VOMITING: NO NAUSEA AND NO VOMITING
TOTAL SCORE: 11
ANXIETY: *
TREMOR: MODERATE TREMOR WITH ARMS EXTENDED
TREMOR: *
TREMOR: *
AVERAGE NUMBER OF DAYS PER WEEK YOU HAVE A DRINK CONTAINING ALCOHOL: 7
NAUSEA AND VOMITING: NO NAUSEA AND NO VOMITING
HEADACHE, FULLNESS IN HEAD: MILD
ANXIETY: MODERATELY ANXIOUS OR GUARDED, SO ANXIETY IS INFERRED
PAROXYSMAL SWEATS: *
HEADACHE, FULLNESS IN HEAD: NOT PRESENT
TREMOR: MODERATE TREMOR WITH ARMS EXTENDED
ORIENTATION AND CLOUDING OF SENSORIUM: DATE DISORIENTATION BY NO MORE THAN TWO CALENDAR DAYS
HEADACHE, FULLNESS IN HEAD: MODERATE
HEADACHE, FULLNESS IN HEAD: MILD
TOTAL SCORE: 10
TOTAL SCORE: 26
ORIENTATION AND CLOUDING OF SENSORIUM: DATE DISORIENTATION BY NO MORE THAN TWO CALENDAR DAYS
AUDITORY DISTURBANCES: NOT PRESENT
TOTAL SCORE: 26
ORIENTATION AND CLOUDING OF SENSORIUM: DISORIENTED FOR PLACE AND / OR PERSON
TREMOR: NO TREMOR
AUDITORY DISTURBANCES: NOT PRESENT
TREMOR: *
TOTAL SCORE: 17
ANXIETY: MODERATELY ANXIOUS OR GUARDED, SO ANXIETY IS INFERRED
TOTAL SCORE: 11
PAROXYSMAL SWEATS: *
AGITATION: SOMEWHAT MORE THAN NORMAL ACTIVITY
NAUSEA AND VOMITING: NO NAUSEA AND NO VOMITING
PAROXYSMAL SWEATS: NO SWEAT VISIBLE
NAUSEA AND VOMITING: NO NAUSEA AND NO VOMITING
ANXIETY: MODERATELY ANXIOUS OR GUARDED, SO ANXIETY IS INFERRED
AUDITORY DISTURBANCES: NOT PRESENT
TOTAL SCORE: 6
VISUAL DISTURBANCES: VERY MILD SENSITIVITY
VISUAL DISTURBANCES: MODERATELY SEVERE HALLUCINATIONS
ORIENTATION AND CLOUDING OF SENSORIUM: DATE DISORIENTATION BY NO MORE THAN TWO CALENDAR DAYS
AGITATION: SOMEWHAT MORE THAN NORMAL ACTIVITY
AUDITORY DISTURBANCES: NOT PRESENT
VISUAL DISTURBANCES: MODERATE SENSITIVITY
AUDITORY DISTURBANCES: NOT PRESENT
AGITATION: *
VISUAL DISTURBANCES: NOT PRESENT
ANXIETY: *
HEADACHE, FULLNESS IN HEAD: MILD
ORIENTATION AND CLOUDING OF SENSORIUM: DATE DISORIENTATION BY NO MORE THAN TWO CALENDAR DAYS
PAROXYSMAL SWEATS: NO SWEAT VISIBLE
TREMOR: *
ANXIETY: MILDLY ANXIOUS
TREMOR: *
AGITATION: SOMEWHAT MORE THAN NORMAL ACTIVITY
ANXIETY: NO ANXIETY (AT EASE)
NAUSEA AND VOMITING: NO NAUSEA AND NO VOMITING
PAROXYSMAL SWEATS: *
ORIENTATION AND CLOUDING OF SENSORIUM: DATE DISORIENTATION BY NO MORE THAN TWO CALENDAR DAYS
ORIENTATION AND CLOUDING OF SENSORIUM: DISORIENTED FOR PLACE AND / OR PERSON
ORIENTATION AND CLOUDING OF SENSORIUM: DISORIENTED FOR PLACE AND / OR PERSON
VISUAL DISTURBANCES: NOT PRESENT
ORIENTATION AND CLOUDING OF SENSORIUM: DATE DISORIENTATION BY NO MORE THAN TWO CALENDAR DAYS
AUDITORY DISTURBANCES: VERY MILD HARSHNESS OR ABILITY TO FRIGHTEN
VISUAL DISTURBANCES: MODERATELY SEVERE HALLUCINATIONS
ANXIETY: *
VISUAL DISTURBANCES: MILD SENSITIVITY
AGITATION: *
AUDITORY DISTURBANCES: VERY MILD HARSHNESS OR ABILITY TO FRIGHTEN
PAROXYSMAL SWEATS: *
TOTAL SCORE: 22
HEADACHE, FULLNESS IN HEAD: MILD
HEADACHE, FULLNESS IN HEAD: NOT PRESENT
VISUAL DISTURBANCES: MODERATE SENSITIVITY
AUDITORY DISTURBANCES: NOT PRESENT
HEADACHE, FULLNESS IN HEAD: NOT PRESENT
NAUSEA AND VOMITING: NO NAUSEA AND NO VOMITING
ORIENTATION AND CLOUDING OF SENSORIUM: DISORIENTED FOR PLACE AND / OR PERSON
VISUAL DISTURBANCES: MODERATELY SEVERE HALLUCINATIONS
TOTAL SCORE: 28
AGITATION: *
ORIENTATION AND CLOUDING OF SENSORIUM: DISORIENTED FOR PLACE AND / OR PERSON
TOTAL SCORE: 14
PAROXYSMAL SWEATS: NO SWEAT VISIBLE
TREMOR: MODERATE TREMOR WITH ARMS EXTENDED
VISUAL DISTURBANCES: VERY MILD SENSITIVITY
ANXIETY: *
NAUSEA AND VOMITING: NO NAUSEA AND NO VOMITING
PAROXYSMAL SWEATS: NO SWEAT VISIBLE
TREMOR: *
TOTAL SCORE: 26
VISUAL DISTURBANCES: VERY MILD SENSITIVITY
PAROXYSMAL SWEATS: NO SWEAT VISIBLE
AUDITORY DISTURBANCES: MILD HARSHNESS OR ABILITY TO FRIGHTEN
TREMOR: *
TOTAL SCORE: 13
ORIENTATION AND CLOUDING OF SENSORIUM: DATE DISORIENTATION BY MORE THAN TWO CALENDAR DAYS
AGITATION: *
VISUAL DISTURBANCES: MODERATELY SEVERE HALLUCINATIONS
AUDITORY DISTURBANCES: NOT PRESENT
PAROXYSMAL SWEATS: *
HEADACHE, FULLNESS IN HEAD: NOT PRESENT
TREMOR: TREMOR NOT VISIBLE BUT CAN BE FELT, FINGERTIP TO FINGERTIP
AGITATION: *
TOTAL SCORE: 26
AGITATION: NORMAL ACTIVITY
HEADACHE, FULLNESS IN HEAD: NOT PRESENT
PAROXYSMAL SWEATS: NO SWEAT VISIBLE
ANXIETY: *
VISUAL DISTURBANCES: MODERATE SENSITIVITY
NAUSEA AND VOMITING: NO NAUSEA AND NO VOMITING
AUDITORY DISTURBANCES: NOT PRESENT
TOTAL SCORE: 26
PAROXYSMAL SWEATS: NO SWEAT VISIBLE
TOTAL SCORE: 20
ANXIETY: *
ORIENTATION AND CLOUDING OF SENSORIUM: DISORIENTED FOR PLACE AND / OR PERSON
ANXIETY: *
NAUSEA AND VOMITING: NO NAUSEA AND NO VOMITING
HEADACHE, FULLNESS IN HEAD: MODERATE
AGITATION: *
TREMOR: NO TREMOR
ON A TYPICAL DAY WHEN YOU DRINK ALCOHOL HOW MANY DRINKS DO YOU HAVE: 8
AUDITORY DISTURBANCES: NOT PRESENT
ORIENTATION AND CLOUDING OF SENSORIUM: DISORIENTED FOR PLACE AND / OR PERSON
ANXIETY: *
AGITATION: *
VISUAL DISTURBANCES: NOT PRESENT
ANXIETY: *
AUDITORY DISTURBANCES: VERY MILD HARSHNESS OR ABILITY TO FRIGHTEN
AGITATION: *
TREMOR: *
TOTAL SCORE: 26
VISUAL DISTURBANCES: MODERATELY SEVERE HALLUCINATIONS
AGITATION: *
AUDITORY DISTURBANCES: NOT PRESENT
AGITATION: *
PAROXYSMAL SWEATS: NO SWEAT VISIBLE
HEADACHE, FULLNESS IN HEAD: NOT PRESENT
TREMOR: *
TOTAL SCORE: 22
HEADACHE, FULLNESS IN HEAD: NOT PRESENT
AUDITORY DISTURBANCES: MILD HARSHNESS OR ABILITY TO FRIGHTEN
AGITATION: MODERATELY FIDGETY AND RESTLESS
VISUAL DISTURBANCES: MODERATE SENSITIVITY
VISUAL DISTURBANCES: VERY MILD SENSITIVITY
TOTAL SCORE: 26
HEADACHE, FULLNESS IN HEAD: MODERATE
NAUSEA AND VOMITING: NO NAUSEA AND NO VOMITING
HEADACHE, FULLNESS IN HEAD: MODERATE
ANXIETY: *
ORIENTATION AND CLOUDING OF SENSORIUM: DATE DISORIENTATION BY NO MORE THAN TWO CALENDAR DAYS
VISUAL DISTURBANCES: VERY MILD SENSITIVITY
AUDITORY DISTURBANCES: NOT PRESENT
TREMOR: *
AUDITORY DISTURBANCES: NOT PRESENT
AGITATION: SOMEWHAT MORE THAN NORMAL ACTIVITY
TOTAL SCORE: 26
NAUSEA AND VOMITING: NO NAUSEA AND NO VOMITING
NAUSEA AND VOMITING: NO NAUSEA AND NO VOMITING
PAROXYSMAL SWEATS: NO SWEAT VISIBLE
NAUSEA AND VOMITING: NO NAUSEA AND NO VOMITING
ORIENTATION AND CLOUDING OF SENSORIUM: DATE DISORIENTATION BY NO MORE THAN TWO CALENDAR DAYS
ANXIETY: MILDLY ANXIOUS
NAUSEA AND VOMITING: NO NAUSEA AND NO VOMITING
NAUSEA AND VOMITING: NO NAUSEA AND NO VOMITING
ORIENTATION AND CLOUDING OF SENSORIUM: DATE DISORIENTATION BY NO MORE THAN TWO CALENDAR DAYS
ANXIETY: MILDLY ANXIOUS
VISUAL DISTURBANCES: MODERATELY SEVERE HALLUCINATIONS
HEADACHE, FULLNESS IN HEAD: MODERATE
VISUAL DISTURBANCES: MILD SENSITIVITY
TREMOR: MODERATE TREMOR WITH ARMS EXTENDED
TREMOR: *
NAUSEA AND VOMITING: NO NAUSEA AND NO VOMITING
ANXIETY: *
PAROXYSMAL SWEATS: NO SWEAT VISIBLE
TREMOR: *
AGITATION: SOMEWHAT MORE THAN NORMAL ACTIVITY
AGITATION: *
PAROXYSMAL SWEATS: NO SWEAT VISIBLE
PAROXYSMAL SWEATS: NO SWEAT VISIBLE
AUDITORY DISTURBANCES: VERY MILD HARSHNESS OR ABILITY TO FRIGHTEN
NAUSEA AND VOMITING: NO NAUSEA AND NO VOMITING
AGITATION: *
VISUAL DISTURBANCES: MODERATELY SEVERE HALLUCINATIONS
PAROXYSMAL SWEATS: NO SWEAT VISIBLE
ANXIETY: *
ANXIETY: MODERATELY ANXIOUS OR GUARDED, SO ANXIETY IS INFERRED
TREMOR: *

## 2021-12-08 ASSESSMENT — PAIN DESCRIPTION - PAIN TYPE
TYPE: ACUTE PAIN

## 2021-12-08 ASSESSMENT — COGNITIVE AND FUNCTIONAL STATUS - GENERAL
DAILY ACTIVITIY SCORE: 12
DRESSING REGULAR LOWER BODY CLOTHING: A LOT
EATING MEALS: A LOT
TURNING FROM BACK TO SIDE WHILE IN FLAT BAD: A LOT
MOBILITY SCORE: 12
SUGGESTED CMS G CODE MODIFIER DAILY ACTIVITY: CL
CLIMB 3 TO 5 STEPS WITH RAILING: A LOT
HELP NEEDED FOR BATHING: A LOT
DRESSING REGULAR UPPER BODY CLOTHING: A LOT
MOVING TO AND FROM BED TO CHAIR: A LOT
TOILETING: A LOT
SUGGESTED CMS G CODE MODIFIER MOBILITY: CL
WALKING IN HOSPITAL ROOM: A LOT
MOVING FROM LYING ON BACK TO SITTING ON SIDE OF FLAT BED: A LOT
STANDING UP FROM CHAIR USING ARMS: A LOT
PERSONAL GROOMING: A LOT

## 2021-12-08 ASSESSMENT — FIBROSIS 4 INDEX: FIB4 SCORE: 15.46

## 2021-12-08 NOTE — ED NOTES
RN was sitting with patient and patient was looking into the core insisting that I bring back her boyfriend, she was pointing to a ER DR.  RN re oriented patient.

## 2021-12-08 NOTE — ED NOTES
While called in to assist patient, she stated she saw a squirrel running around her room. RN notified

## 2021-12-08 NOTE — PROGRESS NOTES
Patient very agitated at this time, high fall risk and continually trying to get out of bed, pulling telemetry box and leads off, trying to remove IV lines, having auditory and visual hallucinations, grabbing at non-existent objects. Continually attempted to re-orient patient, CIWA protocol in place, attempted to place a sitter with patient, unable to re-direct the patient. Dr. Dennis notified, orders placed for bilateral wrist non-violent restraints. Attempted to call Meliton (significant other listed in the chart), phone doesn't seem to be working.

## 2021-12-08 NOTE — PROGRESS NOTES
Pt transferred from floor. Agitated and pulling on restraints. Yelling for people to leave her room with visual hallucinations. Pt placed on monitor, vitals obtained, precedex gtt started and titrated within parameters. Safety precautions in place, hourly rounding in effect.

## 2021-12-08 NOTE — CARE PLAN
The patient is Watcher - Medium risk of patient condition declining or worsening    Shift Goals  Clinical Goals: maintain RASS at 0  Patient Goals: MARIA    Progress made toward(s) clinical / shift goals:      Problem: Risk for Aspiration  Goal: Patient's risk for aspiration will be absent or decrease  Outcome: Progressing  HOB > 30 degrees.      Problem: Fall Risk  Goal: Patient will remain free from falls  Outcome: Progressing  Bed alarm on and bed locked in lowest position.        Patient is not progressing towards the following goals:

## 2021-12-08 NOTE — PROGRESS NOTES
Pt disorientated when asked various questions and forgetful when re-orientated. Dr. Choi notified of morning lab values and low temperature, no new orders given to RN at this time. Pt boyfriend at bedside during shift and able to answer some questions regarding pt history. Pt boyfriend also took pt car keys home with him.

## 2021-12-08 NOTE — ED NOTES
Patients blood sugar 135. Patient can answer orientation questions correctly, but is still having hallucinations, but can now be re-directed.

## 2021-12-08 NOTE — PROGRESS NOTES
[place]. Ambulated from gurney to bed. PT A&Ox4. Tele monitor applied, vitals taken. Unable to obtain history, allergies and med rec at this time.     Pt oriented to unit and POC discussed, including ETOH withdrawal. Welcome folder provided and discussed. Communication board filled out. Pt instructed to call light usage and encouraged to call for any questions, needs, or concerns and prior to getting out of bed. Fall precautions in place. Pt agrees with the plan and declines any needs at this time. Bed locked and low and bed alarm on.

## 2021-12-08 NOTE — ED NOTES
Patient up to ambulate to bathroom. Patient unable to void. Once back to the room, patient agitated, and making attempts to get out of bed, and remove equipment.

## 2021-12-08 NOTE — ED NOTES
6959 time out called for conscious sedation all parties in the room agree with plan of care.  RN x 2, respiratory, and MD at bedside

## 2021-12-08 NOTE — DIETARY
"Nutrition services: Day 1 of admit.  Nichol Chaney is a 31 y.o. female with admitting DX of Alcohol dependence with withdrawal    Consult received for low BMI      Assessment:  Height: 170.2 cm (5' 7\")  Weight: 52 kg (114 lb 10.2 oz)(bed scale)  Body mass index is 17.96 kg/m²., BMI classification: underweight  Diet/Intake: NPO    Evaluation:   1. Pt presented with loss of appetite x 2 days and rapid heart beat. Pt with hx of alcohol dependence. She stopped driking 2 days ago. In ED, pt found with jaw dislocation which was reduced. Pt is experiencing visual hallucinations. CIWA protocol in place.  2. Pt's admit weight was 46.3 kg on standing scale with BMI noted of 15.99. This weight is most likely accurate. Based on pt's hx of alcohol dependence and report of heavy drinking, pt may be malnourished. Based on pt's current withdrawal status and report of hallucinations, RD visit is not appropriate.   3. Pt with limited wt hx in Epic. Pt weighed 49.3 kg on 11/16/21. In comparison to admit weight of 46.3 kg, wt loss noted of 3 kg (6.1%) x 3 weeks. Weight loss is significant.   4. Labs: 12/7/21: potassium=3.5 (L), magnesium=1.5  5. Meds: thiamine, MVI, folic acid, Precedex, 0.9% NaCl with KCl 20 mEq @ 83 mL/hour  6. Pt may be at risk for refeeding. When diet initiated, pt may benefit from daily phos/mag/potassium labs x 7 days and continuation of thiamine supplementation x 7 days.     Malnutrition Risk: unable to determine. Risk noted due to alcohol abuse, recent wt loss, report of poor PO and low BMI.    Recommendations/Plan:  1. Initiate diet and advance beyond clears when medically feasible.   2. After initiation of diet, daily refeeding labs x 7 days and continuation of thiamine supplementation x 7 days.    3. After diet initiated, encourage intake of >50%  4. Document intake of all meals as % taken in ADL's to provide interdisciplinary communication across all shifts.   5. Monitor weight.  6. After diet advanced " beyond clears, nutrition rep will continue to see patient for ongoing meal and snack preferences.     RD will follow.

## 2021-12-08 NOTE — ASSESSMENT & PLAN NOTE
Complicated with hallucinations and seizures  [facility] protocol   Monitor electrolytes and replace accordingly, particularly magnesium, potassium and phosphorus  Fall, seizure, aspiration precautions.  Thiamine folic acid and multivitamin.  Counseling was mentally clear.

## 2021-12-08 NOTE — ED NOTES
Meliton Tineo patients boyfriend [phone] called and checked on patient.  Updated on plan of care

## 2021-12-08 NOTE — FLOWSHEET NOTE
12/07/21 1605   Events/Summary/Plan   Events/Summary/Plan Conscious sedation.   Vital Signs   Pulse 76   Respiration 20   Pulse Oximetry 98 %   $ Pulse Oximetry (Spot Check) Yes   CO2 Monitoring   ETCO2 (mmHg) 25   $ ETCO2 Monitoring Yes   Chest Exam   Work Of Breathing / Effort Within Normal Limits   Oxygen   O2 (LPM) 4   O2 Delivery Device Silicone Nasal Cannula

## 2021-12-08 NOTE — ASSESSMENT & PLAN NOTE
Started on ceftriaxone in the emergency room.  Continue for now follow on cultures and sensitivities

## 2021-12-08 NOTE — PROGRESS NOTES
Dr. Miller made aware of patient's CIWA scores over 20 and currently at 26, continue with current plan at this time.

## 2021-12-08 NOTE — PROGRESS NOTES
Telemetry Shift Summary     Rhythm: SR/ST  HR:   Ectopy: r PAC    Measurements: 0.14/0.08/0.36    Normal Values  Rhythm: SR  HR:   Measurements: 0.12-0.20/0.08-0.10/0.30-0.52

## 2021-12-08 NOTE — H&P
"Hospital Medicine History & Physical Note    Date of Service  12/7/2021    Primary Care Physician  No primary care provider on file.    Consultants  None      Code Status  Full Code    Chief Complaint  Chief Complaint   Patient presents with   • Loss of Appetite     x 2 days   • Rapid Heart Beat     Pt states is unabel to sleep r/t \"keep waking up w/ my heart pounding\"     History of Presenting Illness  Nichol Chaney is a 31 y.o. female with a past medical history of alcohol dependence who presented 12/7/2021 with generalized weakness and palpitations.  Patient reports that she is a heavy drinker but stopped drinking alcohol 2 days ago.  She reports that she has been having poor appetite and difficulty to sleep.  She also reports having intermittent episodes of palpitations without chest pain or shortness of breath.  In the emergency room she experienced a seizure.  She was also found to have jaw dislocation that was reduced by the emergency physician.  Patient also has been experiencing visual hallucinations.    I discussed the plan of care with emergency department physician, and the patient    Review of Systems  Review of Systems   Unable to perform ROS: Mental status change (Limited)   Constitutional: Positive for malaise/fatigue.   Eyes: Negative for discharge and redness.   Respiratory: Negative for cough, shortness of breath and stridor.    Cardiovascular: Positive for palpitations. Negative for chest pain and leg swelling.   Gastrointestinal:        Anorexia   Genitourinary: Negative for flank pain.   Musculoskeletal: Negative for myalgias.   Skin: Negative.    Neurological: Positive for seizures.   Endo/Heme/Allergies: Does not bruise/bleed easily.   Psychiatric/Behavioral: Positive for hallucinations. The patient is nervous/anxious.      Past Medical History   has a past medical history of History of DVT (deep vein thrombosis) and Tobacco use.    Surgical History  No prior surgeries    Family " History  family history includes Cancer in her maternal grandfather, maternal grandmother, and mother; No Known Problems in her brother, father, and sister; Stroke in her maternal grandmother.     Social History   reports that she has been smoking cigarettes. She started smoking about 3 years ago. She has a 2.50 pack-year smoking history. She has never used smokeless tobacco. She reports current alcohol use of about 3.6 oz of alcohol per week. She reports that she does not use drugs.    Allergies  Allergies   Allergen Reactions   • Other Food      seafood     Medications  Prior to Admission Medications   Prescriptions Last Dose Informant Patient Reported? Taking?   ibuprofen (MOTRIN) 200 MG Tab 12/6/2021 at Noon Patient Yes Yes   Sig: Take 200 mg by mouth every 6 hours as needed.      Facility-Administered Medications: None     Physical Exam  Temp:  [36.3 °C (97.4 °F)-37.1 °C (98.8 °F)] 37.1 °C (98.8 °F)  Pulse:  [] 82  Resp:  [13-65] 16  BP: ()/(59-95) 104/65  SpO2:  [92 %-100 %] 98 %  Blood Pressure: 104/69   Temperature: 36.3 °C (97.4 °F)   Pulse: 80   Respiration: 19   Pulse Oximetry: 92 %     Physical Exam  Constitutional:       General: She is not in acute distress.  HENT:      Head: Normocephalic and atraumatic.      Right Ear: External ear normal.      Left Ear: External ear normal.      Nose: No congestion or rhinorrhea.      Mouth/Throat:      Mouth: Mucous membranes are dry.      Pharynx: No oropharyngeal exudate or posterior oropharyngeal erythema.   Eyes:      General: No scleral icterus.        Right eye: No discharge.         Left eye: No discharge.      Conjunctiva/sclera: Conjunctivae normal.      Pupils: Pupils are equal, round, and reactive to light.   Cardiovascular:      Rate and Rhythm: Tachycardia present.      Heart sounds: No friction rub. No gallop.    Pulmonary:      Effort: Pulmonary effort is normal.      Comments: Tachypneic.  Saturating well on 2 L of oxygen nasal  cannula  Abdominal:      General: Abdomen is flat. There is no distension.      Tenderness: There is no guarding.   Musculoskeletal:         General: No swelling.      Cervical back: Neck supple. No rigidity. No muscular tenderness.      Right lower leg: No edema.      Left lower leg: No edema.   Skin:     General: Skin is dry.      Capillary Refill: Capillary refill takes 2 to 3 seconds.      Coloration: Skin is pale. Skin is not jaundiced.      Findings: No bruising or erythema.   Neurological:      Mental Status: She is alert.      Coordination: Coordination abnormal.      Comments: Intermittently and variably oriented x2-x4.  Experiencing visual hallucinations   Psychiatric:      Comments: Anxious mood       Laboratory:  Recent Labs     12/07/21  1200   WBC 4.0*   RBC 3.96*   HEMOGLOBIN 14.4   HEMATOCRIT 40.9   .3*   MCH 36.4*   MCHC 35.2*   RDW 52.0*   PLATELETCT 43*   MPV 12.0     Recent Labs     12/07/21  1200   SODIUM 135   POTASSIUM 3.5*   CHLORIDE 92*   CO2 21   GLUCOSE 70   BUN 7*   CREATININE 0.45*   CALCIUM 9.5     Recent Labs     12/07/21  1200   ALTSGPT 66*   ASTSGOT 180*   ALKPHOSPHAT 174*   TBILIRUBIN 2.6*   LIPASE 49   GLUCOSE 70         No results for input(s): NTPROBNP in the last 72 hours.      Recent Labs     12/07/21  1200   TROPONINT <6       Imaging:  CT-MAXILLOFACIAL W/O   Final Result   Addendum 1 of 1   There is bicondylar temporomandibular joint dislocation anteriorly. No    evidence of fracture seen. These findings were discussed with Dr. Hart    at 4:51 PM on 12/7/2021.      Final      No evidence of facial fracture.      CT-HEAD W/O   Final Result      1.  No evidence of acute intracranial process.      2.  Cerebral atrophy as well as periventricular chronic small vessel ischemic change.         DX-CHEST-LIMITED (1 VIEW)   Final Result      No radiographic evidence of acute cardiopulmonary process.      US-RUQ   Final Result      Echogenic mildly enlarged liver, a  nonspecific finding that often is found to represent steatosis.  Other infiltrative processes could have an identical appearance        I personally reviewed patient EKG it shows sinus rhythm with a rate of 80, no ST elevation or ST depression.  QTc 480.    Assessment/Plan:  I anticipate this patient will require at least two midnights for appropriate medical management, necessitating inpatient admission.    * Acute encephalopathy- (present on admission)  Assessment & Plan  Multifactorial, toxic metabolic likely secondary to alcohol withdrawal, delirium and alcohol withdrawal seizures  CIWA protocol, rehydration, monitor and replace electrolytes  Consider further diagnostic testing /imaging if encephalopathy does not improve with above measures.      Seizure (HCC)- (present on admission)  Assessment & Plan  No prior history of seizures  This is likely alcohol withdrawal seizures  As needed lorazepam    Alcohol dependence with withdrawal (HCC)- (present on admission)  Assessment & Plan  Complicated with hallucinations and seizures  CIWA protocol   Monitor electrolytes and replace accordingly, particularly magnesium, potassium and phosphorus  Fall, seizure, aspiration precautions.  Thiamine folic acid and multivitamin.  Counseling was mentally clear.     Acute cystitis without hematuria- (present on admission)  Assessment & Plan  Started on ceftriaxone in the emergency room.  Continue for now follow on cultures and sensitivities    Thrombocytopenia (HCC)- (present on admission)  Assessment & Plan  Likely secondary to alcoholic liver disease   No evidence of gross bleeding at this point, continue to monitor clinically and follow-up with daily labs.     Hypokalemia- (present on admission)  Assessment & Plan  Replacing, checking magnesium    Continue to monitor replace as needed     VTE prophylaxis: SCDs/TEDs

## 2021-12-08 NOTE — PROGRESS NOTES
"1920 Report given to Rafael LUA.    1930 Patient hallucinating asking staff to hang up clothes pointing at bed sheets and also to remove the \"hamburger\" from her bed. Patient however able to answer orientation questions correctly A&Ox4. Patient agitated and attempting to get out of bed and unsteady on feet being a high fall risk. Dr. Dennis notified orders received for bilateral soft wrist restraints that are now applied. CIWA score >25.  "

## 2021-12-08 NOTE — CARE PLAN
The patient is Watcher - Medium risk of patient condition declining or worsening    Shift Goals  Clinical Goals: CIWA     Progress made toward(s) clinical / shift goals:  CIWA protocol in place, medicating per MAR. Fall precautions and seizure precautions in place.       Problem: Pain - Standard  Goal: Alleviation of pain or a reduction in pain to the patient’s comfort goal  Outcome: Progressing     Problem: Optimal Care for Alcohol Withdrawal  Goal: Optimal Care for the alcohol withdrawal patient  Outcome: Progressing     Problem: Seizure Precautions  Goal: Implementation of seizure precautions  Outcome: Progressing     Problem: Fall Risk  Goal: Patient will remain free from falls  Outcome: Progressing       Patient is not progressing towards the following goals:

## 2021-12-08 NOTE — ASSESSMENT & PLAN NOTE
Multifactorial, toxic metabolic likely secondary to alcohol withdrawal, delirium and alcohol withdrawal seizures  CIWA protocol, rehydration, monitor and replace electrolytes  Consider further diagnostic testing /imaging if encephalopathy does not improve with above measures.

## 2021-12-08 NOTE — FLOWSHEET NOTE
12/07/21 1615   Vital Signs   Pulse 96   Respiration (!) 22   Pulse Oximetry 100 %   $ Pulse Oximetry (Spot Check) Yes   CO2 Monitoring   ETCO2 (mmHg) 27   $ ETCO2 Monitoring Yes   Chest Exam   Work Of Breathing / Effort Within Normal Limits   Oxygen   O2 (LPM) 4   O2 Delivery Device Nasal Cannula

## 2021-12-08 NOTE — PROGRESS NOTES
"Pulmonary Progress Note    Date of admission  12/7/2021    Chief Complaint  31 y.o. female admitted 12/7/2021 with ETOH withdrawal    Hospital Course  Per H&P from 12/7/21 from Dr Dennis: \"31 y.o. female with a past medical history of alcohol dependence who presented 12/7/2021 with generalized weakness and palpitations.  Patient reports that she is a heavy drinker but stopped drinking alcohol 2 days ago.  She reports that she has been having poor appetite and difficulty to sleep.  She also reports having intermittent episodes of palpitations without chest pain or shortness of breath.  In the emergency room she experienced a seizure.  She was also found to have jaw dislocation that was reduced by the emergency physician.  Patient also has been experiencing visual hallucinations\"    Interval Problem Update  Reviewed last 24 hour events:  Patient with ETOH withdrawal and still with hallucinations controlled with precedex and librium. No longer on CIWA protocol    Review of Systems  Review of Systems   Unable to perform ROS: Mental acuity        Vital Signs for last 24 hours   Temp:  [35.8 °C (96.4 °F)-36.6 °C (97.8 °F)] 36.6 °C (97.8 °F)  Pulse:  [44-77] 65  Resp:  [12-35] 16  BP: ()/(66-92) 114/71  SpO2:  [91 %-99 %] 91 %    Hemodynamic parameters for last 24 hours       Respiratory Information for the last 24 hours       Physical Exam   Physical Exam  Vitals and nursing note reviewed.   Constitutional:       General: She is not in acute distress.     Appearance: She is not toxic-appearing.      Comments: Very thin lady almost caquectic   HENT:      Head: Normocephalic and atraumatic.      Nose: Nose normal.      Mouth/Throat:      Mouth: Mucous membranes are moist.   Eyes:      Extraocular Movements: Extraocular movements intact.   Cardiovascular:      Rate and Rhythm: Normal rate and regular rhythm.      Pulses: Normal pulses.      Heart sounds: Normal heart sounds. No murmur heard.  No gallop.    Pulmonary: "      Effort: Pulmonary effort is normal. No respiratory distress.      Breath sounds: Normal breath sounds. No stridor. No wheezing or rales.   Abdominal:      General: Bowel sounds are normal.   Musculoskeletal:         General: No swelling. Normal range of motion.      Cervical back: Normal range of motion. No rigidity.      Right lower leg: No edema.      Left lower leg: No edema.   Skin:     General: Skin is warm.      Capillary Refill: Capillary refill takes less than 2 seconds.      Findings: No rash.   Neurological:      General: No focal deficit present.      Mental Status: She is alert and oriented to person, place, and time.   Psychiatric:         Mood and Affect: Mood normal.         Medications  Current Facility-Administered Medications   Medication Dose Route Frequency Provider Last Rate Last Admin   • chlordiazePOXIDE (LIBRIUM) capsule 25 mg  25 mg Oral BID Kermit Choi M.D.   25 mg at 12/09/21 1251   • enoxaparin (LOVENOX) inj 40 mg  40 mg Subcutaneous DAILY Kermit Choi M.D.       • dexmedetomidine (Precedex) 400 mcg in  mL infusion  0.1-1.5 mcg/kg/hr Intravenous Continuous Mk Miller D.O. 11.9 mL/hr at 12/09/21 0637 1 mcg/kg/hr at 12/09/21 0637   • thiamine (Vitamin B-1) tablet 300 mg  300 mg Oral TID Kermit Choi M.D.        And   • multivitamin (THERAGRAN) tablet 1 Tablet  1 Tablet Oral DAILY Kermit Choi M.D.        And   • folic acid (FOLVITE) tablet 1 mg  1 mg Oral DAILY Kermit Choi M.D.       • MD Alert...ICU Electrolyte Replacement per Pharmacy   Other PHARMACY TO DOSE Kermit Choi M.D.       • acetaminophen (Tylenol) tablet 650 mg  650 mg Oral Q6HRS PRN Jcaque Dennis M.D.       • senna-docusate (PERICOLACE or SENOKOT S) 8.6-50 MG per tablet 2 Tablet  2 Tablet Oral BID Jacque Dennis M.D.        And   • polyethylene glycol/lytes (MIRALAX) PACKET 1 Packet  1 Packet Oral QDAY PRN Jacque Dennis M.D.        And   • magnesium hydroxide  (MILK OF MAGNESIA) suspension 30 mL  30 mL Oral QDAY PRN Asem DAVE Dennis M.D.        And   • bisacodyl (DULCOLAX) suppository 10 mg  10 mg Rectal QDAY PRN Asem DAVE Dennis M.D.       • 0.9 % NaCl with KCl 20 mEq infusion   Intravenous Continuous Asem DAVE Dennis M.D. 83 mL/hr at 12/08/21 2300 New Bag at 12/08/21 2300   • Pharmacy Consult Request ...Pain Management Review 1 Each  1 Each Other PHARMACY TO DOSE Asem DAVE Dennis M.D.       • oxyCODONE immediate-release (ROXICODONE) tablet 2.5 mg  2.5 mg Oral Q3HRS PRN Asem DAVE Dennis M.D.        Or   • oxyCODONE immediate-release (ROXICODONE) tablet 5 mg  5 mg Oral Q3HRS PRN Asem DAVE Dennis M.D.        Or   • HYDROmorphone (Dilaudid) injection 0.25 mg  0.25 mg Intravenous Q3HRS PRN Asem DAVE Dennis M.D.       • ondansetron (ZOFRAN) syringe/vial injection 4 mg  4 mg Intravenous Q4HRS PRN Asem DAVE Dennis M.D.       • ondansetron (ZOFRAN ODT) dispertab 4 mg  4 mg Oral Q4HRS PRN Asem DAVE Dennis M.D.       • promethazine (PHENERGAN) tablet 12.5-25 mg  12.5-25 mg Oral Q4HRS PRN Asem DAVE Dennis M.D.       • promethazine (PHENERGAN) suppository 12.5-25 mg  12.5-25 mg Rectal Q4HRS PRN Asem DAVE Dennis M.D.       • prochlorperazine (COMPAZINE) injection 5-10 mg  5-10 mg Intravenous Q4HRS PRN Asem DAVE Dennis M.D.           Fluids    Intake/Output Summary (Last 24 hours) at 12/9/2021 1525  Last data filed at 12/9/2021 1500  Gross per 24 hour   Intake 2050.55 ml   Output 1550 ml   Net 500.55 ml       Laboratory          Recent Labs     12/07/21  1200 12/08/21  0840 12/09/21  0335   SODIUM 135 144 138   POTASSIUM 3.5* 3.3* 4.0   CHLORIDE 92* 111 107   CO2 21 21 19*   BUN 7* 2* 2*   CREATININE 0.45* 0.25* 0.25*   MAGNESIUM 1.5 1.7 1.7   PHOSPHORUS  --  2.2* 2.2*   CALCIUM 9.5 8.0* 8.1*     Recent Labs     12/07/21  1200 12/08/21  0840 12/09/21  0335   ALTSGPT 66* 45  --    ASTSGOT 180* 106*  --    ALKPHOSPHAT 174* 117*  --    TBILIRUBIN 2.6* 1.4  --    LIPASE 49  --   --     GLUCOSE 70 118* 98     Recent Labs     12/07/21  1200 12/08/21  0840 12/08/21  1048   WBC 4.0*  --  4.7*   NEUTSPOLYS 56.90  --  58.00   LYMPHOCYTES 24.70  --  26.00   MONOCYTES 18.00*  --  14.30*   EOSINOPHILS 0.20  --  1.30   BASOPHILS 0.20  --  0.20   ASTSGOT 180* 106*  --    ALTSGPT 66* 45  --    ALKPHOSPHAT 174* 117*  --    TBILIRUBIN 2.6* 1.4  --      Recent Labs     12/07/21  1200 12/08/21  1048   RBC 3.96* 3.26*   HEMOGLOBIN 14.4 11.8*   HEMATOCRIT 40.9 35.1*   PLATELETCT 43* 41*       Imaging  X-Ray:  I have personally reviewed the images and compared with prior images.    Assessment/Plan  * Acute encephalopathy- (present on admission)  Assessment & Plan  Improving, Most likely related to ETOH withdrawal  Renal panel daily  If not improving will consider repeat CT head    Acute cystitis without hematuria- (present on admission)  Assessment & Plan  Patient with UA concerning for UTI, her mental acuity does not let us to identify what symptoms she was having.  We will continue treatment with ceftriaxone to complete 3 days    Thrombocytopenia (HCC)- (present on admission)  Assessment & Plan  Unclear if due to chronic ETOH use but we will repeat the lab  Hold anticoagulation prophylaxis for now  No signs of spontaneous bleeding.    Alcohol dependence with withdrawal (HCC)- (present on admission)  Assessment & Plan  Patient presented with symptoms compatible with ETOH withdrawal, initially placed on CIWA protocol and later changed to precedex with target RASS 0, librium and intermitent use of ativan  She has been presenting visual hallucinations but overall calm  No major issues but she does need re-orientation every so often  Will increase the dose of thiamine to higher doses   Continue with folic acid and MV  Clear liquid diet  Replace electrolytes as needed  Consider psych consultation once she is more stable       VTE:  paitent with low platelets, will hold for now and repeat lab  Ulcer: Not  Indicated  Lines: None    I have performed a physical exam and reviewed and updated ROS and Plan today (12/9/2021). In review of yesterday's note (12/8/2021), there are no changes except as documented above.     Discussed patient condition and risk of morbidity and/or mortality with RN and RT  The patient remains critically ill.  Critical care time = 50 minutes in directly providing and coordinating critical care and extensive data review.  No time overlap and excludes procedures.

## 2021-12-08 NOTE — ASSESSMENT & PLAN NOTE
Likely secondary to alcoholic liver disease   No evidence of gross bleeding at this point, continue to monitor clinically and follow-up with daily labs.

## 2021-12-08 NOTE — ED NOTES
While under sedation patient was hallucinating and telling the RN's in the room  That we need to behave and making many references to us being her son,  She told us she was at a party and became agitated and hands twitchy.  Patient slowly became more aware of her surroundings and was able to tell us where she was and the procedure we did.

## 2021-12-09 PROBLEM — R74.01 TRANSAMINITIS: Status: ACTIVE | Noted: 2021-12-09

## 2021-12-09 LAB
ANION GAP SERPL CALC-SCNC: 12 MMOL/L (ref 7–16)
BUN SERPL-MCNC: 2 MG/DL (ref 8–22)
CA-I SERPL-SCNC: 1.13 MMOL/L (ref 1.1–1.3)
CALCIUM SERPL-MCNC: 8.1 MG/DL (ref 8.4–10.2)
CHLORIDE SERPL-SCNC: 107 MMOL/L (ref 96–112)
CO2 SERPL-SCNC: 19 MMOL/L (ref 20–33)
CREAT SERPL-MCNC: 0.25 MG/DL (ref 0.5–1.4)
GLUCOSE SERPL-MCNC: 98 MG/DL (ref 65–99)
MAGNESIUM SERPL-MCNC: 1.7 MG/DL (ref 1.5–2.5)
PHOSPHATE SERPL-MCNC: 2.2 MG/DL (ref 2.5–4.5)
POTASSIUM SERPL-SCNC: 4 MMOL/L (ref 3.6–5.5)
SODIUM SERPL-SCNC: 138 MMOL/L (ref 135–145)

## 2021-12-09 PROCEDURE — 700101 HCHG RX REV CODE 250: Performed by: INTERNAL MEDICINE

## 2021-12-09 PROCEDURE — 82330 ASSAY OF CALCIUM: CPT

## 2021-12-09 PROCEDURE — 700105 HCHG RX REV CODE 258: Performed by: HOSPITALIST

## 2021-12-09 PROCEDURE — 700111 HCHG RX REV CODE 636 W/ 250 OVERRIDE (IP): Performed by: INTERNAL MEDICINE

## 2021-12-09 PROCEDURE — 700102 HCHG RX REV CODE 250 W/ 637 OVERRIDE(OP): Performed by: HOSPITALIST

## 2021-12-09 PROCEDURE — 99291 CRITICAL CARE FIRST HOUR: CPT | Performed by: INTERNAL MEDICINE

## 2021-12-09 PROCEDURE — 83735 ASSAY OF MAGNESIUM: CPT

## 2021-12-09 PROCEDURE — 700105 HCHG RX REV CODE 258: Performed by: INTERNAL MEDICINE

## 2021-12-09 PROCEDURE — 770006 HCHG ROOM/CARE - MED/SURG/GYN SEMI*

## 2021-12-09 PROCEDURE — 80048 BASIC METABOLIC PNL TOTAL CA: CPT

## 2021-12-09 PROCEDURE — A9270 NON-COVERED ITEM OR SERVICE: HCPCS | Performed by: HOSPITALIST

## 2021-12-09 PROCEDURE — 84100 ASSAY OF PHOSPHORUS: CPT

## 2021-12-09 PROCEDURE — 700111 HCHG RX REV CODE 636 W/ 250 OVERRIDE (IP): Performed by: HOSPITALIST

## 2021-12-09 PROCEDURE — A9270 NON-COVERED ITEM OR SERVICE: HCPCS | Performed by: INTERNAL MEDICINE

## 2021-12-09 PROCEDURE — 700102 HCHG RX REV CODE 250 W/ 637 OVERRIDE(OP): Performed by: INTERNAL MEDICINE

## 2021-12-09 RX ORDER — CHLORDIAZEPOXIDE HYDROCHLORIDE 25 MG/1
25 CAPSULE, GELATIN COATED ORAL 2 TIMES DAILY
Status: DISCONTINUED | OUTPATIENT
Start: 2021-12-09 | End: 2021-12-10 | Stop reason: HOSPADM

## 2021-12-09 RX ORDER — CHLORDIAZEPOXIDE HYDROCHLORIDE 25 MG/1
25 CAPSULE, GELATIN COATED ORAL 2 TIMES DAILY
Status: DISCONTINUED | OUTPATIENT
Start: 2021-12-09 | End: 2021-12-09

## 2021-12-09 RX ORDER — MAGNESIUM SULFATE HEPTAHYDRATE 40 MG/ML
2 INJECTION, SOLUTION INTRAVENOUS ONCE
Status: COMPLETED | OUTPATIENT
Start: 2021-12-09 | End: 2021-12-09

## 2021-12-09 RX ORDER — LORAZEPAM 2 MG/ML
2 INJECTION INTRAMUSCULAR ONCE
Status: COMPLETED | OUTPATIENT
Start: 2021-12-09 | End: 2021-12-09

## 2021-12-09 RX ADMIN — ENOXAPARIN SODIUM 40 MG: 40 INJECTION SUBCUTANEOUS at 17:57

## 2021-12-09 RX ADMIN — CHLORDIAZEPOXIDE HYDROCHLORIDE 25 MG: 25 CAPSULE ORAL at 12:51

## 2021-12-09 RX ADMIN — OXYCODONE 5 MG: 5 TABLET ORAL at 23:43

## 2021-12-09 RX ADMIN — ACETAMINOPHEN 650 MG: 325 TABLET, FILM COATED ORAL at 20:43

## 2021-12-09 RX ADMIN — THIAMINE HCL TAB 100 MG 300 MG: 100 TAB at 17:57

## 2021-12-09 RX ADMIN — DEXMEDETOMIDINE 1 MCG/KG/HR: 200 INJECTION, SOLUTION INTRAVENOUS at 02:21

## 2021-12-09 RX ADMIN — LORAZEPAM 2 MG: 2 INJECTION INTRAMUSCULAR; INTRAVENOUS at 04:30

## 2021-12-09 RX ADMIN — MAGNESIUM SULFATE HEPTAHYDRATE 2 G: 40 INJECTION, SOLUTION INTRAVENOUS at 07:30

## 2021-12-09 RX ADMIN — CEFTRIAXONE SODIUM 1 G: 1 INJECTION, POWDER, FOR SOLUTION INTRAMUSCULAR; INTRAVENOUS at 06:27

## 2021-12-09 RX ADMIN — SODIUM PHOSPHATE, MONOBASIC, MONOHYDRATE 15 MMOL: 276; 142 INJECTION, SOLUTION INTRAVENOUS at 08:36

## 2021-12-09 RX ADMIN — CHLORDIAZEPOXIDE HYDROCHLORIDE 25 MG: 25 CAPSULE ORAL at 17:57

## 2021-12-09 RX ADMIN — THIAMINE HCL TAB 100 MG 300 MG: 100 TAB at 20:43

## 2021-12-09 ASSESSMENT — LIFESTYLE VARIABLES
ANXIETY: NO ANXIETY (AT EASE)
ORIENTATION AND CLOUDING OF SENSORIUM: DISORIENTED FOR PLACE AND / OR PERSON
TOTAL SCORE: 4
PAROXYSMAL SWEATS: NO SWEAT VISIBLE
TOTAL SCORE: 4
ANXIETY: MILDLY ANXIOUS
VISUAL DISTURBANCES: NOT PRESENT
TOTAL SCORE: 4
TOTAL SCORE: 4
ORIENTATION AND CLOUDING OF SENSORIUM: CANNOT DO SERIAL ADDITIONS OR IS UNCERTAIN ABOUT DATE
HEADACHE, FULLNESS IN HEAD: MILD
TOTAL SCORE: 19
AGITATION: NORMAL ACTIVITY
ORIENTATION AND CLOUDING OF SENSORIUM: CANNOT DO SERIAL ADDITIONS OR IS UNCERTAIN ABOUT DATE
NAUSEA AND VOMITING: NO NAUSEA AND NO VOMITING
VISUAL DISTURBANCES: NOT PRESENT
CONSUMPTION TOTAL: POSITIVE
TREMOR: NO TREMOR
AGITATION: NORMAL ACTIVITY
ORIENTATION AND CLOUDING OF SENSORIUM: DISORIENTED FOR PLACE AND / OR PERSON
NAUSEA AND VOMITING: NO NAUSEA AND NO VOMITING
ANXIETY: MILDLY ANXIOUS
AUDITORY DISTURBANCES: NOT PRESENT
TREMOR: NO TREMOR
ORIENTATION AND CLOUDING OF SENSORIUM: ORIENTED AND CAN DO SERIAL ADDITIONS
VISUAL DISTURBANCES: NOT PRESENT
PAROXYSMAL SWEATS: NO SWEAT VISIBLE
PAROXYSMAL SWEATS: NO SWEAT VISIBLE
HEADACHE, FULLNESS IN HEAD: NOT PRESENT
ORIENTATION AND CLOUDING OF SENSORIUM: DISORIENTED FOR PLACE AND / OR PERSON
AUDITORY DISTURBANCES: NOT PRESENT
TOTAL SCORE: 4
HEADACHE, FULLNESS IN HEAD: NOT PRESENT
NAUSEA AND VOMITING: NO NAUSEA AND NO VOMITING
AUDITORY DISTURBANCES: NOT PRESENT
AGITATION: NORMAL ACTIVITY
TREMOR: TREMOR NOT VISIBLE BUT CAN BE FELT, FINGERTIP TO FINGERTIP
ANXIETY: *
ANXIETY: NO ANXIETY (AT EASE)
HAVE YOU EVER FELT YOU SHOULD CUT DOWN ON YOUR DRINKING: YES
HEADACHE, FULLNESS IN HEAD: NOT PRESENT
ON A TYPICAL DAY WHEN YOU DRINK ALCOHOL HOW MANY DRINKS DO YOU HAVE: 2
VISUAL DISTURBANCES: NOT PRESENT
VISUAL DISTURBANCES: NOT PRESENT
NAUSEA AND VOMITING: NO NAUSEA AND NO VOMITING
TREMOR: TREMOR NOT VISIBLE BUT CAN BE FELT, FINGERTIP TO FINGERTIP
HEADACHE, FULLNESS IN HEAD: NOT PRESENT
VISUAL DISTURBANCES: NOT PRESENT
AUDITORY DISTURBANCES: VERY MILD HARSHNESS OR ABILITY TO FRIGHTEN
AUDITORY DISTURBANCES: NOT PRESENT
NAUSEA AND VOMITING: NO NAUSEA AND NO VOMITING
PAROXYSMAL SWEATS: NO SWEAT VISIBLE
HEADACHE, FULLNESS IN HEAD: NOT PRESENT
TOTAL SCORE: 3
VISUAL DISTURBANCES: MODERATE SENSITIVITY
ORIENTATION AND CLOUDING OF SENSORIUM: ORIENTED AND CAN DO SERIAL ADDITIONS
AUDITORY DISTURBANCES: NOT PRESENT
AGITATION: NORMAL ACTIVITY
PAROXYSMAL SWEATS: NO SWEAT VISIBLE
AUDITORY DISTURBANCES: NOT PRESENT
VISUAL DISTURBANCES: NOT PRESENT
TOTAL SCORE: 4
AUDITORY DISTURBANCES: NOT PRESENT
HEADACHE, FULLNESS IN HEAD: NOT PRESENT
TREMOR: TREMOR NOT VISIBLE BUT CAN BE FELT, FINGERTIP TO FINGERTIP
HEADACHE, FULLNESS IN HEAD: NOT PRESENT
ORIENTATION AND CLOUDING OF SENSORIUM: CANNOT DO SERIAL ADDITIONS OR IS UNCERTAIN ABOUT DATE
ANXIETY: NO ANXIETY (AT EASE)
AVERAGE NUMBER OF DAYS PER WEEK YOU HAVE A DRINK CONTAINING ALCOHOL: 14
TREMOR: NO TREMOR
NAUSEA AND VOMITING: NO NAUSEA AND NO VOMITING
EVER FELT BAD OR GUILTY ABOUT YOUR DRINKING: YES
NAUSEA AND VOMITING: NO NAUSEA AND NO VOMITING
TOTAL SCORE: 2
TOTAL SCORE: 1
HOW MANY TIMES IN THE PAST YEAR HAVE YOU HAD 5 OR MORE DRINKS IN A DAY: 0
AGITATION: NORMAL ACTIVITY
PAROXYSMAL SWEATS: NO SWEAT VISIBLE
EVER HAD A DRINK FIRST THING IN THE MORNING TO STEADY YOUR NERVES TO GET RID OF A HANGOVER: YES
HAVE PEOPLE ANNOYED YOU BY CRITICIZING YOUR DRINKING: YES
TREMOR: TREMOR NOT VISIBLE BUT CAN BE FELT, FINGERTIP TO FINGERTIP
PAROXYSMAL SWEATS: NO SWEAT VISIBLE
TOTAL SCORE: 3
AGITATION: NORMAL ACTIVITY
AGITATION: *
PAROXYSMAL SWEATS: NO SWEAT VISIBLE
TREMOR: TREMOR NOT VISIBLE BUT CAN BE FELT, FINGERTIP TO FINGERTIP
NAUSEA AND VOMITING: NO NAUSEA AND NO VOMITING
ANXIETY: MILDLY ANXIOUS
AGITATION: NORMAL ACTIVITY
ALCOHOL_USE: YES
ANXIETY: MILDLY ANXIOUS

## 2021-12-09 ASSESSMENT — PAIN DESCRIPTION - PAIN TYPE
TYPE: ACUTE PAIN

## 2021-12-09 ASSESSMENT — PATIENT HEALTH QUESTIONNAIRE - PHQ9
SUM OF ALL RESPONSES TO PHQ9 QUESTIONS 1 AND 2: 0
1. LITTLE INTEREST OR PLEASURE IN DOING THINGS: NOT AT ALL
2. FEELING DOWN, DEPRESSED, IRRITABLE, OR HOPELESS: NOT AT ALL

## 2021-12-09 ASSESSMENT — FIBROSIS 4 INDEX: FIB4 SCORE: 11.56

## 2021-12-09 NOTE — ASSESSMENT & PLAN NOTE
Patient with UA concerning for UTI, her mental acuity does not let us to identify what symptoms she was having.  Completed treatment with ceftriaxone 3 days

## 2021-12-09 NOTE — CARE PLAN
Problem: Nutritional:  Goal: Achieve adequate nutritional intake  Description: Initiate diet and advance beyond clears.Patient will consume >50% of meals  Outcome: Not Met     See RD note from 12/8.

## 2021-12-09 NOTE — CARE PLAN
The patient is Watcher - Medium risk of patient condition declining or worsening    Shift Goals  Clinical Goals: Safety, manage ETOH withdrawal symptoms  Patient Goals: MARIA  Family Goals: No family present    Progress made toward(s) clinical / shift goals:    Problem: Optimal Care for Alcohol Withdrawal  Goal: Optimal Care for the alcohol withdrawal patient  Outcome: Progressing  Note: Safety measures in place, precedex being titrated to a goal RASS of 0.      Problem: Safety - Medical Restraint  Goal: Remains free of injury from restraints (Restraint for Interference with Medical Device)  Outcome: Progressing       Patient is not progressing towards the following goals:      Problem: Knowledge Deficit - Standard  Goal: Patient and family/care givers will demonstrate understanding of plan of care, disease process/condition, diagnostic tests and medications  Outcome: Not Progressing  Note: Pt with intermittent confusion, disoriented to situation and place. POC discussed however pt unable to understand and states she is being held captive.

## 2021-12-09 NOTE — PROGRESS NOTES
"Pulmonary Progress Note    Date of admission  12/7/2021    Chief Complaint  31 y.o. female admitted 12/7/2021 with ETOH withdrawal    Hospital Course  Per H&P from 12/7/21 from Dr Dennis: \"31 y.o. female with a past medical history of alcohol dependence who presented 12/7/2021 with generalized weakness and palpitations.  Patient reports that she is a heavy drinker but stopped drinking alcohol 2 days ago.  She reports that she has been having poor appetite and difficulty to sleep.  She also reports having intermittent episodes of palpitations without chest pain or shortness of breath.  In the emergency room she experienced a seizure.  She was also found to have jaw dislocation that was reduced by the emergency physician.  Patient also has been experiencing visual hallucinations\"    Interval Problem Update  Reviewed last 24 hour events:  More awake today and more interactive with staff although still with some symptoms and unclear hallucinations  Due to mental status she was not eating much but starting to eat more.  Overnight she was a bit hard to orient but doing well this am.      Review of Systems  Review of Systems   Unable to perform ROS: Mental acuity        Vital Signs for last 24 hours   Temp:  [35.8 °C (96.4 °F)-36.6 °C (97.8 °F)] 36.6 °C (97.8 °F)  Pulse:  [44-77] 65  Resp:  [12-35] 16  BP: ()/(66-92) 114/71  SpO2:  [91 %-99 %] 91 %    Hemodynamic parameters for last 24 hours       Respiratory Information for the last 24 hours       Physical Exam   Physical Exam  Vitals and nursing note reviewed.   Constitutional:       General: She is not in acute distress.     Appearance: She is not toxic-appearing.      Comments: Very thin lady almost caquectic   HENT:      Head: Normocephalic and atraumatic.      Nose: Nose normal.      Mouth/Throat:      Mouth: Mucous membranes are moist.   Eyes:      Extraocular Movements: Extraocular movements intact.   Cardiovascular:      Rate and Rhythm: Normal rate and " regular rhythm.      Pulses: Normal pulses.      Heart sounds: Normal heart sounds. No murmur heard.  No gallop.    Pulmonary:      Effort: Pulmonary effort is normal. No respiratory distress.      Breath sounds: Normal breath sounds. No stridor. No wheezing or rales.   Abdominal:      General: Bowel sounds are normal.   Musculoskeletal:         General: No swelling. Normal range of motion.      Cervical back: Normal range of motion. No rigidity.      Right lower leg: No edema.      Left lower leg: No edema.   Skin:     General: Skin is warm.      Capillary Refill: Capillary refill takes less than 2 seconds.      Findings: No rash.   Neurological:      General: No focal deficit present.      Mental Status: She is alert.   Psychiatric:         Mood and Affect: Mood normal.         Medications  Current Facility-Administered Medications   Medication Dose Route Frequency Provider Last Rate Last Admin   • chlordiazePOXIDE (LIBRIUM) capsule 25 mg  25 mg Oral BID Kermit Choi M.D.   25 mg at 12/09/21 1251   • enoxaparin (LOVENOX) inj 40 mg  40 mg Subcutaneous DAILY Kermit Choi M.D.       • dexmedetomidine (Precedex) 400 mcg in  mL infusion  0.1-1.5 mcg/kg/hr Intravenous Continuous Mk Miller D.O. 11.9 mL/hr at 12/09/21 0637 1 mcg/kg/hr at 12/09/21 0637   • thiamine (Vitamin B-1) tablet 300 mg  300 mg Oral TID Kermit Choi M.D.        And   • multivitamin (THERAGRAN) tablet 1 Tablet  1 Tablet Oral DAILY Kermit Choi M.D.        And   • folic acid (FOLVITE) tablet 1 mg  1 mg Oral DAILY Kermit Choi M.D.       • MD Alert...ICU Electrolyte Replacement per Pharmacy   Other PHARMACY TO DOSE Kermit Choi M.D.       • acetaminophen (Tylenol) tablet 650 mg  650 mg Oral Q6HRS PRN Jacque Dennis M.D.       • senna-docusate (PERICOLACE or SENOKOT S) 8.6-50 MG per tablet 2 Tablet  2 Tablet Oral BID Jacque Dennis M.D.        And   • polyethylene glycol/lytes (MIRALAX) PACKET 1  Packet  1 Packet Oral QDAY PRN Asem DAVE Dennis M.D.        And   • magnesium hydroxide (MILK OF MAGNESIA) suspension 30 mL  30 mL Oral QDAY PRN Asem DAVE Dennis M.D.        And   • bisacodyl (DULCOLAX) suppository 10 mg  10 mg Rectal QDAY PRN Asem DAVE Dennis M.D.       • 0.9 % NaCl with KCl 20 mEq infusion   Intravenous Continuous Asem DAVE Dennis M.D. 83 mL/hr at 12/08/21 2300 New Bag at 12/08/21 2300   • Pharmacy Consult Request ...Pain Management Review 1 Each  1 Each Other PHARMACY TO DOSE Asem DAVE Dennis M.D.       • oxyCODONE immediate-release (ROXICODONE) tablet 2.5 mg  2.5 mg Oral Q3HRS PRN Asem DAVE Dennis M.D.        Or   • oxyCODONE immediate-release (ROXICODONE) tablet 5 mg  5 mg Oral Q3HRS PRN Asem DAVE Dennis M.D.        Or   • HYDROmorphone (Dilaudid) injection 0.25 mg  0.25 mg Intravenous Q3HRS PRN Asem DAVE Dennis M.D.       • ondansetron (ZOFRAN) syringe/vial injection 4 mg  4 mg Intravenous Q4HRS PRN Asem DAVE Dennis M.D.       • ondansetron (ZOFRAN ODT) dispertab 4 mg  4 mg Oral Q4HRS PRN Asem DAVE Dennis M.D.       • promethazine (PHENERGAN) tablet 12.5-25 mg  12.5-25 mg Oral Q4HRS PRN Asem DAVE Dennis M.D.       • promethazine (PHENERGAN) suppository 12.5-25 mg  12.5-25 mg Rectal Q4HRS PRN Asem DAVE Dennis M.D.       • prochlorperazine (COMPAZINE) injection 5-10 mg  5-10 mg Intravenous Q4HRS PRN Asem DAVE Dennis M.D.           Fluids    Intake/Output Summary (Last 24 hours) at 12/9/2021 1536  Last data filed at 12/9/2021 1500  Gross per 24 hour   Intake 2050.55 ml   Output 1550 ml   Net 500.55 ml       Laboratory          Recent Labs     12/07/21  1200 12/08/21  0840 12/09/21  0335   SODIUM 135 144 138   POTASSIUM 3.5* 3.3* 4.0   CHLORIDE 92* 111 107   CO2 21 21 19*   BUN 7* 2* 2*   CREATININE 0.45* 0.25* 0.25*   MAGNESIUM 1.5 1.7 1.7   PHOSPHORUS  --  2.2* 2.2*   CALCIUM 9.5 8.0* 8.1*     Recent Labs     12/07/21  1200 12/08/21  0840 12/09/21  0335   ALTSGPT 66* 45  --    ASTSGOT 180* 106*   --    ALKPHOSPHAT 174* 117*  --    TBILIRUBIN 2.6* 1.4  --    LIPASE 49  --   --    GLUCOSE 70 118* 98     Recent Labs     12/07/21  1200 12/08/21  0840 12/08/21  1048   WBC 4.0*  --  4.7*   NEUTSPOLYS 56.90  --  58.00   LYMPHOCYTES 24.70  --  26.00   MONOCYTES 18.00*  --  14.30*   EOSINOPHILS 0.20  --  1.30   BASOPHILS 0.20  --  0.20   ASTSGOT 180* 106*  --    ALTSGPT 66* 45  --    ALKPHOSPHAT 174* 117*  --    TBILIRUBIN 2.6* 1.4  --      Recent Labs     12/07/21  1200 12/08/21  1048   RBC 3.96* 3.26*   HEMOGLOBIN 14.4 11.8*   HEMATOCRIT 40.9 35.1*   PLATELETCT 43* 41*       Imaging  X-Ray:  I have personally reviewed the images and compared with prior images.    Assessment/Plan  * Alcohol dependence with withdrawal (HCC)- (present on admission)  Assessment & Plan  -- Patient presented with symptoms compatible with ETOH withdrawal, initially placed on CIWA protocol and later changed to precedex with target RASS 0, librium and intermitent use of ativan. Today will given dose of librium and try to wean down precedex if patient symptoms allows it.  -- Less visual hallucinations but overall calm  -- No major issues but she does need re-orientation every so often  -- Continue with thiamine to higher doses   -- Continue with folic acid and MV  -- Clear liquid diet, can advance diet as tolerated  -- Replace electrolytes as needed  -- Consider psych consultation once she is more stable    Acute encephalopathy- (present on admission)  Assessment & Plan  Improving, Most likely related to ETOH withdrawal  Renal panel daily  If not improving will consider repeat CT head    Acute cystitis without hematuria- (present on admission)  Assessment & Plan  Patient with UA concerning for UTI, her mental acuity does not let us to identify what symptoms she was having.  Completed treatment with ceftriaxone 3 days    Thrombocytopenia (HCC)- (present on admission)  Assessment & Plan  Suspected due to chronic ETOH use.  Will start  anticoagulation prophylaxis with lovenox  No signs of spontaneous bleeding.    Hypokalemia- (present on admission)  Assessment & Plan  Replace electrolytes as needed         VTE:  paitent with low platelets, will hold for now and repeat lab  Ulcer: Not Indicated  Lines: None    I have performed a physical exam and reviewed and updated ROS and Plan today (12/9/2021). In review of yesterday's note (12/8/2021), there are no changes except as documented above.     Discussed patient condition and risk of morbidity and/or mortality with RN and RT  The patient remains critically ill.  Critical care time = 50 minutes in directly providing and coordinating critical care and extensive data review.  No time overlap and excludes procedures.

## 2021-12-09 NOTE — ASSESSMENT & PLAN NOTE
Improving, Most likely related to ETOH withdrawal  Renal panel daily  If not improving will consider repeat CT head

## 2021-12-09 NOTE — PROGRESS NOTES
"[id number] Assumed pt care. Report received from day shift RN. Pt awake and agitated attempting to get out of bed, was able to rip out one of her IV's and actively biting at her restraints. Able to state name, and looked at her phone for the time but disoriented to place and situation. States she is being held \"hostage at the [employer] bin\" from which one enters via \"a secret passage\". Also states theres a big mean lady standing in the corner of room whom she doesn't like as pt  began to cry. Pt needs constant re-orientation. Bilateral soft wrist restraints re-applied for safety and precedex drip titrated to manage alcohol withdrawal symptoms. One time dose of ativan obtainer per doctor-orders followed appropriately.  All safety precautions in place, call light within reach, bed alarm on. Hourly rounding in place.   "

## 2021-12-09 NOTE — DISCHARGE PLANNING
Anticipated Discharge Disposition: [facility]      Action: CM RN met with Charge RN. Pt still on precedex drip at this time.     Barriers to Discharge: Medical Clearance     Plan: Case management to follow up with any discharge needs.

## 2021-12-09 NOTE — ASSESSMENT & PLAN NOTE
Suspected due to chronic ETOH use.  Will start anticoagulation prophylaxis with lovenox  No signs of spontaneous bleeding.

## 2021-12-10 VITALS
HEART RATE: 85 BPM | WEIGHT: 110.89 LBS | OXYGEN SATURATION: 100 % | SYSTOLIC BLOOD PRESSURE: 111 MMHG | HEIGHT: 67 IN | DIASTOLIC BLOOD PRESSURE: 73 MMHG | BODY MASS INDEX: 17.4 KG/M2 | RESPIRATION RATE: 18 BRPM | TEMPERATURE: 98 F

## 2021-12-10 LAB
EKG IMPRESSION: NORMAL
MAGNESIUM SERPL-MCNC: 1.5 MG/DL (ref 1.5–2.5)
PHOSPHATE SERPL-MCNC: 2.9 MG/DL (ref 2.5–4.5)
POTASSIUM SERPL-SCNC: 3.7 MMOL/L (ref 3.6–5.5)

## 2021-12-10 PROCEDURE — 700102 HCHG RX REV CODE 250 W/ 637 OVERRIDE(OP): Performed by: HOSPITALIST

## 2021-12-10 PROCEDURE — 83735 ASSAY OF MAGNESIUM: CPT

## 2021-12-10 PROCEDURE — A9270 NON-COVERED ITEM OR SERVICE: HCPCS | Performed by: HOSPITALIST

## 2021-12-10 PROCEDURE — A9270 NON-COVERED ITEM OR SERVICE: HCPCS | Performed by: INTERNAL MEDICINE

## 2021-12-10 PROCEDURE — 700102 HCHG RX REV CODE 250 W/ 637 OVERRIDE(OP): Performed by: INTERNAL MEDICINE

## 2021-12-10 PROCEDURE — 99239 HOSP IP/OBS DSCHRG MGMT >30: CPT | Performed by: HOSPITALIST

## 2021-12-10 PROCEDURE — 700111 HCHG RX REV CODE 636 W/ 250 OVERRIDE (IP): Performed by: HOSPITALIST

## 2021-12-10 PROCEDURE — 36415 COLL VENOUS BLD VENIPUNCTURE: CPT

## 2021-12-10 PROCEDURE — 84132 ASSAY OF SERUM POTASSIUM: CPT

## 2021-12-10 PROCEDURE — 84100 ASSAY OF PHOSPHORUS: CPT

## 2021-12-10 RX ORDER — LANOLIN ALCOHOL/MO/W.PET/CERES
CREAM (GRAM) TOPICAL
Qty: 46 TABLET | Refills: 0 | Status: SHIPPED | OUTPATIENT
Start: 2021-12-10 | End: 2022-01-09

## 2021-12-10 RX ORDER — FOLIC ACID 1 MG/1
1 TABLET ORAL DAILY
Qty: 30 TABLET | Refills: 0 | Status: ON HOLD
Start: 2021-12-11 | End: 2022-10-15

## 2021-12-10 RX ORDER — MAGNESIUM SULFATE HEPTAHYDRATE 40 MG/ML
4 INJECTION, SOLUTION INTRAVENOUS ONCE
Status: COMPLETED | OUTPATIENT
Start: 2021-12-10 | End: 2021-12-10

## 2021-12-10 RX ADMIN — CHLORDIAZEPOXIDE HYDROCHLORIDE 25 MG: 25 CAPSULE ORAL at 05:27

## 2021-12-10 RX ADMIN — OXYCODONE 5 MG: 5 TABLET ORAL at 08:15

## 2021-12-10 RX ADMIN — MAGNESIUM SULFATE IN WATER 4 G: 40 INJECTION, SOLUTION INTRAVENOUS at 10:32

## 2021-12-10 RX ADMIN — FOLIC ACID 1 MG: 1 TABLET ORAL at 05:27

## 2021-12-10 RX ADMIN — THIAMINE HCL TAB 100 MG 300 MG: 100 TAB at 14:26

## 2021-12-10 RX ADMIN — THIAMINE HCL TAB 100 MG 300 MG: 100 TAB at 08:07

## 2021-12-10 RX ADMIN — THERA TABS 1 TABLET: TAB at 05:27

## 2021-12-10 ASSESSMENT — LIFESTYLE VARIABLES
AGITATION: NORMAL ACTIVITY
VISUAL DISTURBANCES: NOT PRESENT
HEADACHE, FULLNESS IN HEAD: NOT PRESENT
AUDITORY DISTURBANCES: NOT PRESENT
NAUSEA AND VOMITING: NO NAUSEA AND NO VOMITING
TOTAL SCORE: 1
HEADACHE, FULLNESS IN HEAD: NOT PRESENT
AUDITORY DISTURBANCES: NOT PRESENT
ANXIETY: MILDLY ANXIOUS
PAROXYSMAL SWEATS: NO SWEAT VISIBLE
TOTAL SCORE: 1
ORIENTATION AND CLOUDING OF SENSORIUM: ORIENTED AND CAN DO SERIAL ADDITIONS
AGITATION: NORMAL ACTIVITY
HEADACHE, FULLNESS IN HEAD: NOT PRESENT
AGITATION: NORMAL ACTIVITY
ORIENTATION AND CLOUDING OF SENSORIUM: ORIENTED AND CAN DO SERIAL ADDITIONS
HEADACHE, FULLNESS IN HEAD: NOT PRESENT
PAROXYSMAL SWEATS: NO SWEAT VISIBLE
ORIENTATION AND CLOUDING OF SENSORIUM: ORIENTED AND CAN DO SERIAL ADDITIONS
TREMOR: TREMOR NOT VISIBLE BUT CAN BE FELT, FINGERTIP TO FINGERTIP
TREMOR: NO TREMOR
PAROXYSMAL SWEATS: NO SWEAT VISIBLE
ANXIETY: NO ANXIETY (AT EASE)
AUDITORY DISTURBANCES: NOT PRESENT
PAROXYSMAL SWEATS: NO SWEAT VISIBLE
TREMOR: NO TREMOR
NAUSEA AND VOMITING: NO NAUSEA AND NO VOMITING
VISUAL DISTURBANCES: NOT PRESENT
ORIENTATION AND CLOUDING OF SENSORIUM: ORIENTED AND CAN DO SERIAL ADDITIONS
AGITATION: NORMAL ACTIVITY
ANXIETY: MILDLY ANXIOUS
ANXIETY: MILDLY ANXIOUS
VISUAL DISTURBANCES: NOT PRESENT
TOTAL SCORE: 2
TREMOR: TREMOR NOT VISIBLE BUT CAN BE FELT, FINGERTIP TO FINGERTIP
NAUSEA AND VOMITING: NO NAUSEA AND NO VOMITING
TOTAL SCORE: 1
VISUAL DISTURBANCES: NOT PRESENT
AUDITORY DISTURBANCES: NOT PRESENT
NAUSEA AND VOMITING: NO NAUSEA AND NO VOMITING

## 2021-12-10 ASSESSMENT — PAIN DESCRIPTION - PAIN TYPE
TYPE: ACUTE PAIN
TYPE: ACUTE PAIN

## 2021-12-10 NOTE — PROGRESS NOTES
Nichol has been enrolled in the [facility] remote patient monitoring proof of concept program in which biometric data will be collected concurrently during the hospital stay and upon discharge and transfer for approximately 30 days.  Consent has been obtained for this project.  The monitoring device must be REMOVED for MRIs, but may be worn during all other times of patient care.  If there are questions regarding this program please contact the [facility] [place] (RT[facility]) leadership who will be happy to answer your questions.  Information about the program has been provided to the patient and is available from the [facility] nursing coordinators.  During the initial phase of this  project, we will be determining how to best use the data to optimize each patient's care and we will NOT be monitoring the data in real-time, so there will not be notifications of abnormalities, and this will not replace or change your standard care for the patient.   A referral for this monitoring program has been placed under the supervision of Dr. Adarsh Winslow, Medical Director of the [facility] [place] to enable to collection of data in the patient's electronic medical record.   Patient is a 56y old  Male who presents with a chief complaint of SOB (09 May 2021 02:13)    pt seen in icu [  ], reg med floor [   ], bed [  ], chair at bedside [   ], a+o x3 [  ], lethargic [  ],  nad [  ]    andrea [  ], ngt [  ], peg [  ], et tube [  ], cent line [  ], picc line [  ]        Allergies    No Known Allergies        Vitals    T(F): 98.9 (05-09-21 @ 05:25), Max: 98.9 (05-09-21 @ 05:25)  HR: 153 (05-09-21 @ 06:00) (61 - 163)  BP: 127/63 (05-09-21 @ 06:00) (78/43 - 161/76)  RR: 20 (05-09-21 @ 06:00) (20 - 39)  SpO2: 94% (05-09-21 @ 06:00) (88% - 100%)  Wt(kg): --  CAPILLARY BLOOD GLUCOSE      POCT Blood Glucose.: 111 mg/dL (09 May 2021 06:11)      Labs                          7.9    10.37 )-----------( 342      ( 09 May 2021 06:20 )             25.5       05-09    139  |  99  |  12  ----------------------------<  101<H>  3.9   |  41<H>  |  0.24<L>    Ca    8.1<L>      09 May 2021 06:20  Phos  2.9     05-09  Mg     2.0     05-09    TPro  6.2  /  Alb  2.5<L>  /  TBili  0.8  /  DBili  x   /  AST  15  /  ALT  19  /  AlkPhos  80  05-09            .Sputum Sputum  04-16 @ 04:42   Moderate Enterobacter aerogenes (Carbapenem Resistant)  Normal Respiratory Monserrat present  --  Enterobacter aerogenes (Carbapenem Resistant)      .Urine Clean Catch (Midstream)  03-15 @ 00:52   No growth  --  --          Radiology Results      Meds    MEDICATIONS  (STANDING):  albumin human 25% IVPB 50 milliLiter(s) IV Intermittent every 8 hours  ascorbic acid 1000 milliGRAM(s) Oral daily  BACItracin   Ointment 1 Application(s) Topical two times a day  chlorhexidine 0.12% Liquid 15 milliLiter(s) Oral Mucosa every 12 hours  chlorhexidine 2% Cloths 1 Application(s) Topical <User Schedule>  clonazePAM  Tablet 2 milliGRAM(s) Oral every 8 hours  dexMEDEtomidine Infusion 0.2 MICROgram(s)/kG/Hr (4.2 mL/Hr) IV Continuous <Continuous>  enoxaparin Injectable 40 milliGRAM(s) SubCutaneous every 24 hours  furosemide   Injectable 40 milliGRAM(s) IV Push every 12 hours  insulin lispro (ADMELOG) corrective regimen sliding scale   SubCutaneous every 6 hours  methadone    Tablet 5 milliGRAM(s) Oral every 8 hours  midodrine 10 milliGRAM(s) Oral every 8 hours  OLANZapine 10 milliGRAM(s) Oral every 12 hours  pantoprazole  Injectable 40 milliGRAM(s) IV Push every 12 hours  polyethylene glycol 3350 17 Gram(s) Oral two times a day  predniSONE   Tablet 40 milliGRAM(s) Oral daily  tamsulosin 0.4 milliGRAM(s) Oral at bedtime  trimethoprim  160 mG/sulfamethoxazole 800 mG 1 Tablet(s) Oral <User Schedule>      MEDICATIONS  (PRN):  acetaminophen    Suspension .. 650 milliGRAM(s) Oral every 12 hours PRN Temp greater or equal to 38C (100.4F)  ALBUTerol    90 MICROgram(s) HFA Inhaler 2 Puff(s) Inhalation every 6 hours PRN Shortness of Breath and/or Wheezing  artificial  tears Solution 1 Drop(s) Both EYES every 4 hours PRN Dry Eyes  LORazepam   Injectable 2 milliGRAM(s) IV Push every 6 hours PRN Agitation  sodium chloride 0.9% lock flush 10 milliLiter(s) IV Push every 1 hour PRN Pre/post blood products, medications, blood draw, and to maintain line patency  sodium chloride 0.9% lock flush 10 milliLiter(s) IV Push every 1 hour PRN Pre/post blood products, medications, blood draw, and to maintain line patency      Physical Exam    Neuro :  no focal deficits  Respiratory: CTA B/L  CV: RRR, S1S2, no murmurs,   Abdominal: Soft, NT, ND +BS,  Extremities: No edema, + peripheral pulses    ASSESSMENT    Hypoxemia    No pertinent past medical history    No significant past surgical history    S/P moody    S/P appendectomy        PLAN     Patient is a 56y old  Male who presents with a chief complaint of SOB (09 May 2021 02:13)    pt seen in icu [ x ], reg med floor [   ], bed [ x ], chair at bedside [   ], awake and responsive [ x ], mildly sedated, [  ],    nad [x  ]        Allergies    No Known Allergies        Vitals    T(F): 98.9 (05-09-21 @ 05:25), Max: 98.9 (05-09-21 @ 05:25)  HR: 153 (05-09-21 @ 06:00) (61 - 163)  BP: 127/63 (05-09-21 @ 06:00) (78/43 - 161/76)  RR: 20 (05-09-21 @ 06:00) (20 - 39)  SpO2: 94% (05-09-21 @ 06:00) (88% - 100%)  Wt(kg): --  CAPILLARY BLOOD GLUCOSE      POCT Blood Glucose.: 111 mg/dL (09 May 2021 06:11)      Labs                          7.9    10.37 )-----------( 342      ( 09 May 2021 06:20 )             25.5       05-09    139  |  99  |  12  ----------------------------<  101<H>  3.9   |  41<H>  |  0.24<L>    Ca    8.1<L>      09 May 2021 06:20  Phos  2.9     05-09  Mg     2.0     05-09    TPro  6.2  /  Alb  2.5<L>  /  TBili  0.8  /  DBili  x   /  AST  15  /  ALT  19  /  AlkPhos  80  05-09            .Sputum Sputum  04-16 @ 04:42   Moderate Enterobacter aerogenes (Carbapenem Resistant)  Normal Respiratory Monserrat present  --  Enterobacter aerogenes (Carbapenem Resistant)      .Urine Clean Catch (Midstream)  03-15 @ 00:52   No growth  --  --          Radiology Results      Meds    MEDICATIONS  (STANDING):  albumin human 25% IVPB 50 milliLiter(s) IV Intermittent every 8 hours  ascorbic acid 1000 milliGRAM(s) Oral daily  BACItracin   Ointment 1 Application(s) Topical two times a day  chlorhexidine 0.12% Liquid 15 milliLiter(s) Oral Mucosa every 12 hours  chlorhexidine 2% Cloths 1 Application(s) Topical <User Schedule>  clonazePAM  Tablet 2 milliGRAM(s) Oral every 8 hours  dexMEDEtomidine Infusion 0.2 MICROgram(s)/kG/Hr (4.2 mL/Hr) IV Continuous <Continuous>  enoxaparin Injectable 40 milliGRAM(s) SubCutaneous every 24 hours  furosemide   Injectable 40 milliGRAM(s) IV Push every 12 hours  insulin lispro (ADMELOG) corrective regimen sliding scale   SubCutaneous every 6 hours  methadone    Tablet 5 milliGRAM(s) Oral every 8 hours  midodrine 10 milliGRAM(s) Oral every 8 hours  OLANZapine 10 milliGRAM(s) Oral every 12 hours  pantoprazole  Injectable 40 milliGRAM(s) IV Push every 12 hours  polyethylene glycol 3350 17 Gram(s) Oral two times a day  predniSONE   Tablet 40 milliGRAM(s) Oral daily  tamsulosin 0.4 milliGRAM(s) Oral at bedtime  trimethoprim  160 mG/sulfamethoxazole 800 mG 1 Tablet(s) Oral <User Schedule>      MEDICATIONS  (PRN):  acetaminophen    Suspension .. 650 milliGRAM(s) Oral every 12 hours PRN Temp greater or equal to 38C (100.4F)  ALBUTerol    90 MICROgram(s) HFA Inhaler 2 Puff(s) Inhalation every 6 hours PRN Shortness of Breath and/or Wheezing  artificial  tears Solution 1 Drop(s) Both EYES every 4 hours PRN Dry Eyes  LORazepam   Injectable 2 milliGRAM(s) IV Push every 6 hours PRN Agitation  sodium chloride 0.9% lock flush 10 milliLiter(s) IV Push every 1 hour PRN Pre/post blood products, medications, blood draw, and to maintain line patency  sodium chloride 0.9% lock flush 10 milliLiter(s) IV Push every 1 hour PRN Pre/post blood products, medications, blood draw, and to maintain line patency      Physical Exam      Neuro :  no focal deficits  Respiratory: CTA B/L  CV: RRR, S1S2, no murmurs,   Abdominal: Soft, NT, ND +BS, g- tube in place, dressing cdi  Extremities: b/l ue edema, + peripheral pulses      ASSESSMENT    Hypoxemia 2nd to covid pna   transaminitis  prediabetes  h/o appendectomy  cholecystectomy        PLAN    contact and airborne isolation  d/c remdesevir given covid ab positive noted   completed dexamethasone   started pulse steroids for 3 days - 250mg solumedrol bid now tapered off 4/14/21   C/w prednisone 40 daily for possible organizing pneumonia   Started on bactrim empirically, TIW  cont asa, vit c,    cont albuterol inhaler   pulm f/u  procalcitonin, D-dimer, crp, ldh, ferritin, lactate noted ,    afebrile   cont tylenol prn,   cont robitussin prn   pt on precedex drip    pt on fentanyl patch  off pressors   s/p intubation 3/29/21   s/p tracheostomy 4/21/21  O2 sat 94% (88% - 100%) mech vent 40%  O2 via mech vent and taper fio2 as tolerated   vent mgmt as per icu  xray 3/19/21 with pneumomediastinum  rept cxr with New trace right apical pneumothorax. New mild left apical pneumothorax. Grossly stable small pneumomediastinum.  Soft tissue emphysema at the neck bases bilaterally. Grossly stable bilateral pulmonary infiltrates noted.   cxr 2/24 with No evidence of pneumothorax can be appreciated on the available image. This may be related to patient positioning. Evidence of pneumomediastinum and subcutaneous emphysema in the lower neck is again noted. There are patchy bibasilar infiltrates and elevated right hemidiaphragm noted.   cxr 3/29/21 with No significant change bilateral infiltrates. There is a small simple left apical pneumothorax. No significant pleural effusion. Bilateral subcutaneous emphysema similar to prior.   XRs on 7AM and 5PM with no obvious increase of ptx  cxr 4/4/21 with Improving bilateral airspace disease noted    cxr 4/8/21 with Small left pneumothorax noted.  thoracic surg f/u   s/p L chest tube replacement 4/18/21 for recurrence of left pneumothorax   cxr 4/20/21 with Unchanged advanced infiltrates and catheter left chest tube noted   CXR 4/11/21 with : No interval change compared to one day prior. No pneumothorax noted.   unable to flush or aspirate tube fully, noted debris in tubing which was milked out.   Once material removed from tubing able to aspirated and flush fully. Also changed dressing on pigtail which appears to be in good position.   Monitor O2 status   s/p tracheostomy 4/21/21   stay sutures out 2 weeks from OR date  cxr 4/21/21 with No evidence of active chest disease. Tracheostomy tube in place otherwise no significant change noted   cxr 4/25/21 with A 40% LEFT pneumothorax. LEFT multi-sidehole pigtail catheter overlies LEFT lower hemithorax.. Bilateral multifocal and diffuse ill-defined airspace opacities..  Follow-up AP portable chest radiograph 4/25/2021 AT 8:58 AM: Residual LEFT 30% upper zone pneumothorax. Otherwise no interval change.noted    cxr 4/30/21 Tracheostomy tube again noted. Left chest tube noted with small left apical pneumothorax unchanged. Bilateral airspace opacities overall worsening. Heart size cannot be accurately assessed in this projection noted.   cxr 5/3/21 with Large left pneumothorax noted above.   cxr 5 4/21 with No significant interval change noted above.   ct chest with Extensive chronic appearing consolidative opacities throughout the lungs, most prominent in the left lower lobe and lingula, with bronchiectasis, scarring, and volume loss. Additional scattered peripheral coarse opacities.   Mild left pneumothorax. Left lateral pleural space pigtail catheter, not in continuity with the pneumothorax. Mild bilateral hydronephrosis, similar to appearance on CT of the abdomen and pelvis on April 27. noted above   s/p 2nd chest tube 5/5/21  cxr 5/6/21 with Tracheostomy and catheter left chest tubes remain. , There are significant diffuse advanced infiltrates again noted. No pneumothorax. Above findings are similar to study earlier in the day. Present film shows a left jugular line inserted   with tip entering the superior vena cava noted above.  s/p surgical placement of gastrostomy tube 4/23/2021.   cont on tube feeding  id f/u   No need for further antibiotics as patient completed course of Meropenem  leukocytosis resolved  speutum cx with Enterobacter aerogenes (Carbapenem Resistant) noted above  andrea   lispro ss   prognosis poor   s/p 4 units prbc for anemia  f/u h/h    transfuse prbc as needed  check vitamin b12  heme onc f/u  retic is elevated.    Haptoglobin normal  ? daily phlebotomy  no hemolysis, Fe/B12/folate adequate  hemolysis is unlikely even his baseline haptoglobin may be very elevated due to COVID  direct pamella neg noted above   COVID is known to cause cold agglutinin  cont current meds  mgmt as per icu

## 2021-12-10 NOTE — PROGRESS NOTES
Discharging patient home per MD order. Pt demonstrated understanding of discharge instructions, follow up appointments, home medications, prescriptions. Pt is voiding without difficulty, pain well controlled, tolerating oral medications, O2 greater than 90%. Pt verbalized understanding of discharge instructions and educational handouts and all questions answered. PIV removed  Pt discharged off unit with hospital escort.

## 2021-12-10 NOTE — CARE PLAN
The patient is     Shift Goals  Clinical Goals: Safety, manage ETOH withdrawal symptoms  Patient Goals: [facility]  Family Goals: No family present  Progress made toward(s) clinical / shift goals:     Patient is not progressing towards the following goals:

## 2021-12-10 NOTE — CARE PLAN
Problem: Pain - Standard  Goal: Alleviation of pain or a reduction in pain to the patient’s comfort goal  Outcome: Progressing     Problem: Knowledge Deficit - Standard  Goal: Patient and family/care givers will demonstrate understanding of plan of care, disease process/condition, diagnostic tests and medications  Outcome: Progressing     Problem: Optimal Care for Alcohol Withdrawal  Goal: Optimal Care for the alcohol withdrawal patient  Outcome: Progressing     Problem: Seizure Precautions  Goal: Implementation of seizure precautions  Outcome: Progressing     Problem: Lifestyle Changes  Goal: Patient's ability to identify lifestyle changes and available resources to help reduce recurrence of condition will improve  Outcome: Progressing     Problem: Psychosocial  Goal: Patient's level of anxiety will decrease  Outcome: Progressing     Problem: Fall Risk  Goal: Patient will remain free from falls  Outcome: Progressing   The patient is Stable - Low risk of patient condition declining or worsening    Shift Goals  Clinical Goals: pt saftey   Patient Goals: rest comfortably   Family Goals: No family present    Progress made toward(s) clinical / shift goals:  all goals    Patient is not progressing towards the following goals:

## 2021-12-10 NOTE — PROGRESS NOTES
Received bedside report. Assumed pt care. Assessment and chart check complete. Pt is [id number], no signs of distress, denies nausea/vomiting. Reports pain, medicated per MAR. On CIWA protocol. Ambulating to hallway with steady gait, tolerating current diet.  Fall precautions in place, treaded socks on pt, bed in low position. Call light within reach. Educated pt to call if needing anything. Hourly rounding.

## 2021-12-10 NOTE — DISCHARGE INSTRUCTIONS
Discharge Instructions    Discharged to home by car with relative. Discharged via wheelchair, hospital escort: Yes.  Special equipment needed: Not Applicable    Be sure to schedule a follow-up appointment with your primary care doctor or any specialists as instructed.     Discharge Plan:   Influenza Vaccine Indication: Patient Refuses    I understand that a diet low in cholesterol, fat, and sodium is recommended for good health. Unless I have been given specific instructions below for another diet, I accept this instruction as my diet prescription.   Other diet: Regular      Special Instructions: None    · Is patient discharged on Warfarin / Coumadin?   No     Depression / Suicide Risk    As you are discharged from this [facility] facility, it is important to learn how to keep safe from harming yourself.    Recognize the warning signs:  · Abrupt changes in personality, positive or negative- including increase in energy   · Giving away possessions  · Change in eating patterns- significant weight changes-  positive or negative  · Change in sleeping patterns- unable to sleep or sleeping all the time   · Unwillingness or inability to communicate  · Depression  · Unusual sadness, discouragement and loneliness  · Talk of wanting to die  · Neglect of personal appearance   · Rebelliousness- reckless behavior  · Withdrawal from people/activities they love  · Confusion- inability to concentrate     If you or a loved one observes any of these behaviors or has concerns about self-harm, here's what you can do:  · Talk about it- your feelings and reasons for harming yourself  · Remove any means that you might use to hurt yourself (examples: pills, rope, extension cords, firearm)  · Get professional help from the community (Mental Health, Substance Abuse, psychological counseling)  · Do not be alone:Call your Safe Contact- someone whom you trust who will be there for you.  · Call your local CRISIS HOTLINE 222-1540 or  839.797.8643  · Call your local Children's Mobile Crisis Response Team [place] (220) 701-5664 or www.itsDapper  · Call the toll free National Suicide Prevention Hotlines   · National Suicide Prevention Lifeline 739-542-WCDA (5741)  · GetSnippy Line Network 800-SUICIDE (353-3456)      Alcohol Withdrawal Syndrome  When a person who drinks a lot of alcohol stops drinking, he or she may have unpleasant and serious symptoms. These symptoms are called alcohol withdrawal syndrome. This condition may be mild or severe. It can be life-threatening. It can cause:  · Shaking that you cannot control (tremor).  · Sweating.  · Headache.  · Feeling fearful, upset, grouchy, or depressed.  · Trouble sleeping (insomnia).  · Nightmares.  · Fast or uneven heartbeats (palpitations).  · Alcohol cravings.  · Feeling sick to your stomach (nausea).  · Throwing up (vomiting).  · Being bothered by light and sounds.  · Confusion.  · Trouble thinking clearly.  · Not being hungry (loss of appetite).  · Big changes in mood (mood swings).  If you have all of the following symptoms at the same time, get help right away:  · High blood pressure.  · Fast heartbeat.  · Trouble breathing.  · Seizures.  · Seeing, hearing, feeling, smelling, or tasting things that are not there (hallucinations).  These symptoms are known as delirium tremens (DTs). They must be treated at the hospital right away.  Follow these instructions at home:    · Take over-the-counter and prescription medicines only as told by your doctor. This includes vitamins.  · Do not drink alcohol.  · Do not drive until your doctor says that this is safe for you.  · Have someone stay with you or be available in case you need help. This should be someone you trust. This person can help you with your symptoms. He or she can also help you to not drink.  · Drink enough fluid to keep your pee (urine) pale yellow.  · Think about joining a support group or a treatment program to help  you stop drinking.  · Keep all follow-up visits as told by your doctor. This is important.  Contact a doctor if:  · Your symptoms get worse.  · You cannot eat or drink without throwing up.  · You have a hard time not drinking alcohol.  · You cannot stop drinking alcohol.  Get help right away if:  · You have fast or uneven heartbeats.  · You have chest pain.  · You have trouble breathing.  · You have a seizure for the first time.  · You see, hear, feel, smell, or taste something that is not there.  · You get very confused.  Summary  · When a person who drinks a lot of alcohol stops drinking, he or she may have serious symptoms. This is called alcohol withdrawal syndrome.  · Delirium tremens (DTs) is a group of life-threatening symptoms. You should get help right away if you have these symptoms.  · Think about joining an alcohol support group or a treatment program.  This information is not intended to replace advice given to you by your health care provider. Make sure you discuss any questions you have with your health care provider.  Document Released: 06/05/2009 Document Revised: 11/30/2018 Document Reviewed: 08/24/2018  Elsevier Patient Education © 2020 [employer]

## 2021-12-10 NOTE — PROGRESS NOTES
Pt arrived from ICU report received pt resting comfortably at this time. No pt needs or questions at this time.

## 2021-12-10 NOTE — CARE PLAN
Problem: Nutritional:  Goal: Achieve adequate nutritional intake  Description: Initiate diet and advance beyond clears.Patient will consume >50% of meals  Outcome: Progressing    Pt is regular diet now with PO intake % of 3 meals. Refeeding labs WNL today and rally bag on MAR. Will order Phos and Mag to monitor refeeding risk.    RD following.

## 2021-12-10 NOTE — CARE PLAN
The patient is Stable - Low risk of patient condition declining or worsening    Shift Goals  Clinical Goals: pain control, for discharge  Patient Goals: rest and comfort  Family Goals: No family present    Progress made toward(s) clinical / shift goals: Given oxycodone 5 mg for pain. Reassess if needed.    Patient is not progressing towards the following goals:      Problem: Pain - Standard  Goal: Alleviation of pain or a reduction in pain to the patient’s comfort goal  Outcome: Progressing     Problem: Optimal Care for Alcohol Withdrawal  Goal: Optimal Care for the alcohol withdrawal patient  Outcome: Progressing     Problem: Psychosocial  Goal: Patient's level of anxiety will decrease  Outcome: Progressing     Problem: Fall Risk  Goal: Patient will remain free from falls  Outcome: Progressing
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
## 2021-12-10 NOTE — DISCHARGE SUMMARY
"Discharge Summary    CHIEF COMPLAINT ON ADMISSION  Chief Complaint   Patient presents with   • Loss of Appetite     x 2 days   • Rapid Heart Beat     Pt states is unabel to sleep r/t \"keep waking up w/ my heart pounding\"       Reason for Admission  Shortness of Breath, Arm Numbness,*     Admission Date  12/7/2021    CODE STATUS  Full Code    HPI & HOSPITAL COURSE  This is a 31 y.o. female with past medical history of alcohol abuse presenting with weakness and palpitations.  She had endorsed having abruptly stopped drinking alcohol 2 days ago.  While in ER she experienced a seizure and found to have jaw dislocation that was reduced by ERP.  She was admitted to ICU and started on Precedex drip and Librium.  She was able to be weaned off of drip and has not required any supplemental Ativan.  She has been ambulating and eating and her CIWA scores have been less than 3.  She was seen by psychiatry prior to discharge and provided appropriate resources.      Therefore, she is discharged in good and stable condition to home with close outpatient follow-up.    The patient met 2-midnight criteria for an inpatient stay at the time of discharge.    Discharge Date  12/10/21    FOLLOW UP ITEMS POST DISCHARGE  none    DISCHARGE DIAGNOSES  Principal Problem:    Alcohol dependence with withdrawal (HCC) POA: Yes  Active Problems:    Hypokalemia POA: Yes    Thrombocytopenia (HCC) POA: Yes    Seizure (HCC) POA: Yes    Acute cystitis without hematuria POA: Yes    Acute encephalopathy POA: Yes    Transaminitis POA: Yes      Overview: Most likely due to ETOH chronic use      Recommend abstinence from ETOH      Will avoid hepatotoxic agents.  Resolved Problems:    * No resolved hospital problems. *      FOLLOW UP  No future appointments.  No follow-up provider specified.    MEDICATIONS ON DISCHARGE     Medication List      ASK your doctor about these medications      Instructions   ibuprofen 200 MG Tabs  Commonly known as: MOTRIN   Take " 200 mg by mouth every 6 hours as needed.  Dose: 200 mg            Allergies  Allergies   Allergen Reactions   • Other Food      seafood       DIET  Orders Placed This Encounter   Procedures   • Diet Order Diet: Regular     Standing Status:   Standing     Number of Occurrences:   1     Order Specific Question:   Diet:     Answer:   Regular [1]       ACTIVITY  As tolerated.  Weight bearing as tolerated    CONSULTATIONS  Critical care  psychiatry    PROCEDURES  none    LABORATORY  Lab Results   Component Value Date    SODIUM 138 12/09/2021    POTASSIUM 3.7 12/10/2021    CHLORIDE 107 12/09/2021    CO2 19 (L) 12/09/2021    GLUCOSE 98 12/09/2021    BUN 2 (L) 12/09/2021    CREATININE 0.25 (L) 12/09/2021        Lab Results   Component Value Date    WBC 4.7 (L) 12/08/2021    HEMOGLOBIN 11.8 (L) 12/08/2021    HEMATOCRIT 35.1 (L) 12/08/2021    PLATELETCT 41 (LL) 12/08/2021        Total time of the discharge process exceeds 33 minutes.

## 2021-12-10 NOTE — PROGRESS NOTES
Called report to trevin for room 210 1,  Took patient down to above room per wheelchair with all or her belongings and assisted pt into bed and left belongings in cabinet at bedside.  Notified trevin of patient arrival to floor.  patient voiced no complaints and was very nice.

## 2021-12-31 NOTE — DOCUMENTATION QUERY
[facility]                                                                       Query Response Note      PATIENT:               LADARIUS DAY  ACCT #:                  [id number]  MRN:                     [id number]  :                      1990  ADMIT DATE:       2021 11:05 AM  DISCH DATE:        12/10/2021 3:11 PM  RESPONDING  PROVIDER #:        [id number]           QUERY TEXT:    A dislocation is noted in the Medical Record.  More specification is needed to code this procedures with ICD-10 PCS code for Inpatient status.   Please specify the side of  the dislocated mandible.    NOTE:  If an appropriate response is not listed below, please respond with a new note.      The patient's Clinical Indicators include:  Clinical indicators  Per ED note: On reassessment she is noted have a contusion on her forehead and what appears to be an anterior dislocation of her mandible. Per H&P She was also found to have jaw dislocation that was reduced by the emergency physician    Treatment or Monitoring  Head CT is reassuring mandible is dislocated on CT. No fractures are identified.   Patient is sedated with propofol.  Mandible is reduced in typical fashion without complication    Risk Factors  She has a great deal of jaw pain. Closed dislocation of jaw, initial encounter    [employer] contact information  [email or web address]  Options provided:   -- Right   -- Left   -- Bilateral      Query created by: Isabell Carney on 2021 6:05 AM    RESPONSE TEXT:    Bilateral          Electronically signed by:  KIRK YOUNG MD 2021 7:38 AM

## 2022-08-18 ENCOUNTER — HOSPITAL ENCOUNTER (EMERGENCY)
Facility: MEDICAL CENTER | Age: 32
End: 2022-08-18
Attending: OBSTETRICS & GYNECOLOGY | Admitting: OBSTETRICS & GYNECOLOGY
Payer: MEDICAID

## 2022-08-18 VITALS
TEMPERATURE: 97.3 F | HEART RATE: 90 BPM | WEIGHT: 137 LBS | DIASTOLIC BLOOD PRESSURE: 65 MMHG | SYSTOLIC BLOOD PRESSURE: 107 MMHG | BODY MASS INDEX: 21.5 KG/M2 | HEIGHT: 67 IN | RESPIRATION RATE: 18 BRPM

## 2022-08-18 LAB
A1 MICROGLOB PLACENTAL VAG QL: NEGATIVE
FIBRONECTIN FETAL SPEC QL: NEGATIVE

## 2022-08-18 PROCEDURE — 99284 EMERGENCY DEPT VISIT MOD MDM: CPT

## 2022-08-18 PROCEDURE — 84112 EVAL AMNIOTIC FLUID PROTEIN: CPT

## 2022-08-18 PROCEDURE — 59025 FETAL NON-STRESS TEST: CPT

## 2022-08-18 PROCEDURE — 82731 ASSAY OF FETAL FIBRONECTIN: CPT

## 2022-08-18 ASSESSMENT — FIBROSIS 4 INDEX: FIB4 SCORE: 11.95

## 2022-08-18 NOTE — PROGRESS NOTES
EDC    10/25/2022   EGA     30w2d    1209: TOCO/US applied, pt educated. Pt presents with c/o lower abdominal cramping that started a few days ago. Pt states the abdominal cramping isn't as bad today as it was yesterday. Pt also states she has had lots of vaginal discharge and occasionally has noticed clear fluid on her underwear. Pt is unsure if her water is broken. Pt states she had sexual intercourse over 4 days ago. Pt declines VB, and states +FM. POC discussed with patient, all questions and concerns addressed.     1239: Dr. Martel updated with patient's status, orders received to collect fFN and amnisure.     1252: Speculum exam done, lots of vaginal discharge noted, no pooling of amniotic fluid, amnisure and fFN collected.     1403: Dr. Martel at bedside, SVE closed, orders received to discharge patient home.     1420: Discharge instructions gone over with patient, all questions and concerns addressed.

## 2022-08-18 NOTE — ED PROVIDER NOTES
"DATE OF ADMISSION:  2022     OBSERVATION HISTORY AND PHYSICAL     IDENTIFICATION:  This is a 32-year-old  2, para 1-0-0-1 with an EDC of   10/25/2022, EGA of 30 and 2/7th weeks who presents with a chief complaint of   \"I think I am kenya.\"     HISTORY OF PRESENT ILLNESS:  The patient of Dr. Saucedo's, who has gotten good   prenatal care.  Her prenatal care has been uncomplicated.  She smokes a   cigarette a day.  She does have a history of a DVT in 2017.  She is on Lovenox   40 daily.  She [facility].  NIPT was low risk   with this pregnancy.  She presents today complaining of uterine contractions   more than 10-hour and vaginal discharge, concern about being ruptured or in    labor.  Here in labor and delivery, fetal heart tracings reactive,   category 1.  She is having occasional irregular irritability, but no   contractions.  Her AmniSure was negative.  Her FFN is negative.  Her cervix   was long, thick and closed.  She does have followup with Dr. Saucedo on the   .  She has no other complaints.  Denies nausea, vomiting, fever, chills,   change in bowel or bladder habits.  Admits to good fetal movement.  Denies   symptoms of PIH, headaches, visual changes, epigastric pain, nondependent   edema.     OBSTETRICAL HISTORY:  Significant for previous vaginal delivery.     GYNECOLOGIC HISTORY:  Denies STDs, abnormal Paps.     MEDICAL HISTORY:  ALLERGIES:  None.     CURRENT MEDICATIONS:  Prenatal vitamins, Lovenox, Wellbutrin.     MEDICAL PROBLEMS:  1.  DVT history.  2.  Anxiety, depression.     SURGICAL HISTORY:  None.     SOCIAL HISTORY:  Denies alcohol, tobacco or drug abuse.     FAMILY HISTORY:  Noncontributory.     REVIEW OF SYSTEMS:  Times 12 is negative per AMA standards available in chart.     LABORATORY DATA:  She is O positive. FFN is negative.  AmniSure is negative.     PHYSICAL EXAMINATION:  VITAL SIGNS:  The patient is afebrile.  Vital signs within " normal limits.    Fetal heart tracings reactive, category 1.  She is not kenya.  GENERAL:  She is awake, alert, in no apparent distress.  NECK:  Supple.  HEART:  Regular.  CHEST:  Clear.  BREASTS:  Symmetrical.  ABDOMEN:  Soft, gravid, size appropriate for dates.  EXTREMITIES:  Negative.  GYNECOLOGIC:  As above.     ASSESSMENT:  At this time:  1.  Pregnancy at 30 and 2/7th weeks.  2.  Rule out  labor with Dunklin Bentley contractions.  3.  Fetal status reassuring.  4.  Personal history of deep venous thrombosis, currently stable on Lovenox.     PLAN:  At this time:  1.  Discharge home.  2.  Follow up with Dr. Saucedo on the  as planned.  3.  Kick counts,  labor precautions.  4.  Continue Lovenox as planned.  5.  Discontinue tobacco.        ______________________________  MD SD Pollock/CRISTA/KADEN    DD:  2022 14:08  DT:  2022 14:52    Job#:  [id number]

## 2022-09-22 ENCOUNTER — HOSPITAL ENCOUNTER (EMERGENCY)
Facility: MEDICAL CENTER | Age: 32
End: 2022-09-22
Attending: OBSTETRICS & GYNECOLOGY | Admitting: OBSTETRICS & GYNECOLOGY
Payer: MEDICAID

## 2022-09-22 VITALS
DIASTOLIC BLOOD PRESSURE: 66 MMHG | HEART RATE: 91 BPM | RESPIRATION RATE: 15 BRPM | TEMPERATURE: 97.5 F | HEIGHT: 67 IN | BODY MASS INDEX: 22.76 KG/M2 | WEIGHT: 145 LBS | SYSTOLIC BLOOD PRESSURE: 112 MMHG

## 2022-09-22 LAB — A1 MICROGLOB PLACENTAL VAG QL: NEGATIVE

## 2022-09-22 PROCEDURE — 84112 EVAL AMNIOTIC FLUID PROTEIN: CPT

## 2022-09-22 PROCEDURE — 99282 EMERGENCY DEPT VISIT SF MDM: CPT | Performed by: OBSTETRICS & GYNECOLOGY

## 2022-09-22 PROCEDURE — 99284 EMERGENCY DEPT VISIT MOD MDM: CPT

## 2022-09-22 PROCEDURE — 59025 FETAL NON-STRESS TEST: CPT

## 2022-09-22 ASSESSMENT — FIBROSIS 4 INDEX: FIB4 SCORE: 11.95

## 2022-09-22 NOTE — PROGRESS NOTES
1011 - Patient of Dr. Saucedo presents with c/o SROM.  Ba Gestation today at 35.5 weeks    Reports random occasional contractions or cramping, Denies problems with Pregnancy, denies bleeding. Reports noting an increase in wetness on her underpants this am - reports she is used to increased vaginal discharge but this fluid seemed more watery to her. Denies change to vision/edema/HA, Reports FM. FM/TOCO use discussed and placed, POC discussed.     SSE reveals no fluid non the perineum/linen, no pooling noted, insufficient sample collected - deferred to amnisure. Amnisure sent to the lab.     Dry erase board updated with RN contact information; reviewed.   Patient encouraged to call RN with all questions concerns needs prn.    Report to Dr. NICOLE Pittman regarding patient arrival/complaint/status.     1100 - Physician at the  assessing complaint, status and discussion regarding POC to discharge home. Patient discharged home with specific instruction to return to L&D/Physician ie.. Bleeding/ROM/decreased FM/labor/concerns for self or baby.

## 2022-10-13 ENCOUNTER — ANESTHESIA EVENT (OUTPATIENT)
Dept: ANESTHESIOLOGY | Facility: MEDICAL CENTER | Age: 32
End: 2022-10-13
Payer: MEDICAID

## 2022-10-13 ENCOUNTER — APPOINTMENT (OUTPATIENT)
Dept: OBGYN | Facility: MEDICAL CENTER | Age: 32
End: 2022-10-13
Attending: OBSTETRICS & GYNECOLOGY
Payer: MEDICAID

## 2022-10-13 ENCOUNTER — HOSPITAL ENCOUNTER (INPATIENT)
Facility: MEDICAL CENTER | Age: 32
LOS: 2 days | End: 2022-10-15
Attending: OBSTETRICS & GYNECOLOGY | Admitting: OBSTETRICS & GYNECOLOGY
Payer: MEDICAID

## 2022-10-13 ENCOUNTER — ANESTHESIA (OUTPATIENT)
Dept: ANESTHESIOLOGY | Facility: MEDICAL CENTER | Age: 32
End: 2022-10-13
Payer: MEDICAID

## 2022-10-13 DIAGNOSIS — G89.18 POSTOPERATIVE PAIN: ICD-10-CM

## 2022-10-13 LAB
BASOPHILS # BLD AUTO: 0.2 % (ref 0–1.8)
BASOPHILS # BLD: 0.02 K/UL (ref 0–0.12)
EOSINOPHIL # BLD AUTO: 0.06 K/UL (ref 0–0.51)
EOSINOPHIL NFR BLD: 0.5 % (ref 0–6.9)
ERYTHROCYTE [DISTWIDTH] IN BLOOD BY AUTOMATED COUNT: 46.2 FL (ref 35.9–50)
HCT VFR BLD AUTO: 35.8 % (ref 37–47)
HGB BLD-MCNC: 12.3 G/DL (ref 12–16)
HOLDING TUBE BB 8507: NORMAL
IMM GRANULOCYTES # BLD AUTO: 0.14 K/UL (ref 0–0.11)
IMM GRANULOCYTES NFR BLD AUTO: 1.2 % (ref 0–0.9)
LYMPHOCYTES # BLD AUTO: 2.56 K/UL (ref 1–4.8)
LYMPHOCYTES NFR BLD: 21.4 % (ref 22–41)
MCH RBC QN AUTO: 32.2 PG (ref 27–33)
MCHC RBC AUTO-ENTMCNC: 34.4 G/DL (ref 33.6–35)
MCV RBC AUTO: 93.7 FL (ref 81.4–97.8)
MONOCYTES # BLD AUTO: 1.6 K/UL (ref 0–0.85)
MONOCYTES NFR BLD AUTO: 13.4 % (ref 0–13.4)
NEUTROPHILS # BLD AUTO: 7.6 K/UL (ref 2–7.15)
NEUTROPHILS NFR BLD: 63.3 % (ref 44–72)
NRBC # BLD AUTO: 0 K/UL
NRBC BLD-RTO: 0 /100 WBC
PLATELET # BLD AUTO: 221 K/UL (ref 164–446)
PMV BLD AUTO: 10 FL (ref 9–12.9)
RBC # BLD AUTO: 3.82 M/UL (ref 4.2–5.4)
T PALLIDUM AB SER QL IA: NORMAL
WBC # BLD AUTO: 12 K/UL (ref 4.8–10.8)

## 2022-10-13 PROCEDURE — A9270 NON-COVERED ITEM OR SERVICE: HCPCS | Performed by: OBSTETRICS & GYNECOLOGY

## 2022-10-13 PROCEDURE — 770002 HCHG ROOM/CARE - OB PRIVATE (112)

## 2022-10-13 PROCEDURE — 86780 TREPONEMA PALLIDUM: CPT

## 2022-10-13 PROCEDURE — 700101 HCHG RX REV CODE 250: Performed by: ANESTHESIOLOGY

## 2022-10-13 PROCEDURE — 303615 HCHG EPIDURAL/SPINAL ANESTHESIA FOR LABOR

## 2022-10-13 PROCEDURE — 700102 HCHG RX REV CODE 250 W/ 637 OVERRIDE(OP): Performed by: OBSTETRICS & GYNECOLOGY

## 2022-10-13 PROCEDURE — 36415 COLL VENOUS BLD VENIPUNCTURE: CPT

## 2022-10-13 PROCEDURE — 700111 HCHG RX REV CODE 636 W/ 250 OVERRIDE (IP)

## 2022-10-13 PROCEDURE — 700111 HCHG RX REV CODE 636 W/ 250 OVERRIDE (IP): Performed by: OBSTETRICS & GYNECOLOGY

## 2022-10-13 PROCEDURE — 10907ZC DRAINAGE OF AMNIOTIC FLUID, THERAPEUTIC FROM PRODUCTS OF CONCEPTION, VIA NATURAL OR ARTIFICIAL OPENING: ICD-10-PCS | Performed by: OBSTETRICS & GYNECOLOGY

## 2022-10-13 PROCEDURE — 3E033VJ INTRODUCTION OF OTHER HORMONE INTO PERIPHERAL VEIN, PERCUTANEOUS APPROACH: ICD-10-PCS | Performed by: OBSTETRICS & GYNECOLOGY

## 2022-10-13 PROCEDURE — 85025 COMPLETE CBC W/AUTO DIFF WBC: CPT

## 2022-10-13 PROCEDURE — 700111 HCHG RX REV CODE 636 W/ 250 OVERRIDE (IP): Performed by: ANESTHESIOLOGY

## 2022-10-13 PROCEDURE — 01967 NEURAXL LBR ANES VAG DLVR: CPT | Performed by: ANESTHESIOLOGY

## 2022-10-13 RX ORDER — TERBUTALINE SULFATE 1 MG/ML
0.25 INJECTION, SOLUTION SUBCUTANEOUS
Status: DISCONTINUED | OUTPATIENT
Start: 2022-10-13 | End: 2022-10-14

## 2022-10-13 RX ORDER — SODIUM CHLORIDE, SODIUM LACTATE, POTASSIUM CHLORIDE, CALCIUM CHLORIDE 600; 310; 30; 20 MG/100ML; MG/100ML; MG/100ML; MG/100ML
1000 INJECTION, SOLUTION INTRAVENOUS CONTINUOUS
Status: ACTIVE | OUTPATIENT
Start: 2022-10-13 | End: 2022-10-14

## 2022-10-13 RX ORDER — ALUMINA, MAGNESIA, AND SIMETHICONE 2400; 2400; 240 MG/30ML; MG/30ML; MG/30ML
30 SUSPENSION ORAL EVERY 6 HOURS PRN
Status: DISCONTINUED | OUTPATIENT
Start: 2022-10-13 | End: 2022-10-14 | Stop reason: HOSPADM

## 2022-10-13 RX ORDER — LIDOCAINE HYDROCHLORIDE AND EPINEPHRINE 15; 5 MG/ML; UG/ML
INJECTION, SOLUTION EPIDURAL
Status: COMPLETED | OUTPATIENT
Start: 2022-10-13 | End: 2022-10-13

## 2022-10-13 RX ORDER — BUPIVACAINE HYDROCHLORIDE 2.5 MG/ML
INJECTION, SOLUTION EPIDURAL; INFILTRATION; INTRACAUDAL
Status: COMPLETED | OUTPATIENT
Start: 2022-10-13 | End: 2022-10-13

## 2022-10-13 RX ORDER — LIDOCAINE HYDROCHLORIDE 10 MG/ML
20 INJECTION, SOLUTION INFILTRATION; PERINEURAL
Status: DISCONTINUED | OUTPATIENT
Start: 2022-10-13 | End: 2022-10-14

## 2022-10-13 RX ORDER — ONDANSETRON 4 MG/1
4 TABLET, ORALLY DISINTEGRATING ORAL EVERY 6 HOURS PRN
Status: DISCONTINUED | OUTPATIENT
Start: 2022-10-13 | End: 2022-10-14 | Stop reason: HOSPADM

## 2022-10-13 RX ORDER — DEXTROSE, SODIUM CHLORIDE, SODIUM LACTATE, POTASSIUM CHLORIDE, AND CALCIUM CHLORIDE 5; .6; .31; .03; .02 G/100ML; G/100ML; G/100ML; G/100ML; G/100ML
INJECTION, SOLUTION INTRAVENOUS CONTINUOUS
Status: DISCONTINUED | OUTPATIENT
Start: 2022-10-14 | End: 2022-10-15 | Stop reason: HOSPADM

## 2022-10-13 RX ORDER — ONDANSETRON 2 MG/ML
4 INJECTION INTRAMUSCULAR; INTRAVENOUS EVERY 6 HOURS PRN
Status: DISCONTINUED | OUTPATIENT
Start: 2022-10-13 | End: 2022-10-14 | Stop reason: HOSPADM

## 2022-10-13 RX ORDER — ACETAMINOPHEN 500 MG
1000 TABLET ORAL
Status: DISCONTINUED | OUTPATIENT
Start: 2022-10-13 | End: 2022-10-14 | Stop reason: HOSPADM

## 2022-10-13 RX ORDER — ROPIVACAINE HYDROCHLORIDE 2 MG/ML
INJECTION, SOLUTION EPIDURAL; INFILTRATION; PERINEURAL
Status: COMPLETED
Start: 2022-10-13 | End: 2022-10-13

## 2022-10-13 RX ORDER — IBUPROFEN 800 MG/1
800 TABLET ORAL
Status: DISCONTINUED | OUTPATIENT
Start: 2022-10-13 | End: 2022-10-14 | Stop reason: HOSPADM

## 2022-10-13 RX ORDER — OXYTOCIN 10 [USP'U]/ML
10 INJECTION, SOLUTION INTRAMUSCULAR; INTRAVENOUS
Status: DISCONTINUED | OUTPATIENT
Start: 2022-10-13 | End: 2022-10-14 | Stop reason: HOSPADM

## 2022-10-13 RX ADMIN — ROPIVACAINE HYDROCHLORIDE 200 MG: 2 INJECTION, SOLUTION EPIDURAL; INFILTRATION at 22:38

## 2022-10-13 RX ADMIN — ALUMINUM HYDROXIDE, MAGNESIUM HYDROXIDE, AND DIMETHICONE 30 ML: 400; 400; 40 SUSPENSION ORAL at 23:23

## 2022-10-13 RX ADMIN — LIDOCAINE HYDROCHLORIDE,EPINEPHRINE BITARTRATE 3 ML: 15; .005 INJECTION, SOLUTION EPIDURAL; INFILTRATION; INTRACAUDAL; PERINEURAL at 22:32

## 2022-10-13 RX ADMIN — Medication 2 MILLI-UNITS/MIN: at 18:26

## 2022-10-13 RX ADMIN — BUPIVACAINE HYDROCHLORIDE 5 ML: 2.5 INJECTION, SOLUTION EPIDURAL; INFILTRATION; INTRACAUDAL; PERINEURAL at 22:32

## 2022-10-13 RX ADMIN — ROPIVACAINE HYDROCHLORIDE 200 MG: 2 INJECTION, SOLUTION EPIDURAL; INFILTRATION; PERINEURAL at 22:38

## 2022-10-13 RX ADMIN — FENTANYL CITRATE 100 MCG: 50 INJECTION, SOLUTION INTRAMUSCULAR; INTRAVENOUS at 22:32

## 2022-10-13 ASSESSMENT — COPD QUESTIONNAIRES
DO YOU EVER COUGH UP ANY MUCUS OR PHLEGM?: NO/ONLY WITH OCCASIONAL COLDS OR INFECTIONS
IN THE PAST 12 MONTHS DO YOU DO LESS THAN YOU USED TO BECAUSE OF YOUR BREATHING PROBLEMS: DISAGREE/UNSURE
DURING THE PAST 4 WEEKS HOW MUCH DID YOU FEEL SHORT OF BREATH: NONE/LITTLE OF THE TIME
HAVE YOU SMOKED AT LEAST 100 CIGARETTES IN YOUR ENTIRE LIFE: NO/DON'T KNOW

## 2022-10-13 ASSESSMENT — LIFESTYLE VARIABLES
CONSUMPTION TOTAL: INCOMPLETE
TOTAL SCORE: 0
EVER HAD A DRINK FIRST THING IN THE MORNING TO STEADY YOUR NERVES TO GET RID OF A HANGOVER: NO
EVER_SMOKED: YES
EVER FELT BAD OR GUILTY ABOUT YOUR DRINKING: NO
ALCOHOL_USE: NO
TOTAL SCORE: 0
TOTAL SCORE: 0
HAVE PEOPLE ANNOYED YOU BY CRITICIZING YOUR DRINKING: NO
HAVE YOU EVER FELT YOU SHOULD CUT DOWN ON YOUR DRINKING: NO

## 2022-10-13 ASSESSMENT — FIBROSIS 4 INDEX: FIB4 SCORE: 11.95

## 2022-10-13 ASSESSMENT — PATIENT HEALTH QUESTIONNAIRE - PHQ9
2. FEELING DOWN, DEPRESSED, IRRITABLE, OR HOPELESS: NOT AT ALL
SUM OF ALL RESPONSES TO PHQ9 QUESTIONS 1 AND 2: 0
1. LITTLE INTEREST OR PLEASURE IN DOING THINGS: NOT AT ALL

## 2022-10-14 LAB
ERYTHROCYTE [DISTWIDTH] IN BLOOD BY AUTOMATED COUNT: 47.2 FL (ref 35.9–50)
HCT VFR BLD AUTO: 34.7 % (ref 37–47)
HGB BLD-MCNC: 11.8 G/DL (ref 12–16)
MCH RBC QN AUTO: 32.4 PG (ref 27–33)
MCHC RBC AUTO-ENTMCNC: 34 G/DL (ref 33.6–35)
MCV RBC AUTO: 95.3 FL (ref 81.4–97.8)
PLATELET # BLD AUTO: 197 K/UL (ref 164–446)
PMV BLD AUTO: 9.6 FL (ref 9–12.9)
RBC # BLD AUTO: 3.64 M/UL (ref 4.2–5.4)
WBC # BLD AUTO: 20.3 K/UL (ref 4.8–10.8)

## 2022-10-14 PROCEDURE — 36415 COLL VENOUS BLD VENIPUNCTURE: CPT

## 2022-10-14 PROCEDURE — 304965 HCHG RECOVERY SERVICES

## 2022-10-14 PROCEDURE — 59409 OBSTETRICAL CARE: CPT

## 2022-10-14 PROCEDURE — 0UQMXZZ REPAIR VULVA, EXTERNAL APPROACH: ICD-10-PCS | Performed by: OBSTETRICS & GYNECOLOGY

## 2022-10-14 PROCEDURE — 770002 HCHG ROOM/CARE - OB PRIVATE (112)

## 2022-10-14 PROCEDURE — 700111 HCHG RX REV CODE 636 W/ 250 OVERRIDE (IP): Performed by: OBSTETRICS & GYNECOLOGY

## 2022-10-14 PROCEDURE — A9270 NON-COVERED ITEM OR SERVICE: HCPCS | Performed by: OBSTETRICS & GYNECOLOGY

## 2022-10-14 PROCEDURE — 85027 COMPLETE CBC AUTOMATED: CPT

## 2022-10-14 PROCEDURE — 700102 HCHG RX REV CODE 250 W/ 637 OVERRIDE(OP): Performed by: OBSTETRICS & GYNECOLOGY

## 2022-10-14 RX ORDER — MISOPROSTOL 200 UG/1
600 TABLET ORAL
Status: DISCONTINUED | OUTPATIENT
Start: 2022-10-14 | End: 2022-10-15 | Stop reason: HOSPADM

## 2022-10-14 RX ORDER — METHYLERGONOVINE MALEATE 0.2 MG/ML
INJECTION INTRAVENOUS
Status: ACTIVE
Start: 2022-10-14 | End: 2022-10-14

## 2022-10-14 RX ORDER — ACETAMINOPHEN 500 MG
1000 TABLET ORAL EVERY 6 HOURS PRN
Status: DISCONTINUED | OUTPATIENT
Start: 2022-10-14 | End: 2022-10-15 | Stop reason: HOSPADM

## 2022-10-14 RX ORDER — DOCUSATE SODIUM 100 MG/1
100 CAPSULE, LIQUID FILLED ORAL 2 TIMES DAILY PRN
Status: DISCONTINUED | OUTPATIENT
Start: 2022-10-14 | End: 2022-10-15 | Stop reason: HOSPADM

## 2022-10-14 RX ORDER — TRANEXAMIC ACID 100 MG/ML
INJECTION, SOLUTION INTRAVENOUS
Status: ACTIVE
Start: 2022-10-14 | End: 2022-10-14

## 2022-10-14 RX ORDER — CARBOPROST TROMETHAMINE 250 UG/ML
INJECTION, SOLUTION INTRAMUSCULAR
Status: ACTIVE
Start: 2022-10-14 | End: 2022-10-14

## 2022-10-14 RX ORDER — MISOPROSTOL 200 UG/1
TABLET ORAL
Status: ACTIVE
Start: 2022-10-14 | End: 2022-10-14

## 2022-10-14 RX ORDER — SODIUM CHLORIDE, SODIUM LACTATE, POTASSIUM CHLORIDE, AND CALCIUM CHLORIDE .6; .31; .03; .02 G/100ML; G/100ML; G/100ML; G/100ML
250 INJECTION, SOLUTION INTRAVENOUS PRN
Status: DISCONTINUED | OUTPATIENT
Start: 2022-10-14 | End: 2022-10-14 | Stop reason: HOSPADM

## 2022-10-14 RX ORDER — MISOPROSTOL 200 UG/1
TABLET ORAL
Status: COMPLETED
Start: 2022-10-14 | End: 2022-10-14

## 2022-10-14 RX ORDER — SODIUM CHLORIDE, SODIUM LACTATE, POTASSIUM CHLORIDE, CALCIUM CHLORIDE 600; 310; 30; 20 MG/100ML; MG/100ML; MG/100ML; MG/100ML
INJECTION, SOLUTION INTRAVENOUS PRN
Status: DISCONTINUED | OUTPATIENT
Start: 2022-10-14 | End: 2022-10-15 | Stop reason: HOSPADM

## 2022-10-14 RX ORDER — SODIUM CHLORIDE, SODIUM LACTATE, POTASSIUM CHLORIDE, AND CALCIUM CHLORIDE .6; .31; .03; .02 G/100ML; G/100ML; G/100ML; G/100ML
1000 INJECTION, SOLUTION INTRAVENOUS
Status: DISCONTINUED | OUTPATIENT
Start: 2022-10-14 | End: 2022-10-14 | Stop reason: HOSPADM

## 2022-10-14 RX ORDER — ROPIVACAINE HYDROCHLORIDE 2 MG/ML
INJECTION, SOLUTION EPIDURAL; INFILTRATION; PERINEURAL CONTINUOUS
Status: DISCONTINUED | OUTPATIENT
Start: 2022-10-14 | End: 2022-10-15 | Stop reason: HOSPADM

## 2022-10-14 RX ORDER — HEPARIN SODIUM 5000 [USP'U]/ML
7500 INJECTION, SOLUTION INTRAVENOUS; SUBCUTANEOUS EVERY 12 HOURS
Status: DISCONTINUED | OUTPATIENT
Start: 2022-10-14 | End: 2022-10-15 | Stop reason: HOSPADM

## 2022-10-14 RX ORDER — VITAMIN A ACETATE, BETA CAROTENE, ASCORBIC ACID, CHOLECALCIFEROL, .ALPHA.-TOCOPHEROL ACETATE, DL-, THIAMINE MONONITRATE, RIBOFLAVIN, NIACINAMIDE, PYRIDOXINE HYDROCHLORIDE, FOLIC ACID, CYANOCOBALAMIN, CALCIUM CARBONATE, FERROUS FUMARATE, ZINC OXIDE, CUPRIC OXIDE 3080; 12; 120; 400; 1; 1.84; 3; 20; 22; 920; 25; 200; 27; 10; 2 [IU]/1; UG/1; MG/1; [IU]/1; MG/1; MG/1; MG/1; MG/1; MG/1; [IU]/1; MG/1; MG/1; MG/1; MG/1; MG/1
1 TABLET, FILM COATED ORAL
Status: DISCONTINUED | OUTPATIENT
Start: 2022-10-14 | End: 2022-10-15 | Stop reason: HOSPADM

## 2022-10-14 RX ORDER — METHYLERGONOVINE MALEATE 0.2 MG/ML
0.2 INJECTION INTRAVENOUS
Status: DISCONTINUED | OUTPATIENT
Start: 2022-10-14 | End: 2022-10-15 | Stop reason: HOSPADM

## 2022-10-14 RX ORDER — IBUPROFEN 800 MG/1
800 TABLET ORAL EVERY 8 HOURS PRN
Status: DISCONTINUED | OUTPATIENT
Start: 2022-10-14 | End: 2022-10-15 | Stop reason: HOSPADM

## 2022-10-14 RX ADMIN — ACETAMINOPHEN 1000 MG: 500 TABLET ORAL at 17:27

## 2022-10-14 RX ADMIN — PRENATAL WITH FERROUS FUM AND FOLIC ACID 1 TABLET: 3080; 920; 120; 400; 22; 1.84; 3; 20; 10; 1; 12; 200; 27; 25; 2 TABLET ORAL at 10:38

## 2022-10-14 RX ADMIN — Medication 10 UNITS: at 06:20

## 2022-10-14 RX ADMIN — Medication 125 ML/HR: at 07:41

## 2022-10-14 RX ADMIN — IBUPROFEN 800 MG: 800 TABLET, FILM COATED ORAL at 20:07

## 2022-10-14 RX ADMIN — HEPARIN SODIUM 7500 UNITS: 5000 INJECTION, SOLUTION INTRAVENOUS; SUBCUTANEOUS at 20:07

## 2022-10-14 RX ADMIN — IBUPROFEN 800 MG: 800 TABLET, FILM COATED ORAL at 10:38

## 2022-10-14 ASSESSMENT — PAIN DESCRIPTION - PAIN TYPE
TYPE: ACUTE PAIN

## 2022-10-14 NOTE — H&P
DATE OF ADMISSION:  10/13/2022     HISTORY OF PRESENT ILLNESS:  The patient is a 32-year-old female  2,   para 1, who presents at 38 and 2/7th weeks' gestation, EDC of 10/25/2022 for   scheduled induction of labor secondary to small for gestational age baby as   well as being on heparin injections twice daily secondary to her personal   history of a DVT.  Her cervix is favorable.  She has been monitored by [facility] as well as my office and been having twice weekly fetal   nonstress tests, which have been reassuring.  Overall, her prenatal course has   been otherwise fairly uneventful.  She had NIPT testing early pregnancy,   low-risk female.  She had a normal fetal survey and growth scans have been   done showing small for gestational age, but does not meet criteria for growth   restriction.  She has a past history of tobacco use as well as alcohol use,   but has not since she found out she was pregnant.  Her group B strep is   negative.  Her 1-hour Glucola was 105.  Her cervix is about 2 cm.  We will   plan for Pitocin augmentation, amniotomy and will anticipate spontaneous   vaginal delivery.     Her last dose of heparin was last night at 6:00 p.m.  She was scheduled to be   induced earlier this morning, but due to the busyness of labor and delivery,   she was delayed until just now.     PAST MEDICAL HISTORY:  Significant for DVT in 2017.     ALLERGIES:  No known drug allergies.     CURRENT MEDICATIONS:  Include heparin 7500 units b.i.d.  Again, last dose was   last evening around 6:00 p.m. and prenatal vitamins.     SOCIAL HISTORY:  History of tobacco use and alcohol use in her past.  No   current alcohol, tobacco or drug use.  She is .  Her spouse's name is   Meliton.     FAMILY HISTORY:  Maternal grandmother had breast cancer, otherwise   noncontributory.     PAST SURGICAL HISTORY:  None.     OBSTETRIC HISTORY:  .  She had one spontaneous vaginal delivery, 7 pounds   7  ounces.     GYNECOLOGIC HISTORY:  She had a normal Pap smear, HPV high risk negative.    Gonorrhea, chlamydia, and trichomonas were negative and no history of herpes   simplex virus.     PHYSICAL EXAMINATION:  VITAL SIGNS:  Blood pressure on arrival 131/85, pulse 96, and weight 145   pounds.  Afebrile, 96.6 degrees Fahrenheit.  GENERAL:  Awake, alert, and oriented, in no acute distress.  CARDIOVASCULAR:  Regular rate and rhythm.  CHEST:  Clear to auscultation bilaterally.  ABDOMEN:  Gravid, nontender, and nondistended. Normal bowel sounds.  EXTREMITIES:  No cyanosis, clubbing or edema.  No calf pain bilaterally.  PELVIC:  Sterile vaginal exam per nurse was that she was 2-3 cm dilated, 70%   effaced and -2 station.  Fetal heart rate tracing category 1, reactive,   moderate variability, no decelerations.  She is kenya on her own every 5   minutes.     LABORATORY DATA:  She is O positive.  Rubella is immune, RPR nonreactive,   hepatitis B surface antigen negative.  HIV nonreactive.  Group B strep   negative, 1-hour Glucola was 105.  Current CBC is still pending.     ASSESSMENT AND PLAN:  1.  A 32-year-old female,  at 38 and 2/7th weeks' gestation, EDC of   10/25/2022, presents for scheduled induction of labor.  She is being induced   earlier than 39 weeks secondary to recommendation of [facility] secondary to small for gestational age baby as well as the fact that   she is on b.i.d. heparin.  She will remain on heparin postpartum for up to 6   weeks.  She has not taken her heparin in now 24 hours.  We will plan for   Pitocin augmentation followed by amniotomy and we will anticipate spontaneous   vaginal delivery.  2.  Fetal tracing is category 1, reassuring.  3.  Small for gestational age baby.  Personal history of alcohol and tobacco   use, but none after she found out she was pregnant.  Last growth ultrasound   was done on 10/04/2022, which revealed estimated fetal weight 6 pounds 2    ounces, 27th percentile, the head is measuring small, 5th percentile for the   biparietal diameter and head circumference 13th percentile; however, the head   is very low.  However, none of the measurements are consistent with growth   restriction.        ______________________________  MD SHAHRZAD HE/GABBI/NIT    DD:  10/13/2022 18:33  DT:  10/13/2022 21:26    Job#:  [id number]

## 2022-10-14 NOTE — PROGRESS NOTES
1900- report received from Rachael LUA, pt resting in bed with family at bedside, POC discussed questions answered; pt confirms understanding   2309- Lewis VALLE to bedside to perform SVE, AROM and discuss POC   0320- Lewis VALLE updated on pt condition  0603- Lewis VALLE called to bedside for delivery  0700- report given to Honey LUA

## 2022-10-14 NOTE — ANESTHESIA TIME REPORT
Anesthesia Start and Stop Event Times     Date Time Event    10/13/2022 2213 Ready for Procedure     2222 Anesthesia Start    10/14/2022 0613 Anesthesia Stop        Responsible Staff  10/13/22 to 10/14/22    Name Role Begin End    Mk Peterson M.D. Anesth 2222 0613        Overtime Reason:  no overtime (within assigned shift)    Comments:

## 2022-10-14 NOTE — PROGRESS NOTES
Patient brought to postpartum room 324 via wheelchair with infant in arms accompanied by her s.o.  Report received from Honey LUA.  Fundus firm, lochia light. Patient and s.o. oriented to postpartum room and plan of care.  All questions concerns addressed.

## 2022-10-14 NOTE — ANESTHESIA PROCEDURE NOTES
Epidural Block    Date/Time: 10/13/2022 10:32 PM  Performed by: Mk Peterson M.D.  Authorized by: Mk Peterson M.D.     Patient Location:  OB  Start Time:  10/13/2022 10:32 PM  End Time:  10/13/2022 10:32 PM  Reason for Block: labor analgesia    patient identified, IV checked, site marked, risks and benefits discussed, surgical consent, monitors and equipment checked, pre-op evaluation and timeout performed    Patient Position:  Sitting  Prep: ChloraPrep, patient draped and sterile technique    Monitoring:  Blood pressure, continuous pulse oximetry and heart rate  Approach:  Midline  Location:  L3-L4  Injection Technique:  NATALIE saline  Skin infiltration:  Lidocaine  Strength:  1%  Dose:  3ml  Needle Type:  Tuohy  Needle Gauge:  17 G  Needle Length:  3.5 in  Loss of resistance::  5  Catheter Size:  19 G  Catheter at Skin Depth:  10  Test Dose Result:  Negative

## 2022-10-14 NOTE — ANESTHESIA POSTPROCEDURE EVALUATION
Patient: Nichol Chaney    Procedure Summary     Date: 10/13/22 Room / Location:     Anesthesia Start: 2222 Anesthesia Stop: 10/14/22 0613    Procedure: Labor Epidural Diagnosis:     Scheduled Providers:  Responsible Provider: Mk Peterson M.D.    Anesthesia Type: epidural ASA Status: 3          Final Anesthesia Type: epidural  Last vitals  BP   Blood Pressure: (!) 93/55    Temp   36.6 °C (97.9 °F)    Pulse 80     Resp   18    SpO2   92 %      Anesthesia Post Evaluation    Patient location during evaluation: PACU  Patient participation: complete - patient participated  Level of consciousness: awake and alert    Airway patency: patent  Anesthetic complications: no  Cardiovascular status: hemodynamically stable  Respiratory status: acceptable  Hydration status: euvolemic    PONV: none    patient able to participate, but full recovery from regional anesthesia has not occurred and is not expected within the stipulated timeframe for the completion of the evaluation      No notable events documented.     Nurse Pain Score: 3 (NPRS)

## 2022-10-14 NOTE — CARE PLAN
Problem: Knowledge Deficit - L&D  Goal: Patient and family/caregivers will demonstrate understanding of plan of care, disease process/condition, diagnostic tests and medications  Outcome: Progressing     Problem: Risk for Excess Fluid Volume  Goal: Patient will demonstrate pulse, blood pressure and neurologic signs within expected ranges and without any respiratory complications  Outcome: Progressing     Problem: Psychosocial - L&D  Goal: Patient's level of anxiety will decrease  Outcome: Progressing  Goal: Patient will be able to discuss coping skills during hospitalization  Outcome: Progressing  Goal: Patient's ability to re-evaluate and adapt role responsibilities will improve  Outcome: Progressing  Goal: Spiritual and cultural needs incorporated into hospitalization  Outcome: Progressing     Problem: Risk for Venous Thromboembolism (VTE)  Goal: VTE prevention measures will be implemented and patient will remain free from VTE  Outcome: Progressing     Problem: Risk for Infection and Impaired Wound Healing  Goal: Patient will remain free from infection  Outcome: Progressing  Goal: Patient's wound/surgical incision will decrease in size and heals properly  Outcome: Progressing     Problem: Risk for Fluid Imbalance  Goal: Patient's fluid volume balance will be maintained or improve  Outcome: Progressing     Problem: Risk for Injury  Goal: Patient and fetus will be free of preventable injury/complications  Outcome: Progressing     Problem: Pain  Goal: Patient's pain will be alleviated or reduced to the patient’s comfort goal  Outcome: Progressing     Problem: Discharge Barriers/Planning  Goal: Patient's continuum of care needs are met  Outcome: Progressing   The patient is Stable - Low risk of patient condition declining or worsening         Progress made toward(s) clinical / shift goals:  Progressing

## 2022-10-14 NOTE — L&D DELIVERY NOTE
DATE OF SERVICE:  10/14/2022     PREOPERATIVE DIAGNOSES:  1.  Intrauterine pregnancy at 38+ weeks' gestation.  2.  Maternal history of deep venous thrombosis, currently anticoagulated on   heparin.     POSTOPERATIVE DIAGNOSES:  1.  Intrauterine pregnancy at 38+ weeks' gestation.  2.  Maternal history of deep venous thrombosis, currently anticoagulated on   heparin.     PROCEDURE:  Normal spontaneous vaginal delivery.     DELIVERING PHYSICIAN:  Laura Saucedo MD     ANESTHESIA:  Epidural.     ANESTHESIOLOGIST:  Dr. Mk Peterson.     COMPLICATIONS:  None.     ESTIMATED BLOOD LOSS:  150 mL.     FINDINGS:  Female infant, cephalic presentation, clear amniotic fluid, Apgars   8 and 9, weight still pending at the time of this dictation.  Intact placenta,   3-vessel cord.  Short cord.  Small excoriation on the right labia repaired   with 1 interrupted suture of 3-0 Vicryl.  Sponge, laps and needle counts were   correct x3.  Mother and baby both doing well.  Mother will go to postpartum,   baby to  nursery.     HOSPITAL COURSE:  The patient has a personal history of a deep venous   thrombosis, has been on Lovenox throughout the pregnancy until 36 weeks and   then was transitioned to heparin.  She was monitored closely as she was small   for gestational age, but never diagnosed with growth restriction, although she   did have growth restriction with her last pregnancy.  She was admitted and   was 2 cm on arrival.  She received Pitocin and received epidural anesthesia.    She had amniotomy and then was internalized and increased Pitocin to adequate   once was admitted to unit.  She went on to become complete.  She pushed and   delivered a female infant over an intact perineum, but small labial   excoriation.  Once the anterior shoulder delivered, the rest of baby delivered   uneventfully.  Nose and mouth were bulb suctioned.  Baby was placed on   maternal abdomen with awaiting delivery team nurse.  Short cord was noted.     Delay for cord clamp x1 minute.  Cord was then clamped x2 and cut by father of   the baby.  The placenta delivered spontaneously intact, 3-vessel cord.  She   underwent an excoriation on the right superior labial side that was repaired   with 1 interrupted suture of 3-0 Vicryl.  Sponge, laps and needle counts were   correct x 3.  Fundus was firm and bleeding scant.  Mother will go to   postpartum, baby to  nursery.  She will resume heparin 12 hours   postpartum and continue that for 6 weeks.        ______________________________  MD SHAHRZAD HE/DESHAWN    DD:  10/14/2022 06:51  DT:  10/14/2022 08:37    Job#:  [id number]

## 2022-10-14 NOTE — LACTATION NOTE
This note was copied from a baby's chart.  32yr, , 38wk3d    Mom reports that this baby latched and  well immediately after birth but now has been sleepy once arriving to postpartum unit.  Reassured mom that this is normal  behavior in the first 24 hours of life.    Explained the importance of a good latch to avoid nipple pain and damage   Instructed to call RN or LC with next breastfeeding attempt.  Recommended not allowing baby to sleep longer than 3-4 hours without trying to wake and breastfeed.  Recommended skin to skin with mom and baby as much as possible while mom is awake to become familiar with and learn baby's feeding cues.  Educated mom that baby's tend to breastfeed more frequently and longer when skin to skin with mom.    Offered WIC referral and WIC referral FAX'd to [facility]

## 2022-10-14 NOTE — PROGRESS NOTES
173- Pt , 38 and 2, here for a scheduled induction. Pt denies any LOF, VB or contractions. Pt reports fetal movement WNL. SVE per charted in flowsheet.      - Dr. Saucedo updated on pt status. Pitocin 2x2 ordered.    - Report given to STONEY Magdaleno. POC discussed. Pt stable at this time.

## 2022-10-14 NOTE — PROGRESS NOTES
See dictated note      Female  Vtx  Clear fluid  Apgars 8/9  Weight pending  Intact placenta, 3vc  Small labial excoriation repaired with a single inter suture of 3.0 vicryl  Ebl 150cc   No complications.

## 2022-10-14 NOTE — CARE PLAN
The patient is Watcher - Medium risk of patient condition declining or worsening         Progress made toward(s) clinical / shift goals:    Problem: Knowledge Deficit - L&D  Goal: Patient and family/caregivers will demonstrate understanding of plan of care, disease process/condition, diagnostic tests and medications  Outcome: Progressing     Problem: Psychosocial - L&D  Goal: Patient will be able to discuss coping skills during hospitalization  Outcome: Progressing     Problem: Risk for Venous Thromboembolism (VTE)  Goal: VTE prevention measures will be implemented and patient will remain free from VTE  Outcome: Progressing     Problem: Risk for Injury  Goal: Patient and fetus will be free of preventable injury/complications  Outcome: Progressing     Problem: Pain  Goal: Patient's pain will be alleviated or reduced to the patient’s comfort goal  Outcome: Progressing       Patient is not progressing towards the following goals:

## 2022-10-15 ENCOUNTER — PHARMACY VISIT (OUTPATIENT)
Dept: PHARMACY | Facility: MEDICAL CENTER | Age: 32
End: 2022-10-15
Payer: COMMERCIAL

## 2022-10-15 VITALS
RESPIRATION RATE: 18 BRPM | BODY MASS INDEX: 22.76 KG/M2 | DIASTOLIC BLOOD PRESSURE: 68 MMHG | HEIGHT: 67 IN | OXYGEN SATURATION: 97 % | SYSTOLIC BLOOD PRESSURE: 106 MMHG | WEIGHT: 145 LBS | TEMPERATURE: 97.8 F | HEART RATE: 69 BPM

## 2022-10-15 PROCEDURE — 700102 HCHG RX REV CODE 250 W/ 637 OVERRIDE(OP): Performed by: OBSTETRICS & GYNECOLOGY

## 2022-10-15 PROCEDURE — 700111 HCHG RX REV CODE 636 W/ 250 OVERRIDE (IP): Performed by: OBSTETRICS & GYNECOLOGY

## 2022-10-15 PROCEDURE — A9270 NON-COVERED ITEM OR SERVICE: HCPCS | Performed by: OBSTETRICS & GYNECOLOGY

## 2022-10-15 PROCEDURE — RXMED WILLOW AMBULATORY MEDICATION CHARGE: Performed by: OBSTETRICS & GYNECOLOGY

## 2022-10-15 RX ORDER — IBUPROFEN 800 MG/1
800 TABLET ORAL EVERY 8 HOURS PRN
Qty: 30 TABLET | Refills: 1 | Status: SHIPPED | OUTPATIENT
Start: 2022-10-15

## 2022-10-15 RX ADMIN — IBUPROFEN 800 MG: 800 TABLET, FILM COATED ORAL at 07:22

## 2022-10-15 RX ADMIN — DOCUSATE SODIUM 100 MG: 100 CAPSULE, LIQUID FILLED ORAL at 07:22

## 2022-10-15 RX ADMIN — HEPARIN SODIUM 7500 UNITS: 5000 INJECTION, SOLUTION INTRAVENOUS; SUBCUTANEOUS at 07:22

## 2022-10-15 RX ADMIN — PRENATAL WITH FERROUS FUM AND FOLIC ACID 1 TABLET: 3080; 920; 120; 400; 22; 1.84; 3; 20; 10; 1; 12; 200; 27; 25; 2 TABLET ORAL at 07:22

## 2022-10-15 ASSESSMENT — EDINBURGH POSTNATAL DEPRESSION SCALE (EPDS)
I HAVE BEEN SO UNHAPPY THAT I HAVE BEEN CRYING: NO, NEVER
I HAVE FELT SAD OR MISERABLE: NO, NOT AT ALL
I HAVE BEEN ANXIOUS OR WORRIED FOR NO GOOD REASON: NO, NOT AT ALL
I HAVE BEEN ABLE TO LAUGH AND SEE THE FUNNY SIDE OF THINGS: AS MUCH AS I ALWAYS COULD
THINGS HAVE BEEN GETTING ON TOP OF ME: NO, I HAVE BEEN COPING AS WELL AS EVER
I HAVE BEEN SO UNHAPPY THAT I HAVE HAD DIFFICULTY SLEEPING: NOT AT ALL
THE THOUGHT OF HARMING MYSELF HAS OCCURRED TO ME: NEVER
I HAVE FELT SCARED OR PANICKY FOR NO GOOD REASON: NO, NOT AT ALL
I HAVE BLAMED MYSELF UNNECESSARILY WHEN THINGS WENT WRONG: NOT VERY OFTEN
I HAVE LOOKED FORWARD WITH ENJOYMENT TO THINGS: AS MUCH AS I EVER DID

## 2022-10-15 ASSESSMENT — PAIN DESCRIPTION - PAIN TYPE
TYPE: ACUTE PAIN
TYPE: ACUTE PAIN

## 2022-10-15 NOTE — DISCHARGE INSTRUCTIONS
PATIENT DISCHARGE EDUCATION INSTRUCTION SHEET    REASONS TO CALL YOUR OBSTETRICIAN  Persistent fever, shaking, chills (Temperature higher than 100.4) may indicate you have an infection  Heavy bleeding: soaking more than 1 pad per hour; Passing clots an egg-sized clot or bigger may mean you have an postpartum hemorrhage  Foul odor from vagina or bad smelling or discolored discharge or blood  Breast infection (Mastitis symptoms); breast pain, chills, fever, redness or red streaks, may feel flu like symptoms  Urinary pain, burning or frequency  Incision that is not healing, increased redness, swelling, tenderness or pain, or any pus from episiotomy or  site may mean you have an infection  Redness, swelling, warmth, or painful to touch in the calf area of your leg may mean you have a blood clot  Severe or intensified depression, thoughts or feelings of wanting to hurt yourself or someone else   Pain in chest, obstructed breathing or shortness of breath (trouble catching your breath) may mean you are having a postpartum complication. Call your provider immediately   Headache that does not get better, even after taking medicine, a bad headache with vision changes or pain in the upper right area of your belly may mean you have high blood pressure or post birth preeclampsia. Call your provider immediately    HAND WASHING  All family and friends should wash their hands:  Before and after holding the baby  Before feeding the baby  After using the restroom or changing the baby's diaper    WOUND CARE  Ask your physician for additional care instructions. In general:  Episiotomy/Laceration  May use rojelio-spray bottle, witch hazel pads and dermaplast spray for comfort  Use rojelio-spray bottle after urinating to cleanse perineal area  To prevent burning during urination spray rojelio-water bottle on labial area   Pat perineal area dry until episiotomy/laceration is healed  Continue to use rojelio-bottle until bleeding stops as  needed  If have a 2nd degree laceration or greater, a Sitz bath can offer relief from soreness, burning, and inflammation   Sitz Bath   Sit in 6 inches of warm water and soak laceration as needed until the laceration heals    VAGINAL CARE AND BLEEDING  Nothing inside vagina for 6 weeks:   No sexual intercourse, tampons or douching  Bleeding may continue for 2-4 weeks. Amount and color may vary  Soaking 1 pad or more in an hour for several hours is considered heavy bleeding  Passing large egg sized blood clots can be concerning  If you feel like you have heavy bleeding or are having increasing amount of blood clots call your Obstetrician immediately  If you begin feeling faint upon standing, feeling sick to your stomach, have clammy skin, a really fast heartbeat, have chills, start feeling confused, dizzy, sleepy or weak, or feeling like you're going to faint call your Obstetrician immediately    HYPERTENSION   Preeclampsia or gestational hypertension are types of high blood pressure that only pregnant women can get. It is important for you to be aware of symptoms to seek early intervention and treatment. If you have any of these symptoms immediately call your Obstetrician    Vision changes or blurred vision   Severe headache or pain that is unrelieved with medication and will not go away  Persistent pain in upper abdomen or shoulder   Increased swelling of face, feet, or hands  Difficulty breathing or shortness of breath at rest  Urinating less than usual    URINATION AND BOWEL MOVEMENTS  Eating more fiber (bran cereal, fruits, and vegetables) and drinking plenty of fluids will help to avoid constipation  Urinary frequency and urgency after childbirth is normal  If you experience any urinary pain, burning or frequency call your provider    BIRTH CONTROL  It is possible to become pregnant at any time after delivery and while breastfeeding  Plan to discuss a method of birth control with your physician at your post  "delivery follow up visit    POSTPARTUM BLUES  During the first few days after birth, you may experience a sense of the \"blues\" which may include impatience, irritability or even crying. These feelings come and go quickly. However, as many as 1 in 10 women experience emotional symptoms known as postpartum depression.     POSTPARTUM DEPRESSION    May start as early as the second or third day after delivery or take several weeks or months to develop. Symptoms of \"blues\" are present, but are more intense: Crying spells; loss of appetite; feelings of hopelessness or loss of control; fear of touching the baby; over concern or no concern at all about the baby; little or no concern about your own appearance/caring for yourself; and/or inability to sleep or excessive sleeping. Contact your Obstetrician if you are experiencing any of these symptoms     PREVENTING SHAKEN BABY  If you are angry or stressed, PUT THE BABY IN THE CRIB, step away, take some deep breaths, and wait until you are calm to care for the baby. DO NOT SHAKE THE BABY. You are not alone, call a supporter for help.  [facility] 24/7 crisis call line ([phone]) or (1-168.210.2847)  You can also text them, text \"ANSWER\" (754341)  "

## 2022-10-15 NOTE — CARE PLAN
The patient is Stable - Low risk of patient condition declining or worsening    Shift Goals  Clinical Goals: Pain control, firm fundus, light lochia    Progress made toward(s) clinical / shift goals:    Problem: Pain - Standard  Goal: Alleviation of pain or a reduction in pain to the patient’s comfort goal  Outcome: Progressing     Problem: Knowledge Deficit - Postpartum  Goal: Patient will verbalize and demonstrate understanding of self and infant care  Outcome: Progressing     Problem: Altered Physiologic Condition  Goal: Patient physiologically stable as evidenced by normal lochia, palpable uterine involution and vitals within normal limits  Outcome: Progressing  Note: Fundus firm, lochia light. VSS. Pt performing self care.

## 2022-10-15 NOTE — DISCHARGE SUMMARY
DATE OF ADMISSION:  10/13/2022     ADMITTING DIAGNOSES:   1.  Intrauterine pregnancy at 38 and 2/7th weeks for an induction of labor   secondary to #2.  2.  Intrauterine growth restriction of the baby.  3.  History of DVT, on heparin.     DISCHARGE DIAGNOSES:   1.  Intrauterine pregnancy at 38 and 2/7th weeks for an induction of labor   secondary to #2.  2.  Intrauterine growth restriction of the baby.  3.  History of DVT, on heparin.  4.  Status post normal spontaneous vaginal delivery.     HOSPITAL COURSE:  The patient was admitted and underwent vaginal delivery   yesterday morning.  She is postpartum day #1 and desires to be discharged   home.  Her postpartum hemoglobin is stable at 11.8.  She has her heparin   injections already which she started 12 hours post-delivery and already has   the medication at home and does not need a prescription.  I did write a   prescription for Motrin.  She has been instructed to follow up with Dr. Saucedo   in 6 weeks' time.        ______________________________  Corinne E. Capurro, MD [facility]/KRISTIN    DD:  10/15/2022 06:20  DT:  10/15/2022 06:26    Job#:  [id number]

## 2022-10-15 NOTE — CARE PLAN
The patient is Stable - Low risk of patient condition declining or worsening    Shift Goals  Clinical Goals: VSS, pain control    Progress made toward(s) clinical / shift goals:    Plan of care reviewed with pt, all questions and concerns addressed. Pain managed with medications per MAR.  Call light within reach, bed in lowest and locked position.    Problem: Pain - Standard  Goal: Alleviation of pain or a reduction in pain to the patient’s comfort goal  Outcome: Progressing     Problem: Knowledge Deficit - Postpartum  Goal: Patient will verbalize and demonstrate understanding of self and infant care  Outcome: Progressing     Problem: Psychosocial - Postpartum  Goal: Patient will verbalize and demonstrate effective bonding and parenting behavior  Outcome: Progressing     Problem: Altered Physiologic Condition  Goal: Patient physiologically stable as evidenced by normal lochia, palpable uterine involution and vitals within normal limits  Outcome: Progressing     Problem: Infection - Postpartum  Goal: Postpartum patient will be free of signs and symptoms of infection  Outcome: Progressing

## 2022-10-15 NOTE — PROGRESS NOTES
Reviewed discharge education and instructions on follow up. Patient verbalized understanding. Discharge paperwork signed.  All questions/concerns addressed. Patient released ambulatory from the hospital in no apparent distress.

## 2022-10-15 NOTE — PROGRESS NOTES
Assessment completed. Fundus firm, lochia light. Heparin given.  Discussed plan of care for discharge.  All questions/concerns addressed.

## 2022-10-15 NOTE — PROGRESS NOTES
Received report from L&D that patient will restart heparin at 0600. Clarified with Dr. Taylor that patient should receive heparin now.  Night shift RN Ute updated.

## 2022-10-27 NOTE — ED PROVIDER NOTES
LABOR AND DELIVERY TRIAGE NOTE    PATIENT ID:  NAME:  Nichol Chaney  MRN:               [id number]  YOB: 1990     32 y.o. female  at 38w3d.    Subjective: Pt presented to [facility] Triage with complaints of loss of fluid at home. Patient states she is feeling increased discharge or clear fluid in your undergarments. Patient states her pregnancy has otherwise been uncomplicated and has no other concerns today. Patient denies n/v, vaginal bleeding, associated headaches, or vision loss.FM/TOCO during my assessment.    negative for contractions  negative pain   negative for LOF  negative for vaginal bleeding  positive for fetal movement    PNL: unknown, unable to obtain records in chart at this time.    ROS: Patient denies any fever chills, nausea, vomiting, headache, chest pain, shortness of breath, or dysuria or unusual swelling of hands or feet.     PMHx:   Past Medical History:   Diagnosis Date    History of DVT (deep vein thrombosis)     Tobacco use         OB History    Para Term  AB Living   3 2 1 1 0 2   SAB IAB Ectopic Molar Multiple Live Births           0 1      # Outcome Date GA Lbr Scott/2nd Weight Sex Delivery Anes PTL Lv   3 Term 10/14/22 38w3d / 01:13 2.91 kg (6 lb 6.7 oz) F Vag-Spont EPI N FLAQUITO   2             1                Birth Comments: System Generated. Please review and update pregnancy details.       GYN: denies STIs, no cervical procedures.    PSHx:  No past surgical history on file.    Social Hx:  Social History     Tobacco Use    Smoking status: Former     Packs/day: 0.25     Years: 10.00     Pack years: 2.50     Types: Cigarettes     Start date: 10/15/2018     Quit date: 2022     Years since quittin.2     Passive exposure: Past    Smokeless tobacco: Never   Vaping Use    Vaping Use: Never used   Substance Use Topics    Alcohol use: Not Currently     Alcohol/week: 3.6 oz     Types: 6 Cans of beer per week    Drug use: No     Medications:  No  "current facility-administered medications on file prior to encounter.     No current outpatient medications on file prior to encounter.     Allergies:  no known allergies.    Objective:    Vitals:    22 1025   BP: 112/66   Pulse: 91   Resp: 15   Temp: 36.4 °C (97.5 °F)   TempSrc: Temporal   Weight: 65.8 kg (145 lb)   Height: 1.702 m (5' 7\")     No data recorded.    General: No acute distress, resting comfortably in bed.  HEENT: normocephalic, nontraumatic, PERRLA, EOMI  Cardiovascular: Heart RRR with no murmurs, rubs or gallops. Distal Pulses 2+  Respiratory: symmetric chest expansion, lungs CTAB, with no wheezes, rales, rhonci  Abdomen: gravid, nontender  Musculoskeletal: FERRIS spontaneously  Neuro: non focal with no numbness, tingling or changes in sensation    Cervix:  Deferred  [place]: No contractions  FHRM: moderate variability, + accels, no decels    Labs:   Results for orders placed or performed during the hospital encounter of 22   AMNISURE ROM ASSAY   Result Value Ref Range    AmniSure ROM Negative Negative     Assessment: 32 y.o. female  at 38w3d presents with complaints of loss of fluid.    Plan:   - Observed baby on monitor in ED; continued FHM tracing  - Amnisure performed- negative  - Patient is cleared to return home with family. Encouraged to see MD/DO for increased painful uterine contractions @ 3-5, vaginal bleeding, loss of fluid, or other serious symptoms.    Discussed case with Dr. De La Paz, [facility] Attending. Case was discussed and attending agreed with plan prior to discharge of patient.    Cat Gallagher M.D.   Family Medicine Resident  [facility]  "

## 2022-12-07 NOTE — TELEPHONE ENCOUNTER
"Patient called in stating she has had some numbness, swelling, pain, an bruising to the left leg. Patient states it is hot to the touch. Patient states these symptoms \"come and go\". Patient also states she stopped ASA. Spoke with Maricarmen MEDINA who urges the patient to be seen in ER right away. Patient states understanding and will head to ER now.       Danna Posadas, Med Ass't  [facility]   "
Standing/Walking

## 2022-12-16 ENCOUNTER — PRE-ADMISSION TESTING (OUTPATIENT)
Dept: ADMISSIONS | Facility: MEDICAL CENTER | Age: 32
End: 2022-12-16
Attending: OBSTETRICS & GYNECOLOGY
Payer: MEDICAID

## 2022-12-16 RX ORDER — BUPROPION HYDROCHLORIDE 150 MG/1
150 TABLET, EXTENDED RELEASE ORAL DAILY
Status: ON HOLD | COMMUNITY
Start: 2022-10-05 | End: 2022-12-20

## 2022-12-19 ENCOUNTER — ANESTHESIA EVENT (OUTPATIENT)
Dept: SURGERY | Facility: MEDICAL CENTER | Age: 32
End: 2022-12-19
Payer: MEDICAID

## 2022-12-19 ENCOUNTER — PRE-ADMISSION TESTING (OUTPATIENT)
Dept: ADMISSIONS | Facility: MEDICAL CENTER | Age: 32
End: 2022-12-19
Attending: OBSTETRICS & GYNECOLOGY
Payer: MEDICAID

## 2022-12-19 DIAGNOSIS — Z01.812 PRE-OPERATIVE LABORATORY EXAMINATION: ICD-10-CM

## 2022-12-19 LAB
ABO GROUP BLD: NORMAL
ALBUMIN SERPL BCP-MCNC: 4.8 G/DL (ref 3.2–4.9)
ALBUMIN/GLOB SERPL: 1.7 G/DL
ALP SERPL-CCNC: 127 U/L (ref 30–99)
ALT SERPL-CCNC: 17 U/L (ref 2–50)
ANION GAP SERPL CALC-SCNC: 13 MMOL/L (ref 7–16)
APPEARANCE UR: CLEAR
AST SERPL-CCNC: 12 U/L (ref 12–45)
B-HCG SERPL-ACNC: <1 MIU/ML (ref 0–5)
BACTERIA #/AREA URNS HPF: NEGATIVE /HPF
BASOPHILS # BLD AUTO: 0.6 % (ref 0–1.8)
BASOPHILS # BLD: 0.03 K/UL (ref 0–0.12)
BILIRUB SERPL-MCNC: 0.3 MG/DL (ref 0.1–1.5)
BILIRUB UR QL STRIP.AUTO: NEGATIVE
BLD GP AB SCN SERPL QL: NORMAL
BUN SERPL-MCNC: 11 MG/DL (ref 8–22)
CALCIUM ALBUM COR SERPL-MCNC: 8.9 MG/DL (ref 8.5–10.5)
CALCIUM SERPL-MCNC: 9.5 MG/DL (ref 8.5–10.5)
CHLORIDE SERPL-SCNC: 102 MMOL/L (ref 96–112)
CO2 SERPL-SCNC: 25 MMOL/L (ref 20–33)
COLOR UR: YELLOW
CREAT SERPL-MCNC: 0.82 MG/DL (ref 0.5–1.4)
EOSINOPHIL # BLD AUTO: 0.08 K/UL (ref 0–0.51)
EOSINOPHIL NFR BLD: 1.5 % (ref 0–6.9)
EPI CELLS #/AREA URNS HPF: ABNORMAL /HPF
ERYTHROCYTE [DISTWIDTH] IN BLOOD BY AUTOMATED COUNT: 42.3 FL (ref 35.9–50)
GFR SERPLBLD CREATININE-BSD FMLA CKD-EPI: 98 ML/MIN/1.73 M 2
GLOBULIN SER CALC-MCNC: 2.9 G/DL (ref 1.9–3.5)
GLUCOSE SERPL-MCNC: 88 MG/DL (ref 65–99)
GLUCOSE UR STRIP.AUTO-MCNC: NEGATIVE MG/DL
HCT VFR BLD AUTO: 41.1 % (ref 37–47)
HGB BLD-MCNC: 13.7 G/DL (ref 12–16)
HYALINE CASTS #/AREA URNS LPF: ABNORMAL /LPF
IMM GRANULOCYTES # BLD AUTO: 0.02 K/UL (ref 0–0.11)
IMM GRANULOCYTES NFR BLD AUTO: 0.4 % (ref 0–0.9)
KETONES UR STRIP.AUTO-MCNC: NEGATIVE MG/DL
LEUKOCYTE ESTERASE UR QL STRIP.AUTO: NEGATIVE
LYMPHOCYTES # BLD AUTO: 2.68 K/UL (ref 1–4.8)
LYMPHOCYTES NFR BLD: 49.4 % (ref 22–41)
MCH RBC QN AUTO: 31.1 PG (ref 27–33)
MCHC RBC AUTO-ENTMCNC: 33.3 G/DL (ref 33.6–35)
MCV RBC AUTO: 93.4 FL (ref 81.4–97.8)
MICRO URNS: ABNORMAL
MONOCYTES # BLD AUTO: 0.71 K/UL (ref 0–0.85)
MONOCYTES NFR BLD AUTO: 13.1 % (ref 0–13.4)
NEUTROPHILS # BLD AUTO: 1.91 K/UL (ref 2–7.15)
NEUTROPHILS NFR BLD: 35 % (ref 44–72)
NITRITE UR QL STRIP.AUTO: NEGATIVE
NRBC # BLD AUTO: 0 K/UL
NRBC BLD-RTO: 0 /100 WBC
PH UR STRIP.AUTO: 7.5 [PH] (ref 5–8)
PLATELET # BLD AUTO: 273 K/UL (ref 164–446)
PMV BLD AUTO: 9.2 FL (ref 9–12.9)
POTASSIUM SERPL-SCNC: 4 MMOL/L (ref 3.6–5.5)
PROT SERPL-MCNC: 7.7 G/DL (ref 6–8.2)
PROT UR QL STRIP: NEGATIVE MG/DL
RBC # BLD AUTO: 4.4 M/UL (ref 4.2–5.4)
RBC # URNS HPF: ABNORMAL /HPF
RBC UR QL AUTO: ABNORMAL
RH BLD: NORMAL
SODIUM SERPL-SCNC: 140 MMOL/L (ref 135–145)
SP GR UR STRIP.AUTO: 1.02
UROBILINOGEN UR STRIP.AUTO-MCNC: 0.2 MG/DL
WBC # BLD AUTO: 5.4 K/UL (ref 4.8–10.8)
WBC #/AREA URNS HPF: ABNORMAL /HPF

## 2022-12-19 PROCEDURE — 85025 COMPLETE CBC W/AUTO DIFF WBC: CPT

## 2022-12-19 PROCEDURE — 36415 COLL VENOUS BLD VENIPUNCTURE: CPT

## 2022-12-19 PROCEDURE — 86901 BLOOD TYPING SEROLOGIC RH(D): CPT

## 2022-12-19 PROCEDURE — 86850 RBC ANTIBODY SCREEN: CPT

## 2022-12-19 PROCEDURE — 81001 URINALYSIS AUTO W/SCOPE: CPT

## 2022-12-19 PROCEDURE — 84702 CHORIONIC GONADOTROPIN TEST: CPT

## 2022-12-19 PROCEDURE — 86900 BLOOD TYPING SEROLOGIC ABO: CPT

## 2022-12-19 PROCEDURE — 80053 COMPREHEN METABOLIC PANEL: CPT

## 2022-12-20 ENCOUNTER — HOSPITAL ENCOUNTER (OUTPATIENT)
Facility: MEDICAL CENTER | Age: 32
End: 2022-12-20
Attending: OBSTETRICS & GYNECOLOGY | Admitting: OBSTETRICS & GYNECOLOGY
Payer: MEDICAID

## 2022-12-20 ENCOUNTER — ANESTHESIA (OUTPATIENT)
Dept: SURGERY | Facility: MEDICAL CENTER | Age: 32
End: 2022-12-20
Payer: MEDICAID

## 2022-12-20 VITALS
RESPIRATION RATE: 14 BRPM | TEMPERATURE: 97.5 F | HEART RATE: 75 BPM | DIASTOLIC BLOOD PRESSURE: 93 MMHG | HEIGHT: 67 IN | SYSTOLIC BLOOD PRESSURE: 122 MMHG | OXYGEN SATURATION: 97 % | WEIGHT: 134.48 LBS | BODY MASS INDEX: 21.11 KG/M2

## 2022-12-20 DIAGNOSIS — G89.18 POSTOPERATIVE PAIN: ICD-10-CM

## 2022-12-20 LAB
ABO + RH BLD: NORMAL
PATHOLOGY CONSULT NOTE: NORMAL

## 2022-12-20 PROCEDURE — 160009 HCHG ANES TIME/MIN: Performed by: OBSTETRICS & GYNECOLOGY

## 2022-12-20 PROCEDURE — 36415 COLL VENOUS BLD VENIPUNCTURE: CPT

## 2022-12-20 PROCEDURE — 160002 HCHG RECOVERY MINUTES (STAT): Performed by: OBSTETRICS & GYNECOLOGY

## 2022-12-20 PROCEDURE — 160048 HCHG OR STATISTICAL LEVEL 1-5: Performed by: OBSTETRICS & GYNECOLOGY

## 2022-12-20 PROCEDURE — 700105 HCHG RX REV CODE 258: Performed by: OBSTETRICS & GYNECOLOGY

## 2022-12-20 PROCEDURE — 88305 TISSUE EXAM BY PATHOLOGIST: CPT

## 2022-12-20 PROCEDURE — 700111 HCHG RX REV CODE 636 W/ 250 OVERRIDE (IP): Performed by: ANESTHESIOLOGY

## 2022-12-20 PROCEDURE — 160041 HCHG SURGERY MINUTES - EA ADDL 1 MIN LEVEL 4: Performed by: OBSTETRICS & GYNECOLOGY

## 2022-12-20 PROCEDURE — 700102 HCHG RX REV CODE 250 W/ 637 OVERRIDE(OP): Performed by: ANESTHESIOLOGY

## 2022-12-20 PROCEDURE — 160035 HCHG PACU - 1ST 60 MINS PHASE I: Performed by: OBSTETRICS & GYNECOLOGY

## 2022-12-20 PROCEDURE — 160025 RECOVERY II MINUTES (STATS): Performed by: OBSTETRICS & GYNECOLOGY

## 2022-12-20 PROCEDURE — 00940 ANES VAGINAL PX NOS: CPT | Performed by: ANESTHESIOLOGY

## 2022-12-20 PROCEDURE — A9270 NON-COVERED ITEM OR SERVICE: HCPCS | Performed by: ANESTHESIOLOGY

## 2022-12-20 PROCEDURE — 700101 HCHG RX REV CODE 250: Performed by: OBSTETRICS & GYNECOLOGY

## 2022-12-20 PROCEDURE — 160046 HCHG PACU - 1ST 60 MINS PHASE II: Performed by: OBSTETRICS & GYNECOLOGY

## 2022-12-20 PROCEDURE — 700101 HCHG RX REV CODE 250: Performed by: ANESTHESIOLOGY

## 2022-12-20 PROCEDURE — 160029 HCHG SURGERY MINUTES - 1ST 30 MINS LEVEL 4: Performed by: OBSTETRICS & GYNECOLOGY

## 2022-12-20 RX ORDER — SIMETHICONE 125 MG
125 TABLET,CHEWABLE ORAL 3 TIMES DAILY PRN
Status: DISCONTINUED | OUTPATIENT
Start: 2022-12-20 | End: 2022-12-20 | Stop reason: HOSPADM

## 2022-12-20 RX ORDER — LIDOCAINE HYDROCHLORIDE 20 MG/ML
INJECTION, SOLUTION EPIDURAL; INFILTRATION; INTRACAUDAL; PERINEURAL PRN
Status: DISCONTINUED | OUTPATIENT
Start: 2022-12-20 | End: 2022-12-20 | Stop reason: SURG

## 2022-12-20 RX ORDER — HALOPERIDOL 5 MG/ML
1 INJECTION INTRAMUSCULAR
Status: DISCONTINUED | OUTPATIENT
Start: 2022-12-20 | End: 2022-12-20 | Stop reason: HOSPADM

## 2022-12-20 RX ORDER — SODIUM CHLORIDE, SODIUM LACTATE, POTASSIUM CHLORIDE, CALCIUM CHLORIDE 600; 310; 30; 20 MG/100ML; MG/100ML; MG/100ML; MG/100ML
INJECTION, SOLUTION INTRAVENOUS CONTINUOUS
Status: DISCONTINUED | OUTPATIENT
Start: 2022-12-20 | End: 2022-12-20 | Stop reason: HOSPADM

## 2022-12-20 RX ORDER — ONDANSETRON 2 MG/ML
INJECTION INTRAMUSCULAR; INTRAVENOUS PRN
Status: DISCONTINUED | OUTPATIENT
Start: 2022-12-20 | End: 2022-12-20 | Stop reason: HOSPADM

## 2022-12-20 RX ORDER — DEXAMETHASONE SODIUM PHOSPHATE 4 MG/ML
INJECTION, SOLUTION INTRA-ARTICULAR; INTRALESIONAL; INTRAMUSCULAR; INTRAVENOUS; SOFT TISSUE PRN
Status: DISCONTINUED | OUTPATIENT
Start: 2022-12-20 | End: 2022-12-20 | Stop reason: SURG

## 2022-12-20 RX ORDER — ACETAMINOPHEN 500 MG
1000 TABLET ORAL EVERY 8 HOURS PRN
Status: SHIPPED | OUTPATIENT
Start: 2022-12-20

## 2022-12-20 RX ORDER — MEPERIDINE HYDROCHLORIDE 25 MG/ML
6.25 INJECTION INTRAMUSCULAR; INTRAVENOUS; SUBCUTANEOUS
Status: DISCONTINUED | OUTPATIENT
Start: 2022-12-20 | End: 2022-12-20

## 2022-12-20 RX ORDER — ONDANSETRON 2 MG/ML
4 INJECTION INTRAMUSCULAR; INTRAVENOUS
Status: DISCONTINUED | OUTPATIENT
Start: 2022-12-20 | End: 2022-12-20 | Stop reason: HOSPADM

## 2022-12-20 RX ORDER — CEFAZOLIN SODIUM 1 G/3ML
INJECTION, POWDER, FOR SOLUTION INTRAMUSCULAR; INTRAVENOUS PRN
Status: DISCONTINUED | OUTPATIENT
Start: 2022-12-20 | End: 2022-12-20 | Stop reason: SURG

## 2022-12-20 RX ORDER — OXYCODONE HCL 5 MG/5 ML
10 SOLUTION, ORAL ORAL
Status: COMPLETED | OUTPATIENT
Start: 2022-12-20 | End: 2022-12-20

## 2022-12-20 RX ORDER — MISOPROSTOL 200 UG/1
TABLET ORAL
Status: DISCONTINUED
Start: 2022-12-20 | End: 2022-12-20 | Stop reason: HOSPADM

## 2022-12-20 RX ORDER — ACETAMINOPHEN 500 MG
1000 TABLET ORAL ONCE
Status: COMPLETED | OUTPATIENT
Start: 2022-12-20 | End: 2022-12-20

## 2022-12-20 RX ORDER — MIDAZOLAM HYDROCHLORIDE 1 MG/ML
INJECTION INTRAMUSCULAR; INTRAVENOUS PRN
Status: DISCONTINUED | OUTPATIENT
Start: 2022-12-20 | End: 2022-12-20 | Stop reason: SURG

## 2022-12-20 RX ORDER — CELECOXIB 200 MG/1
400 CAPSULE ORAL ONCE
Status: COMPLETED | OUTPATIENT
Start: 2022-12-20 | End: 2022-12-20

## 2022-12-20 RX ORDER — PROMETHAZINE HYDROCHLORIDE 25 MG/1
12.5 SUPPOSITORY RECTAL EVERY 4 HOURS PRN
Status: DISCONTINUED | OUTPATIENT
Start: 2022-12-20 | End: 2022-12-20 | Stop reason: HOSPADM

## 2022-12-20 RX ORDER — OXYCODONE HCL 5 MG/5 ML
5 SOLUTION, ORAL ORAL
Status: COMPLETED | OUTPATIENT
Start: 2022-12-20 | End: 2022-12-20

## 2022-12-20 RX ORDER — METHYLERGONOVINE MALEATE 0.2 MG/ML
INJECTION INTRAVENOUS
Status: DISCONTINUED
Start: 2022-12-20 | End: 2022-12-20 | Stop reason: HOSPADM

## 2022-12-20 RX ORDER — OXYTOCIN 10 [USP'U]/ML
INJECTION, SOLUTION INTRAMUSCULAR; INTRAVENOUS
Status: DISCONTINUED
Start: 2022-12-20 | End: 2022-12-20 | Stop reason: HOSPADM

## 2022-12-20 RX ORDER — DIPHENHYDRAMINE HYDROCHLORIDE 50 MG/ML
12.5 INJECTION INTRAMUSCULAR; INTRAVENOUS
Status: DISCONTINUED | OUTPATIENT
Start: 2022-12-20 | End: 2022-12-20 | Stop reason: HOSPADM

## 2022-12-20 RX ADMIN — MIDAZOLAM HYDROCHLORIDE 2 MG: 1 INJECTION, SOLUTION INTRAMUSCULAR; INTRAVENOUS at 13:57

## 2022-12-20 RX ADMIN — DEXAMETHASONE SODIUM PHOSPHATE 8 MG: 4 INJECTION, SOLUTION INTRA-ARTICULAR; INTRALESIONAL; INTRAMUSCULAR; INTRAVENOUS; SOFT TISSUE at 14:09

## 2022-12-20 RX ADMIN — CEFAZOLIN 2 G: 330 INJECTION, POWDER, FOR SOLUTION INTRAMUSCULAR; INTRAVENOUS at 13:57

## 2022-12-20 RX ADMIN — CELECOXIB 400 MG: 200 CAPSULE ORAL at 12:22

## 2022-12-20 RX ADMIN — OXYCODONE HYDROCHLORIDE 10 MG: 5 SOLUTION ORAL at 15:07

## 2022-12-20 RX ADMIN — PROPOFOL 200 MG: 10 INJECTION, EMULSION INTRAVENOUS at 13:57

## 2022-12-20 RX ADMIN — FENTANYL CITRATE 50 MCG: 50 INJECTION, SOLUTION INTRAMUSCULAR; INTRAVENOUS at 14:39

## 2022-12-20 RX ADMIN — LIDOCAINE HYDROCHLORIDE 100 MG: 20 INJECTION, SOLUTION EPIDURAL; INFILTRATION; INTRACAUDAL at 13:57

## 2022-12-20 RX ADMIN — ACETAMINOPHEN 1000 MG: 500 TABLET ORAL at 12:22

## 2022-12-20 RX ADMIN — SODIUM CHLORIDE, POTASSIUM CHLORIDE, SODIUM LACTATE AND CALCIUM CHLORIDE: 600; 310; 30; 20 INJECTION, SOLUTION INTRAVENOUS at 12:35

## 2022-12-20 RX ADMIN — FENTANYL CITRATE 50 MCG: 50 INJECTION INTRAMUSCULAR; INTRAVENOUS at 15:07

## 2022-12-20 RX ADMIN — FENTANYL CITRATE 25 MCG: 50 INJECTION, SOLUTION INTRAMUSCULAR; INTRAVENOUS at 14:11

## 2022-12-20 RX ADMIN — FENTANYL CITRATE 25 MCG: 50 INJECTION, SOLUTION INTRAMUSCULAR; INTRAVENOUS at 13:57

## 2022-12-20 RX ADMIN — ONDANSETRON 4 MG: 2 INJECTION INTRAMUSCULAR; INTRAVENOUS at 14:10

## 2022-12-20 ASSESSMENT — FIBROSIS 4 INDEX: FIB4 SCORE: 0.33

## 2022-12-20 ASSESSMENT — PAIN SCALES - GENERAL: PAIN_LEVEL: 4

## 2022-12-20 NOTE — ANESTHESIA PROCEDURE NOTES
Airway    Date/Time: 12/20/2022 1:54 PM  Performed by: Archana Kelly M.D.  Authorized by: Archana Kelly M.D.     Location:  OR  Urgency:  Elective  Difficult Airway: No    Indications for Airway Management:  Anesthesia      Spontaneous Ventilation: present    Sedation Level:  Deep  Preoxygenated: Yes    Patient Position:  Sniffing  MILS Maintained Throughout: No    Mask Difficulty Assessment:  0 - not attempted  Final Airway Type:  Supraglottic airway  Final Supraglottic Airway:  Standard LMA    SGA Size:  4  Number of Attempts at Approach:  1

## 2022-12-20 NOTE — OR SURGEON
Immediate Post OP Note    PreOp Diagnosis: retained poc following a  8 weeks ago, hx of DVT      PostOp Diagnosis: same      Procedure(s):  SUCTION DILATION AND CURETTAGE - Wound Class: Clean Contaminated    Surgeon(s):  Laura Saucedo M.D.    Anesthesiologist/Type of Anesthesia:  Anesthesiologist: Archana Kelly M.D./General    Surgical Staff:  Circulator: Josefina Mcgill R.N.  Scrub Person: Zonia Gonzalez C.N.A.    Specimens removed if any:  ID Type Source Tests Collected by Time Destination   A : products of conception Tissue Products of Conception PATHOLOGY SPECIMEN Laura Saucedo M.D. 2022  2:30 PM        Estimated Blood Loss: minimal    Findings: small piece of white tissue obtained/suspect retained placenta piece    Complications: none    Disposition: to RR stable/extubated        2022 3:04 PM Laura Saucedo M.D.

## 2022-12-20 NOTE — ANESTHESIA POSTPROCEDURE EVALUATION
Patient: Nichol Chaney    Procedure Summary     Date: 12/20/22 Room / Location: [facility] ROOM 24 / SURGERY SAME DAY [facility]    Anesthesia Start: 1353 Anesthesia Stop: 1448    Procedure: SUCTION DILATION AND CURETTAGE (Vagina ) Diagnosis: (RETAINED PRODUCTS OF CONCEPTION)    Surgeons: Laura Saucedo M.D. Responsible Provider: Archana Kelly M.D.    Anesthesia Type: general ASA Status: 2          Final Anesthesia Type: general  Last vitals  BP   Blood Pressure: (!) 122/93    Temp   36.4 °C (97.5 °F)    Pulse   75   Resp   14    SpO2   97 %      Anesthesia Post Evaluation    Patient location during evaluation: PACU  Patient participation: complete - patient participated  Level of consciousness: awake and alert  Pain score: 4    Airway patency: patent  Anesthetic complications: no  Cardiovascular status: adequate and hemodynamically stable  Respiratory status: acceptable  Hydration status: acceptable    PONV: none          There were no known notable events for this encounter.     Nurse Pain Score: 4 (NPRS)

## 2022-12-20 NOTE — DISCHARGE INSTRUCTIONS
What to Expect Post Anesthesia    Rest and take it easy for the first 24 hours.  A responsible adult is recommended to remain with you during that time.  It is normal to feel sleepy.  We encourage you to not do anything that requires balance, judgment or coordination.    FOR 24 HOURS DO NOT:  Drive, operate machinery or run household appliances.  Drink beer or alcoholic beverages.  Make important decisions or sign legal documents.    To avoid nausea, slowly advance diet as tolerated, avoiding spicy or greasy foods for the first day.  Add more substantial food to your diet according to your provider's instructions.  Babies can be fed formula or breast milk as soon as they are hungry.  INCREASE FLUIDS AND FIBER TO AVOID CONSTIPATION.    MILD FLU-LIKE SYMPTOMS ARE NORMAL.  YOU MAY EXPERIENCE GENERALIZED MUSCLE ACHES, THROAT IRRITATION, HEADACHE AND/OR SOME NAUSEA.    If any questions arise, call your provider DR Saucedo [phone].  If your provider is not available, please feel free to call the Surgical Center at [phone].    MEDICATIONS: Resume taking daily medication.  Take prescribed pain medication with food.  If no medication is prescribed, you may take non-aspirin pain medication if needed.  PAIN MEDICATION CAN BE VERY CONSTIPATING.  Take a stool softener or laxative such as senokot, pericolace, or milk of magnesia if needed.    Last pain medication given at Tylenol and Celebrex given at 12:30. Ok to take either Tylenol or Ibuprofen at 6:30 om if needed. It is also ok to stagger tylenol and ibuprofen every three hours to help with pain control.  Oxycodone given at 3:00 pm

## 2022-12-20 NOTE — H&P
HISTORY OF PRESENT ILLNESS:  The patient is a 32-year-old female who is now a   , had a spontaneous vaginal delivery on 10/14/2022.  Her pregnancy was   complicated by maternal history of a deep venous thrombosis and she was   anticoagulated with unfractionated heparin or Lovenox throughout the course of   the pregnancy as well as up to 6 weeks postpartum.  She arrived at her   postpartum visit and states that she was still having vaginal bleeding that   had never stopped.  She wanted to have an intrauterine device placed for   contraception; therefore, she was set up for a few weeks later.  Her bleeding   never stopped and prior to putting in an IUD, I did a transvaginal ultrasound   and there does appear to be some tiny bits of remnants of possible retained   products of conception up in the fundus.  Attempt to see if they would come   out with a manual suction evacuator in the office but it did not.  She is   bleeding and desires for definitive management and wants to proceed with   surgical management.  She has been given other options including the option of   misoprostol or just expectant management and time.  She desires to proceed   with suction dilation and curettage.  At the same time, she desired to have   her ParaGard IUD placed at that time; however, it cannot be placed at [facility]   as [facility] does not carry the device and I cannot bring it from the office to   place it during surgery.  She is still having vaginal bleeding daily but not   passing any significant clots or bleeding heavily.     PAST MEDICAL HISTORY:  Includes a personal history of a deep venous thrombosis   in her leg in 2017.     PAST SURGICAL HISTORY:  None.     FAMILY HISTORY:  Includes breast cancer.     SOCIAL HISTORY:  She is .  Her 's name is Meliton.  No alcohol,   tobacco or history of drug use.  She.  She is a prior alcoholic but has not   had any alcohol use throughout the course of the pregnancy or  postpartum.     GYNECOLOGIC HISTORY:  Her last menstrual period 11/15/2022.  She has had a   normal Pap smear and HPV high-risk negative also in .     OBSTETRIC HISTORY:  G2, now P2.  She has had 2 spontaneous vaginal deliveries.     ALLERGIES:  No known drug allergies.     PHYSICAL EXAMINATION:  VITAL SIGNS:  Blood pressure 116/68, weight 138 pounds.  GENERAL:  Awake, alert, oriented, no acute distress.  CARDIOVASCULAR:  Regular rate and rhythm.  CHEST:  Clear to auscultation bilaterally.  ABDOMEN:  Soft, nontender, nondistended, normal bowel sounds.  EXTREMITIES:  No cyanosis, clubbing or edema.  PELVIC:  Sterile vaginal exam, she is still having some bright red bleeding   vaginally.  Her os is closed.  Transvaginal ultrasound was performed, which   did reveal a bit of hypoechoic material in the fundus of the uterus, which   could represent still retained products of conception.  There was color flow   to one little area.  She declined offer for expectant management or Cytotec.     LABORATORY DATA:  Lab work, she is Rh positive.  Her CBC and CMP were normal.    HCG was negative.     ASSESSMENT AND PLAN:  1.  A 32-year-old  with suspected small amount of retained products of   conception in the fundus of the uterus, still causing some bleeding.  Prior to   inserting an IUD, I would like to get this resolved.  She desires to proceed   with surgery.  Risks, benefits and alternatives were thoroughly discussed with   her risks including bleeding, infection, pain, injury to bowel, bladder,   uterus, fallopian tubes, ovaries, major blood vessels or nerves.  She does   accept the use of blood products in the event of hemorrhage.  All questions   were answered prior to signing consent forms in the office.  2.  For birth control, she does desire a ParaGard IUD; however, I cannot place   it at [facility] or her surgery is being performed, she will need to return and   we will try to do it at her postop visit in the  office.  All questions were   answered.        ______________________________  MD SHAHRZAD HE/JUANITA    DD:  12/19/2022 18:32  DT:  12/19/2022 19:36    Job#:  [id number]

## 2022-12-20 NOTE — OR NURSING
1444 - pt arrived from OR. Report received. Pt asleep on 4L mask. Aby pad in place, no bleeding noted.     1456 pt awakening, tearful. Pt  brought to bedside. Pt denies pain at this time     1507 medicated for pain per MAR. Heat pack applied to abdomen for cramping pain. Pt tolerating PO fluids.     1520 meets phase 2 criteria. Dc instructions discussed. All questions answered.     1528 up to bathroom, able to void. Dressing with assistance. IV removed.     1537 pt dc to home with family. Escorted out by staff. All belongings sent with pt.

## 2022-12-20 NOTE — ANESTHESIA PREPROCEDURE EVALUATION
Case: [id number] Date/Time: 12/20/22 1245    Procedures:       SUCTION DILATION AND CURETTAGE INSERTION OF PARAGARD INTRAUTERINE DEVICE      INSERTION, IUD    Pre-op diagnosis: RETAINED PRODUCTS OF CONCEPTION, CONTRACEPTIVE MANAGEMENT    Location: [facility] ROOM 24 / SURGERY SAME DAY [facility]    Surgeons: Laura Saucedo M.D.          Relevant Problems   NEURO   (positive) Seizure (HCC)       Physical Exam    Airway   Mallampati: I  TM distance: >3 FB  Neck ROM: full       Cardiovascular   Rhythm: regular  Rate: normal     Dental - normal exam           Pulmonary   Breath sounds clear to auscultation     Abdominal - normal exam     Neurological              Anesthesia Plan    ASA 2       Plan - general       Airway plan will be LMA          Induction: intravenous    Postoperative Plan: Postoperative administration of opioids is intended.    Pertinent diagnostic labs and testing reviewed    Informed Consent:    Anesthetic plan and risks discussed with patient and spouse.    Use of blood products discussed with: whom consented to blood products.

## 2022-12-21 NOTE — OP REPORT
DATE OF SERVICE:  12/20/2022     PREOPERATIVE DIAGNOSES:  1.  Retained products of conception following spontaneous vaginal delivery 8   weeks ago.  2.  History of deep venous thrombosis, was anticoagulated for 6 weeks   postpartum.     POSTOPERATIVE DIAGNOSES:    1.  Retained products of conception following spontaneous vaginal delivery 8   weeks ago.  2.  History of deep venous thrombosis, was anticoagulated for 6 weeks   postpartum.     PROCEDURE:  Suction dilation and curettage.     PHYSICIAN/SURGEON:  Laura Saucedo MD     ANESTHESIOLOGIST:  Archana Kelly MD     ANESTHESIA:  General.     COMPLICATIONS:  None.     ESTIMATED BLOOD LOSS:  Minimal.     FINDINGS:  Small, approximately 1 cm piece of white tissue was obtained from   uterine curettings and will be sent for pathology.  Suspect products of   conception/retained placenta.  Size 8 mm curved curette was used.     DISPOSITION:  The patient was extubated and taken to recovery room stable.     DESCRIPTION OF PROCEDURE:  The patient was taken to the operating room Dr. Archana Kelly placed her under general anesthesia without any difficulty.    She was prepped and draped in a normal sterile fashion in dorsal lithotomy   position.  Timeout was called to identify correct patient, correct procedure   and confirmed.  She got preoperative dose of antibiotics.  A weighted speculum   was placed in the vagina and the anterior lip of the cervix was grasped with   a single tooth tenaculum.  She was serially dilated up to 8 mm, but she was   already partially dilated, so she was probably 6 cm and I dilated her to 8.    Size 8 mm curved suction curetting was then introduced and suction was turned   on.  There was a small 1 cm white piece of tissue, suspect it was retained   placenta piece, which was seen and removed.  Several passes were made,   followed by sharp curetting around the endometrial lining.  Bedside ultrasound   was performed transabdominally to  confirm that there was an empty uterus and   no other further retained products.  The single tooth tenaculum was removed   and silver nitrate was applied to the tenaculum sites.  The speculum was   removed.  Sponge, laps and needle counts were correct x3.  The patient was   taken out of lithotomy position, extubated and taken to recovery room stable.        ______________________________  MD SHAHRZAD HE/SHAY    DD:  12/20/2022 15:02  DT:  12/20/2022 17:22    Job#:  [id number]

## 2023-06-20 ENCOUNTER — HOSPITAL ENCOUNTER (EMERGENCY)
Facility: MEDICAL CENTER | Age: 33
End: 2023-06-20
Attending: EMERGENCY MEDICINE
Payer: MEDICAID

## 2023-06-20 VITALS
SYSTOLIC BLOOD PRESSURE: 147 MMHG | HEIGHT: 67 IN | RESPIRATION RATE: 16 BRPM | TEMPERATURE: 98 F | DIASTOLIC BLOOD PRESSURE: 97 MMHG | WEIGHT: 128.31 LBS | BODY MASS INDEX: 20.14 KG/M2 | HEART RATE: 75 BPM | OXYGEN SATURATION: 100 %

## 2023-06-20 DIAGNOSIS — K02.9 DENTAL CARIES: ICD-10-CM

## 2023-06-20 DIAGNOSIS — K08.89 PAIN, DENTAL: ICD-10-CM

## 2023-06-20 PROCEDURE — 36415 COLL VENOUS BLD VENIPUNCTURE: CPT

## 2023-06-20 PROCEDURE — 99281 EMR DPT VST MAYX REQ PHY/QHP: CPT

## 2023-06-20 RX ORDER — AMOXICILLIN AND CLAVULANATE POTASSIUM 875; 125 MG/1; MG/1
1 TABLET, FILM COATED ORAL 2 TIMES DAILY
Qty: 14 TABLET | Refills: 0 | Status: SHIPPED | OUTPATIENT
Start: 2023-06-20 | End: 2023-06-27

## 2023-06-20 RX ORDER — HYDROCODONE BITARTRATE AND ACETAMINOPHEN 5; 325 MG/1; MG/1
1 TABLET ORAL EVERY 6 HOURS PRN
Qty: 8 TABLET | Refills: 0 | Status: SHIPPED | OUTPATIENT
Start: 2023-06-20 | End: 2023-06-22

## 2023-06-20 ASSESSMENT — FIBROSIS 4 INDEX: FIB4 SCORE: 0.34

## 2023-06-20 NOTE — ED PROVIDER NOTES
ED Provider Note    CHIEF COMPLAINT  Chief Complaint   Patient presents with    Dental Pain     Lower left sided dental pain for year and half         MYRNA/JUDY      Nichol Chaney is a 33 y.o. female who presents to the emergency department complaining of dental pain.  The patient had a broken tooth in the left lower side of her jaw for the last several years.  Its only been painful last few days.  She obtained over-the-counter pain medicines without relief.  Had a fever yesterday but none today.  Denies any trismus or facial swelling or redness.  No other aggravating alleviating factors or associated complaints.  Has been trying to see a dentist without any success.    PAST MEDICAL HISTORY   has a past medical history of Blood clotting disorder (HCC) (2017), History of DVT (deep vein thrombosis), and Tobacco use.    SURGICAL HISTORY   has a past surgical history that includes dilation and evacuation (2022).    FAMILY HISTORY  Family History   Problem Relation Age of Onset    Cancer Mother         melanoma    No Known Problems Father     No Known Problems Sister     No Known Problems Brother     Stroke Maternal Grandmother     Cancer Maternal Grandmother         breast    Cancer Maternal Grandfather         squamous cell cancer of throat    Diabetes Neg Hx     Heart Disease Neg Hx     Hyperlipidemia Neg Hx     Hypertension Neg Hx     Blood Clots Neg Hx        SOCIAL HISTORY  Social History     Tobacco Use    Smoking status: Former     Packs/day: 0.25     Years: 10.00     Pack years: 2.50     Types: Cigarettes     Start date: 10/15/2018     Quit date: 2022     Years since quittin.8     Passive exposure: Past    Smokeless tobacco: Never   Vaping Use    Vaping Use: Some days    Substances: Nicotine, Salt nicotine   Substance and Sexual Activity    Alcohol use: Not Currently     Alcohol/week: 3.6 oz     Types: 6 Cans of beer per week     Comment: 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 (Registered Nurse) on 06/20/23 at 1254  Med List Status: Partial     Medication Last Dose Status   acetaminophen (TYLENOL) tablet 1,000 mg  Active   ibuprofen (MOTRIN) 800 MG Tab  Active   Prenatal MV-Min-Fe Fum-FA-DHA (PRENATAL 1 PO)  Active                    ALLERGIES  Allergies   Allergen Reactions    Other Food      Seafood; Welts, throat swelling       PHYSICAL EXAM  VITAL SIGNS: BP (!) 147/97   Pulse 75   Temp 36.7 °C (98 °F) (Temporal)   Resp 16   Ht 1.702 m (5' 7\")   Wt 58.2 kg (128 lb 4.9 oz)   LMP 05/22/2023   SpO2 100%   BMI 20.10 kg/m²    Constitutional: Well developed, Well nourished, No acute distress, Non-toxic appearance.   HENT: Normocephalic, Atraumatic, multiple dental caries.  Left posterior lower molar with large dental carry is eroded all the way into the gum.  Mild swelling.  No obvious abscess no facial cellulitis..   Eyes: PERRL, EOMI, Conjunctiva normal, No discharge.   Neck: Normal range of motion, No tenderness,  Cardiovascular: Normal heart rate, Normal rhythm, No murmurs,   Thorax & Lungs: Normal breath sounds, No respiratory distress,   Musculoskeletal: Good range of motion in all major joints.  Neurologic: Alert,No focal deficits noted.         DIAGNOSTIC STUDIES / PROCEDURES        RADIOLOGY  none    COURSE & MEDICAL DECISION MAKING    ED Observation Status? No; Patient does not meet criteria for ED Observation.     INITIAL ASSESSMENT, COURSE AND PLAN  Care Narrative:     Patient presents with dental pain secondary to dental caries.  Been refractory to over-the-counter pain medicines.  Put on antibiotics, p.o. and pain medicines for a couple of days.  She does have a history of alcohol use however she is in significant pain.  Discussed pros and cons of narcotics versus nonnarcotics and she would like a narcotic pain prescription because the pain is becoming " intolerable.  Patient is prescribed antibiotics.  She is referred to dental and given a dental referral sheet.    Prescribed Norco for discomfort counseled to return for pain, swelling or other concerns.  Questions answered, agreeable to plan.  Discharged in good condition.    In prescribing controlled substances to this patient, I certify that I have obtained and reviewed the medical history of Nichol Chaney. I have also made a good mariah effort to obtain applicable records from other providers who have treated the patient and records did not demonstrate any increased risk of substance abuse that would prevent me from prescribing controlled substances.     I have conducted a physical exam and documented it. I have reviewed Ms. Chaney’s prescription history as maintained by the [place] Prescription Monitoring Program.     I have assessed the patient’s risk for abuse, dependency, and addiction using the validated Opioid Risk Tool available at https://www.mdcalc.com/tjkoqm-qbij-jpur-ort-narcotic-abuse.     Given the above, I believe the benefits of controlled substance therapy outweigh the risks. The reasons for prescribing controlled substances include non-narcotic, oral analgesic alternatives have been inadequate for pain control. Accordingly, I have discussed the risk and benefits, treatment plan, and alternative therapies with the patient.                 HTN/IDDM FOLLOW UP:  The patient is referred to a primary physician for blood pressure management, diabetic screening, and for all other preventive health concerns        DISPOSITION AND DISCUSSIONS    Escalation of care considered, and ultimately not performed:blood analysis and diagnostic imaging      Decision tools and prescription drugs considered including, but not limited to: PDMP is reviewed    She will follow-up with a dentist..    FINAL DIAGNOSIS  1. Dental caries    2. Pain, dental           Electronically signed by: Rafael Hart M.D., 6/20/2023  1:14 PM

## 2023-06-20 NOTE — DISCHARGE INSTRUCTIONS
Antibiotics as prescribed.  Return for pain, swelling, redness, fever or other concerns.  No driving on Norco.  Do not take Tylenol with Norco.  Follow-up with a dentist.

## 2023-06-20 NOTE — ED TRIAGE NOTES
Pt ambulates to triage with infant in car seat  Chief Complaint   Patient presents with    Dental Pain     Lower left sided dental pain for year and half     Pt A & 0 x 4, speech clear, ambulates well    Pt updated on triage process and asked to inform RN of any changes while waiting in lobby.

## 2023-06-20 NOTE — ED NOTES
Pt given d/c paperwork, community resources and RX p/u information, pt verbalized understanding all information given. Pt ambulated out of the ER w/o difficulty

## 2024-08-15 ENCOUNTER — HOSPITAL ENCOUNTER (EMERGENCY)
Facility: MEDICAL CENTER | Age: 34
End: 2024-08-15
Attending: EMERGENCY MEDICINE
Payer: MEDICAID

## 2024-08-15 ENCOUNTER — APPOINTMENT (OUTPATIENT)
Dept: RADIOLOGY | Facility: MEDICAL CENTER | Age: 34
End: 2024-08-15
Attending: EMERGENCY MEDICINE
Payer: MEDICAID

## 2024-08-15 VITALS
BODY MASS INDEX: 19.9 KG/M2 | SYSTOLIC BLOOD PRESSURE: 109 MMHG | DIASTOLIC BLOOD PRESSURE: 74 MMHG | HEART RATE: 56 BPM | TEMPERATURE: 98 F | RESPIRATION RATE: 20 BRPM | OXYGEN SATURATION: 99 % | WEIGHT: 126.76 LBS | HEIGHT: 67 IN

## 2024-08-15 DIAGNOSIS — R07.9 CHEST PAIN, UNSPECIFIED TYPE: ICD-10-CM

## 2024-08-15 LAB
ALBUMIN SERPL BCP-MCNC: 4.5 G/DL (ref 3.2–4.9)
ALBUMIN/GLOB SERPL: 1.7 G/DL
ALP SERPL-CCNC: 43 U/L (ref 30–99)
ALT SERPL-CCNC: 13 U/L (ref 2–50)
ANION GAP SERPL CALC-SCNC: 12 MMOL/L (ref 7–16)
AST SERPL-CCNC: 14 U/L (ref 12–45)
BASOPHILS # BLD AUTO: 0.4 % (ref 0–1.8)
BASOPHILS # BLD: 0.02 K/UL (ref 0–0.12)
BILIRUB SERPL-MCNC: 0.4 MG/DL (ref 0.1–1.5)
BUN SERPL-MCNC: 8 MG/DL (ref 8–22)
CALCIUM ALBUM COR SERPL-MCNC: 8.5 MG/DL (ref 8.5–10.5)
CALCIUM SERPL-MCNC: 8.9 MG/DL (ref 8.4–10.2)
CHLORIDE SERPL-SCNC: 107 MMOL/L (ref 96–112)
CO2 SERPL-SCNC: 22 MMOL/L (ref 20–33)
CREAT SERPL-MCNC: 0.61 MG/DL (ref 0.5–1.4)
D DIMER PPP IA.FEU-MCNC: 0.41 UG/ML (FEU) (ref 0–0.5)
EKG IMPRESSION: NORMAL
EOSINOPHIL # BLD AUTO: 0.04 K/UL (ref 0–0.51)
EOSINOPHIL NFR BLD: 0.7 % (ref 0–6.9)
ERYTHROCYTE [DISTWIDTH] IN BLOOD BY AUTOMATED COUNT: 41.3 FL (ref 35.9–50)
GFR SERPLBLD CREATININE-BSD FMLA CKD-EPI: 121 ML/MIN/1.73 M 2
GLOBULIN SER CALC-MCNC: 2.7 G/DL (ref 1.9–3.5)
GLUCOSE SERPL-MCNC: 79 MG/DL (ref 65–99)
HCT VFR BLD AUTO: 35.5 % (ref 37–47)
HGB BLD-MCNC: 11.9 G/DL (ref 12–16)
IMM GRANULOCYTES # BLD AUTO: 0.01 K/UL (ref 0–0.11)
IMM GRANULOCYTES NFR BLD AUTO: 0.2 % (ref 0–0.9)
INR PPP: 1.05 (ref 0.87–1.13)
LIPASE SERPL-CCNC: 22 U/L (ref 11–82)
LYMPHOCYTES # BLD AUTO: 2.45 K/UL (ref 1–4.8)
LYMPHOCYTES NFR BLD: 45 % (ref 22–41)
MCH RBC QN AUTO: 30.9 PG (ref 27–33)
MCHC RBC AUTO-ENTMCNC: 33.5 G/DL (ref 32.2–35.5)
MCV RBC AUTO: 92.2 FL (ref 81.4–97.8)
MONOCYTES # BLD AUTO: 0.47 K/UL (ref 0–0.85)
MONOCYTES NFR BLD AUTO: 8.6 % (ref 0–13.4)
NEUTROPHILS # BLD AUTO: 2.45 K/UL (ref 1.82–7.42)
NEUTROPHILS NFR BLD: 45.1 % (ref 44–72)
NRBC # BLD AUTO: 0 K/UL
NRBC BLD-RTO: 0 /100 WBC (ref 0–0.2)
PLATELET # BLD AUTO: 239 K/UL (ref 164–446)
PMV BLD AUTO: 9.2 FL (ref 9–12.9)
POTASSIUM SERPL-SCNC: 3.6 MMOL/L (ref 3.6–5.5)
PROT SERPL-MCNC: 7.2 G/DL (ref 6–8.2)
PROTHROMBIN TIME: 14.2 SEC (ref 12–14.6)
RBC # BLD AUTO: 3.85 M/UL (ref 4.2–5.4)
SODIUM SERPL-SCNC: 141 MMOL/L (ref 135–145)
TROPONIN T SERPL-MCNC: <6 NG/L (ref 6–19)
TROPONIN T SERPL-MCNC: <6 NG/L (ref 6–19)
WBC # BLD AUTO: 5.4 K/UL (ref 4.8–10.8)

## 2024-08-15 PROCEDURE — 85025 COMPLETE CBC W/AUTO DIFF WBC: CPT

## 2024-08-15 PROCEDURE — 99284 EMERGENCY DEPT VISIT MOD MDM: CPT

## 2024-08-15 PROCEDURE — 83690 ASSAY OF LIPASE: CPT

## 2024-08-15 PROCEDURE — 85379 FIBRIN DEGRADATION QUANT: CPT

## 2024-08-15 PROCEDURE — 93005 ELECTROCARDIOGRAM TRACING: CPT

## 2024-08-15 PROCEDURE — 700111 HCHG RX REV CODE 636 W/ 250 OVERRIDE (IP): Mod: JZ

## 2024-08-15 PROCEDURE — 80053 COMPREHEN METABOLIC PANEL: CPT

## 2024-08-15 PROCEDURE — 93005 ELECTROCARDIOGRAM TRACING: CPT | Performed by: EMERGENCY MEDICINE

## 2024-08-15 PROCEDURE — 71045 X-RAY EXAM CHEST 1 VIEW: CPT

## 2024-08-15 PROCEDURE — 36415 COLL VENOUS BLD VENIPUNCTURE: CPT

## 2024-08-15 PROCEDURE — 84484 ASSAY OF TROPONIN QUANT: CPT

## 2024-08-15 PROCEDURE — 96372 THER/PROPH/DIAG INJ SC/IM: CPT

## 2024-08-15 PROCEDURE — 85610 PROTHROMBIN TIME: CPT

## 2024-08-15 RX ORDER — KETOROLAC TROMETHAMINE 15 MG/ML
15 INJECTION, SOLUTION INTRAMUSCULAR; INTRAVENOUS ONCE
Status: DISCONTINUED | OUTPATIENT
Start: 2024-08-15 | End: 2024-08-15

## 2024-08-15 RX ORDER — POLYVINYL ALCOHOL 14 MG/ML
1 SOLUTION/ DROPS OPHTHALMIC PRN
COMMUNITY

## 2024-08-15 RX ORDER — IBUPROFEN 200 MG
400 TABLET ORAL EVERY 6 HOURS PRN
COMMUNITY

## 2024-08-15 RX ORDER — KETOROLAC TROMETHAMINE 15 MG/ML
15 INJECTION, SOLUTION INTRAMUSCULAR; INTRAVENOUS ONCE
Status: COMPLETED | OUTPATIENT
Start: 2024-08-15 | End: 2024-08-15

## 2024-08-15 RX ORDER — ACETAMINOPHEN 500 MG
500-1000 TABLET ORAL EVERY 6 HOURS PRN
COMMUNITY

## 2024-08-15 RX ORDER — IBUPROFEN 600 MG/1
600 TABLET, FILM COATED ORAL EVERY 6 HOURS PRN
Qty: 20 TABLET | Refills: 0 | Status: SHIPPED | OUTPATIENT
Start: 2024-08-15 | End: 2024-08-18

## 2024-08-15 RX ADMIN — KETOROLAC TROMETHAMINE 15 MG: 15 INJECTION, SOLUTION INTRAMUSCULAR; INTRAVENOUS at 13:55

## 2024-08-15 ASSESSMENT — HEART SCORE
ECG: NORMAL
RISK FACTORS: NO KNOWN RISK FACTORS
AGE: <45
TROPONIN: LESS THAN OR EQUAL TO NORMAL LIMIT
HISTORY: SLIGHTLY SUSPICIOUS
HEART SCORE: 0

## 2024-08-15 ASSESSMENT — PAIN DESCRIPTION - PAIN TYPE
TYPE: ACUTE PAIN
TYPE: ACUTE PAIN

## 2024-08-15 ASSESSMENT — FIBROSIS 4 INDEX: FIB4 SCORE: 0.35

## 2024-08-15 NOTE — ED TRIAGE NOTES
"Chief Complaint   Patient presents with    Chest Pain     Midsernal x3d. Endorses hx of blood clots and is concerned for PE. Denies SOB, but notes that she feels that she has to put effort in to take a deep breath.     Headache     And states that her \"eyes feel like they have a hard time keeping up to  focus\".      Physical Exam  Pulmonary:      Effort: Pulmonary effort is normal.   Skin:     General: Skin is warm and dry.   Neurological:      Mental Status: She is alert.         "

## 2024-08-15 NOTE — ED NOTES
Pt does not want IV placed- discussed with pt reasoning for order d/t repeat labs and IV medication ordered, continues to not want IV placed at this time. Ordered toradol changed route from IV to IM per pt request. Repeat trop at 1400 will be drawn by straight stick per pt request.

## 2024-08-15 NOTE — ED NOTES
Pt Ao4, sitting up in [place], no acute distress noted. Pt medicated per orders. Repeat trop drawn by straight stick to [facility].

## 2024-08-15 NOTE — DISCHARGE INSTRUCTIONS
Your test today showed no findings of heart attack, blood clot or other dangerous condition.  However sometimes these can develop even with normal tests.  Please follow-up as above and seek more immediate medical attention for any new chest pain, difficulty breathing, passing out or other concerns.     No

## 2024-08-15 NOTE — Clinical Note
Nichol Chaney was seen and treated in our emergency department on 8/15/2024.  She may return to work on 08/16/2024.       If you have any questions or concerns, please don't hesitate to call.      Jesus Lang M.D.

## 2024-08-15 NOTE — ED NOTES
Pt AO4, amb w steady gait. C/o constant anterior chest pressure x3 days, worse when taking a deep breath. +intermittent SOB and light headedness. Denies any cough, fevers, GI symptoms, headaches. Hx DVT in [facility] in 2017, pt reports having some generalized swelling to bilateral hands/fingers. Otherwise no BLE swelling redness, edema or pain.   Pt placed on cardiac monitor: SR 60s, q1hr BP and continuous pulse ox.   Safety precautions in place including gurney locked in lowest position, both side rails up, call light within reach. Pt educated to use call light if requiring any assistance with getting oob.

## 2024-08-15 NOTE — ED NOTES
Pharmacy Medication Reconciliation      ~Medication reconciliation updated and complete per patient   ~Allergies have been verified and updated   ~No oral ABX within the last 30 days  ~[facility] :  [facility] [phone]      ~Anticoagulants (rivaroxaban, apixaban, edoxaban, dabigatran, warfarin, enoxaparin) taken in the last 14 days? No

## 2024-08-15 NOTE — ED PROVIDER NOTES
"ED Provider Note    CHIEF COMPLAINT  Chief Complaint   Patient presents with    Chest Pain     Midsernal x3d. Endorses hx of blood clots and is concerned for PE. Denies SOB, but notes that she feels that she has to put effort in to take a deep breath.     Headache     And states that her \"eyes feel like they have a hard time keeping up to  focus\".        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 female who presents with chest pain.  Patient reports central chest pain that began 3 days ago, she describes it is primarily a tightness in the center of her chest.  There is no radiation to her back or arms or neck or otherwise.  It is worse when she takes a deep breath.  Does not seem to be exertional or positional or have any other real alleviating or aggravating factors.  She reports no shortness of breath just a tightness when she does take a deep breath.  No recent travel.  No leg pain or swelling.  No OCPs.  Does not smoke cigarettes.  No fevers or chills or cough or congestion.  No abdominal pain or nausea vomiting.  No focal weakness or numbness.  She does report some headache, seem to be worse 3 days ago and has been gradually improving more of a pressure behind her eyes.    She does have prior history of DVT    PAST MEDICAL HISTORY   has a past medical history of Blood clotting disorder (HCC) (2017), History of DVT (deep vein thrombosis), and Tobacco use.    SURGICAL HISTORY   has a past surgical history that includes dilation and evacuation (12/20/2022).    FAMILY HISTORY  Family History   Problem Relation Age of Onset    Cancer Mother         melanoma    No Known Problems Father     No Known Problems Sister     No Known Problems Brother     Stroke Maternal " "Grandmother     Cancer Maternal Grandmother         breast    Cancer Maternal Grandfather         squamous cell cancer of throat    Diabetes Neg Hx     Heart Disease Neg Hx     Hyperlipidemia Neg Hx     Hypertension Neg Hx     Blood Clots Neg Hx        SOCIAL HISTORY  Social History     Tobacco Use    Smoking status: Former     Current packs/day: 0.00     Average packs/day: 0.3 packs/day for 10.0 years (2.5 ttl pk-yrs)     Types: Cigarettes     Start date: 10/15/2018     Quit date: 2022     Years since quittin.0     Passive exposure: Past    Smokeless tobacco: Never   Vaping Use    Vaping status: Some Days    Substances: Nicotine, Salt nicotine   Substance and Sexual Activity    Alcohol use: Not Currently     Alcohol/week: 3.6 oz     Types: 6 Cans of beer per week     Comment: sober 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 23    Drug use: No    Sexual activity: Yes     Partners: Male       CURRENT MEDICATIONS  Home Medications       Reviewed by Kiana Dubose (Pharmacy Tech) on 08/15/24 at 1433  Med List Status: Complete     Medication Last Dose Status   acetaminophen (TYLENOL) 500 MG Tab over a week ago Active   artificial tears 1.4 % Solution 2024 Active   ibuprofen (MOTRIN) 200 MG Tab 2024 Active                  Audit from Redirected Encounters    **Home medications have not yet been reviewed for this encounter**         ALLERGIES  Allergies   Allergen Reactions    Other Food      Seafood; Welts, throat swelling       PHYSICAL EXAM  VITAL SIGNS: BP (!) 135/92   Pulse 76   Temp 36.4 °C (97.5 °F) (Temporal)   Resp 16   Ht 1.702 m (5' 7\")   Wt 57.5 kg (126 lb 12.2 oz)   LMP 2024 (Approximate)   SpO2 99%   BMI 19.85 kg/m²      Pulse ox interpretation: I interpret this pulse ox as normal.  Constitutional: Alert in no apparent distress.  HENT: No signs of trauma, Bilateral external ears normal, Nose normal.   Eyes: Pupils are equal and reactive, Conjunctiva normal, Non-icteric.   Neck: Normal range of " motion, No tenderness, Supple, No stridor.   Cardiovascular: Regular rate and rhythm, no murmurs.  Strong radial pulse bilaterally  Thorax & Lungs: Normal breath sounds, No respiratory distress, No wheezing, No chest tenderness.   Abdomen: Bowel sounds normal, Soft, No tenderness, No masses, No pulsatile masses. No peritoneal signs.  Skin: Warm, Dry, No erythema, No rash.   Back: No bony tenderness, No CVA tenderness.   Extremities: Intact distal pulses, No edema, No tenderness, No cyanosis,  Negative Joan's sign.   Musculoskeletal: Good range of motion in all major joints. No tenderness to palpation or major deformities noted.   Neurologic: Alert , Normal motor function, Normal sensory function, No focal deficits noted.   Psychiatric: Affect normal, Judgment normal, Mood normal.               EKG/LABS  Results for orders placed or performed during the hospital encounter of 08/15/24   CBC WITH DIFFERENTIAL   Result Value Ref Range    WBC 5.4 4.8 - 10.8 K/uL    RBC 3.85 (L) 4.20 - 5.40 M/uL    Hemoglobin 11.9 (L) 12.0 - 16.0 g/dL    Hematocrit 35.5 (L) 37.0 - 47.0 %    MCV 92.2 81.4 - 97.8 fL    MCH 30.9 27.0 - 33.0 pg    MCHC 33.5 32.2 - 35.5 g/dL    RDW 41.3 35.9 - 50.0 fL    Platelet Count 239 164 - 446 K/uL    MPV 9.2 9.0 - 12.9 fL    Neutrophils-Polys 45.10 44.00 - 72.00 %    Lymphocytes 45.00 (H) 22.00 - 41.00 %    Monocytes 8.60 0.00 - 13.40 %    Eosinophils 0.70 0.00 - 6.90 %    Basophils 0.40 0.00 - 1.80 %    Immature Granulocytes 0.20 0.00 - 0.90 %    Nucleated RBC 0.00 0.00 - 0.20 /100 WBC    Neutrophils (Absolute) 2.45 1.82 - 7.42 K/uL    Lymphs (Absolute) 2.45 1.00 - 4.80 K/uL    Monos (Absolute) 0.47 0.00 - 0.85 K/uL    Eos (Absolute) 0.04 0.00 - 0.51 K/uL    Baso (Absolute) 0.02 0.00 - 0.12 K/uL    Immature Granulocytes (abs) 0.01 0.00 - 0.11 K/uL    NRBC (Absolute) 0.00 K/uL   CMP   Result Value Ref Range    Sodium 141 135 - 145 mmol/L    Potassium 3.6 3.6 - 5.5 mmol/L    Chloride 107 96 - 112 mmol/L     Co2 22 20 - 33 mmol/L    Anion Gap 12.0 7.0 - 16.0    Glucose 79 65 - 99 mg/dL    Bun 8 8 - 22 mg/dL    Creatinine 0.61 0.50 - 1.40 mg/dL    Calcium 8.9 8.4 - 10.2 mg/dL    Correct Calcium 8.5 8.5 - 10.5 mg/dL    AST(SGOT) 14 12 - 45 U/L    ALT(SGPT) 13 2 - 50 U/L    Alkaline Phosphatase 43 30 - 99 U/L    Total Bilirubin 0.4 0.1 - 1.5 mg/dL    Albumin 4.5 3.2 - 4.9 g/dL    Total Protein 7.2 6.0 - 8.2 g/dL    Globulin 2.7 1.9 - 3.5 g/dL    A-G Ratio 1.7 g/dL   PT/INR   Result Value Ref Range    PT 14.2 12.0 - 14.6 sec    INR 1.05 0.87 - 1.13   TROPONIN   Result Value Ref Range    Troponin T <6 6 - 19 ng/L   D-DIMER   Result Value Ref Range    D-Dimer 0.41 0.00 - 0.50 ug/mL (FEU)   LIPASE   Result Value Ref Range    Lipase 22 11 - 82 U/L   ESTIMATED GFR   Result Value Ref Range    GFR (CKD-EPI) 121 >60 mL/min/1.73 m 2   TROPONIN   Result Value Ref Range    Troponin T <6 6 - 19 ng/L   EKG   Result Value Ref Range    Report       [facility] Emergency Dept.    Test Date:  2024-08-15  Pt Name:    LADARIUS DAY                  Department: [facility]  MRN:        [id number]                      Room:  Gender:     Female                       Technician: [id number]  :        1990                   Requested By:ER TRIAGE PROTOCOL  Order #:    [id number]                    Reading MD: FILIPE RUBI MD    Measurements  Intervals                                Axis  Rate:       56                           P:          59  NE:         125                          QRS:        50  QRSD:       101                          T:          51  QT:         427  QTc:        413    Interpretive Statements  Sinus rhythm  Baseline wander in lead(s) II,III,aVF  Compared to ECG 2021 15:06:18  Intraventricular conduction delay no longer present  Electronically Signed On 08- 14:52:30 PDT by FILIPE RUBI MD         I have independently interpreted this EKG    RADIOLOGY/PROCEDURES   I have independently  interpreted the diagnostic imaging associated with this visit and am waiting the final reading from the radiologist.   My preliminary interpretation is as follows: no edema    Radiologist interpretation:  DX-CHEST-PORTABLE (1 VIEW)   Final Result      No acute cardiac or pulmonary abnormalities are identified.          COURSE & MEDICAL DECISION MAKING    ASSESSMENT, COURSE AND PLAN  Care Narrative: 1:10 PM  Patient is evaluated the bedside, chart is reviewed.  Differential diagnosis is considered as below I have ordered for Toradol for pain, diagnostic labs, ECG, x-ray    2:54 PM  Patient is reevaluated, updated on all results, she is comfortable and agreeable with discharge      CHEST PAIN:   HEART Score for Major Cardiac Events  HEART Score     History: Slightly suspicious  ECG: Normal  Age: <45  Risk Factors: No known risk factors  Troponin: Less than or equal to normal limit    Heart Score: 0    Total Score   0-3 Points = Low Score, risk of MACE 0.9-1.7%.  4-6 Points = Moderate Score, risk of MACE 12-16.6%  7-10 Points = High Score, risk of MACE 50-65%           PROBLEMS MANAGED    #Chest pain.  At this point no findings of emergent process, does seem more chest wall in nature, prescribed ibuprofen.  ACS is less likely given atypical nature of pain, ECG with no ischemic changes, negative troponin x2, HEART score of 0 and patient lack of major cardiac risk factors. Given these factors as well as patient age, risk of 30 day to 6 month mortality or ACS is low based on multiple studies. This was discussed with patient and need for follow up was emphasized. PE is less likely given negative D-dimer, no tachycardia or hypoxemia.  Dissection, aneurysm and pneumothorax are unlikely given  the nature of the pain. Pneumonia or other infection is less likely given no fever or infectious symptoms as well as no radiographic evidence. Other non emergent causes such as costochondritis, muscle strain,GI causes were also  considered      DISPOSITION AND DISCUSSIONS    Barriers to care at this time, including but not limited to: Patient does not have established PCP.     Decision tools and prescription drugs considered including, but not limited to:  HEART score 0 .    } The patient will return for new or worsening symptoms and is stable at the time of discharge.    The patient is referred to a primary physician for blood pressure management, diabetic screening, and for all other preventative health concerns.        DISPOSITION:  Patient will be discharged home in stable condition.    FOLLOW UP:  [address] [address]  [phone]    As needed      OUTPATIENT MEDICATIONS:  New Prescriptions    IBUPROFEN (MOTRIN) 600 MG TAB    Take 1 Tablet by mouth every 6 hours as needed for Mild Pain for up to 3 days.         FINAL DIAGNOSIS  1. Chest pain, unspecified type         Electronically signed by: Jesus Lang M.D., 8/15/2024 1:09 PM

## (undated) DEVICE — SET LEADWIRE 5 LEAD BEDSIDE DISPOSABLE ECG (1SET OF 5/EA)

## (undated) DEVICE — WATER IRRIGATION STERILE 1000ML (12EA/CA)

## (undated) DEVICE — TUBING D & E COLLECTION SET (50EA/PK)

## (undated) DEVICE — Device

## (undated) DEVICE — TUBE CONNECTING SUCTION - CLEAR PLASTIC STERILE 72 IN (50EA/CA)

## (undated) DEVICE — SENSOR OXIMETER ADULT SPO2 RD SET (20EA/BX)

## (undated) DEVICE — KIT D & C COLLECTION (10EA/PK)

## (undated) DEVICE — LACTATED RINGERS INJ 1000 ML - (14EA/CA 60CA/PF)

## (undated) DEVICE — SODIUM CHL IRRIGATION 0.9% 1000ML (12EA/CA)

## (undated) DEVICE — CANISTER SUCTION RIGID RED 1500CC (40EA/CA)

## (undated) DEVICE — MASK OXYGEN VNYL ADLT MED CONC WITH 7 FOOT TUBING  - (50EA/CA)

## (undated) DEVICE — TOWEL STOP TIMEOUT SAFETY FLAG (40EA/CA)

## (undated) DEVICE — GLOVE BIOGEL INDICATOR SZ 7SURGICAL PF LTX - (50/BX 4BX/CA)

## (undated) DEVICE — CANISTER SUCTION 3000ML MECHANICAL FILTER AUTO SHUTOFF MEDI-VAC NONSTERILE LF DISP  (40EA/CA)

## (undated) DEVICE — TUBING CLEARLINK DUO-VENT - C-FLO (48EA/CA)

## (undated) DEVICE — CANNULA W/ SUPPLY TUBING O2 - (50/CA)

## (undated) DEVICE — GOWN WARMING STANDARD FLEX - (30/CA)

## (undated) DEVICE — SUTURE GENERAL

## (undated) DEVICE — VACURETTE 8MM CURVED 10/PKG

## (undated) DEVICE — SUCTION INSTRUMENT YANKAUER BULBOUS TIP W/O VENT (50EA/CA)

## (undated) DEVICE — PAD SANITARY 11IN MAXI IND WRAPPED  (12EA/PK 24PK/CA)

## (undated) DEVICE — SLEEVE VASO CALF MED - (10PR/CA)

## (undated) DEVICE — GLOVE BIOGEL SZ 6.5 SURGICAL PF LTX (50PR/BX 4BX/CA)

## (undated) DEVICE — KIT  I.V. START (100EA/CA)